# Patient Record
Sex: MALE | Race: BLACK OR AFRICAN AMERICAN | NOT HISPANIC OR LATINO | Employment: STUDENT | URBAN - METROPOLITAN AREA
[De-identification: names, ages, dates, MRNs, and addresses within clinical notes are randomized per-mention and may not be internally consistent; named-entity substitution may affect disease eponyms.]

---

## 2022-05-13 ENCOUNTER — OFFICE VISIT (OUTPATIENT)
Dept: ORTHOPEDICS | Facility: CLINIC | Age: 22
End: 2022-05-13
Payer: COMMERCIAL

## 2022-05-13 ENCOUNTER — HOSPITAL ENCOUNTER (OUTPATIENT)
Dept: RADIOLOGY | Facility: HOSPITAL | Age: 22
Discharge: HOME | End: 2022-05-13
Attending: PHYSICIAN ASSISTANT
Payer: COMMERCIAL

## 2022-05-13 VITALS
DIASTOLIC BLOOD PRESSURE: 69 MMHG | HEART RATE: 72 BPM | BODY MASS INDEX: 22.02 KG/M2 | WEIGHT: 177.13 LBS | SYSTOLIC BLOOD PRESSURE: 123 MMHG | HEIGHT: 75 IN

## 2022-05-13 DIAGNOSIS — M25.512 CHRONIC LEFT SHOULDER PAIN: ICD-10-CM

## 2022-05-13 DIAGNOSIS — G89.29 CHRONIC LEFT SHOULDER PAIN: ICD-10-CM

## 2022-05-13 DIAGNOSIS — S43.432A SUPERIOR GLENOID LABRUM LESION OF LEFT SHOULDER, INITIAL ENCOUNTER: Primary | ICD-10-CM

## 2022-05-13 PROCEDURE — 3074F PR MOST RECENT SYSTOLIC BLOOD PRESSURE < 130 MM HG: ICD-10-PCS | Mod: CPTII,S$GLB,, | Performed by: PHYSICIAN ASSISTANT

## 2022-05-13 PROCEDURE — 73030 X-RAY EXAM OF SHOULDER: CPT | Mod: TC,LT

## 2022-05-13 PROCEDURE — 3008F PR BODY MASS INDEX (BMI) DOCUMENTED: ICD-10-PCS | Mod: CPTII,S$GLB,, | Performed by: PHYSICIAN ASSISTANT

## 2022-05-13 PROCEDURE — 3078F DIAST BP <80 MM HG: CPT | Mod: CPTII,S$GLB,, | Performed by: PHYSICIAN ASSISTANT

## 2022-05-13 PROCEDURE — 3074F SYST BP LT 130 MM HG: CPT | Mod: CPTII,S$GLB,, | Performed by: PHYSICIAN ASSISTANT

## 2022-05-13 PROCEDURE — 73030 X-RAY EXAM OF SHOULDER: CPT | Mod: 26,LT,, | Performed by: RADIOLOGY

## 2022-05-13 PROCEDURE — 99499 NO LOS: ICD-10-PCS | Mod: S$GLB,,, | Performed by: PHYSICIAN ASSISTANT

## 2022-05-13 PROCEDURE — 3008F BODY MASS INDEX DOCD: CPT | Mod: CPTII,S$GLB,, | Performed by: PHYSICIAN ASSISTANT

## 2022-05-13 PROCEDURE — 99999 PR PBB SHADOW E&M-NEW PATIENT-LVL III: ICD-10-PCS | Mod: PBBFAC,,, | Performed by: PHYSICIAN ASSISTANT

## 2022-05-13 PROCEDURE — 99999 PR PBB SHADOW E&M-NEW PATIENT-LVL III: CPT | Mod: PBBFAC,,, | Performed by: PHYSICIAN ASSISTANT

## 2022-05-13 PROCEDURE — 1159F MED LIST DOCD IN RCRD: CPT | Mod: CPTII,S$GLB,, | Performed by: PHYSICIAN ASSISTANT

## 2022-05-13 PROCEDURE — 1159F PR MEDICATION LIST DOCUMENTED IN MEDICAL RECORD: ICD-10-PCS | Mod: CPTII,S$GLB,, | Performed by: PHYSICIAN ASSISTANT

## 2022-05-13 PROCEDURE — 3078F PR MOST RECENT DIASTOLIC BLOOD PRESSURE < 80 MM HG: ICD-10-PCS | Mod: CPTII,S$GLB,, | Performed by: PHYSICIAN ASSISTANT

## 2022-05-13 PROCEDURE — 73030 XR SHOULDER COMPLETE 2 OR MORE VIEWS LEFT: ICD-10-PCS | Mod: 26,LT,, | Performed by: RADIOLOGY

## 2022-05-13 PROCEDURE — 1160F RVW MEDS BY RX/DR IN RCRD: CPT | Mod: CPTII,S$GLB,, | Performed by: PHYSICIAN ASSISTANT

## 2022-05-13 PROCEDURE — 99499 UNLISTED E&M SERVICE: CPT | Mod: S$GLB,,, | Performed by: PHYSICIAN ASSISTANT

## 2022-05-13 PROCEDURE — 1160F PR REVIEW ALL MEDS BY PRESCRIBER/CLIN PHARMACIST DOCUMENTED: ICD-10-PCS | Mod: CPTII,S$GLB,, | Performed by: PHYSICIAN ASSISTANT

## 2022-05-13 RX ORDER — TRAMADOL HYDROCHLORIDE 50 MG/1
50 TABLET ORAL EVERY 6 HOURS PRN
Qty: 12 TABLET | Refills: 0 | Status: SHIPPED | OUTPATIENT
Start: 2022-05-13 | End: 2022-11-18

## 2022-05-13 NOTE — PROGRESS NOTES
"  SUBJECTIVE:     Chief Complaint & History of Present Illness:  Marco Mcmullen is a  New  patient 21 y.o. male who is seen here today with a complaint of  left shoulder pain .  Patient is here today for care treatment of sudden onset pain soreness in his left shoulder following a fall while playing basketball approximately 3 days ago.  Was seen at urgent care and sent for x-rays and MRI.  He has marked decrease in range of motion and significant pain with any activities greater than 20-30 degrees of abduction  On a scale of 1-10, with 10 being worst pain imaginable, he rates this pain as 6 on good days and 9 on bad days.  he describes the pain as tender and sore.    Review of patient's allergies indicates:  No Known Allergies      No current outpatient medications on file.     No current facility-administered medications for this visit.       No past medical history on file.    No past surgical history on file.    Vital Signs (Most Recent)  Vitals:    05/13/22 1457   BP: 123/69   Pulse: 72       Review of Systems:  ROS:  Constitutional: no fever or chills  Eyes: no visual changes  ENT: no nasal congestion or sore throat  Respiratory: no cough or shortness of breath  Cardiovascular: no chest pain or palpitations  Gastrointestinal: no nausea or vomiting, tolerating diet  Genitourinary: no hematuria or dysuria  Integument/Breast: no rash or pruritis  Hematologic/Lymphatic: no easy bruising or lymphadenopathy  Musculoskeletal: no arthralgias or myalgias  Neurological: no seizures or tremors  Behavioral/Psych: no auditory or visual hallucinations  Endocrine: no heat or cold intolerance      OBJECTIVE:     PHYSICAL EXAM:  Height: 6' 3" (190.5 cm) Weight: 80.3 kg (177 lb 2.2 oz), General Appearance: Well nourished, well developed, in no acute distress.  Neurological: Mood & affect are normal.  Shoulder exam: left  Tenderness: globally  ROM: not tested  Shoulder Strength: not tested  }     "                 RADIOGRAPHS:  X-rays of the shoulder taken today films reviewed by me demonstrate no evidence of fracture or dislocation joint spaces well maintained no advanced degenerative joint disease.    Patient brings MRI report from outside facility demonstrates moderate to severe slap tear of the left shoulder    ASSESSMENT/PLAN:       ICD-10-CM ICD-9-CM   1. Chronic left shoulder pain  M25.512 719.41    G89.29 338.29       Plan:  Patient will keep consult to Sports Medicine for Monday morning with MRI  Prescription for tramadol 50 mg Q 6 hours for pain control continue NSAIDs ice and rest immobilization  This will be a no-charge visit

## 2022-05-16 ENCOUNTER — OFFICE VISIT (OUTPATIENT)
Dept: SPORTS MEDICINE | Facility: CLINIC | Age: 22
End: 2022-05-16
Payer: COMMERCIAL

## 2022-05-16 DIAGNOSIS — S43.432A GLENOID LABRUM TEAR, LEFT, INITIAL ENCOUNTER: Primary | ICD-10-CM

## 2022-05-16 PROCEDURE — 1159F MED LIST DOCD IN RCRD: CPT | Mod: CPTII,S$GLB,, | Performed by: ORTHOPAEDIC SURGERY

## 2022-05-16 PROCEDURE — 99999 PR PBB SHADOW E&M-EST. PATIENT-LVL II: CPT | Mod: PBBFAC,,, | Performed by: ORTHOPAEDIC SURGERY

## 2022-05-16 PROCEDURE — 99213 PR OFFICE/OUTPT VISIT, EST, LEVL III, 20-29 MIN: ICD-10-PCS | Mod: S$GLB,,, | Performed by: ORTHOPAEDIC SURGERY

## 2022-05-16 PROCEDURE — 1159F PR MEDICATION LIST DOCUMENTED IN MEDICAL RECORD: ICD-10-PCS | Mod: CPTII,S$GLB,, | Performed by: ORTHOPAEDIC SURGERY

## 2022-05-16 PROCEDURE — 99213 OFFICE O/P EST LOW 20 MIN: CPT | Mod: S$GLB,,, | Performed by: ORTHOPAEDIC SURGERY

## 2022-05-16 PROCEDURE — 99999 PR PBB SHADOW E&M-EST. PATIENT-LVL II: ICD-10-PCS | Mod: PBBFAC,,, | Performed by: ORTHOPAEDIC SURGERY

## 2022-05-16 NOTE — PROGRESS NOTES
NEW PATIENT    HISTORY OF PRESENT ILLNESS   21 y.o. Male with a history of left shulder pain who is a student at [employer]. He states he fell while playing basketball. This was about a week ago. He states he is feeling popping in the shoulder while he is pulling and while putting his shirt on. He has pain while lifting overhead.  He does not recall how he fell. He is referred today by Fercho Abreu PA-C.  He was sent for an outside MRI which he brings with him today.  He states he still in pain and has been wearing a sling.    Is affecting ADLs.  Pain is /10 at it's worst.        PAST MEDICAL HISTORY    No past medical history on file.    PAST SURGICAL HISTORY     No past surgical history on file.    FAMILY HISTORY    No family history on file.    SOCIAL HISTORY    Social History     Socioeconomic History    Marital status: Single       MEDICATIONS      Current Outpatient Medications:     traMADoL (ULTRAM) 50 mg tablet, Take 1 tablet (50 mg total) by mouth every 6 (six) hours as needed for Pain., Disp: 12 tablet, Rfl: 0    ALLERGIES     Review of patient's allergies indicates:  No Known Allergies      REVIEW OF SYSTEMS   Constitution: Negative. Negative for chills, fever and night sweats.   HENT: Negative for congestion and headaches.    Eyes: Negative for blurred vision, left vision loss and right vision loss.   Cardiovascular: Negative for chest pain and syncope.   Respiratory: Negative for cough and shortness of breath.    Endocrine: Negative for polydipsia, polyphagia and polyuria.   Hematologic/Lymphatic: Negative for bleeding problem. Does not bruise/bleed easily.   Skin: Negative for dry skin, itching and rash.   Musculoskeletal: Negative for falls. Positive for left shoulder pain  Gastrointestinal: Negative for abdominal pain and bowel incontinence.   Genitourinary: Negative for bladder incontinence and nocturia.   Neurological: Negative for disturbances in coordination, loss of balance and seizures.    Psychiatric/Behavioral: Negative for depression. The patient does not have insomnia.    Allergic/Immunologic: Negative for hives and persistent infections.     PHYSICAL EXAMINATION    Vitals: There were no vitals taken for this visit.    General: The patient appears active and healthy with no apparent physical problems.  No disturbance of mood or affect is demonstrated. Alert and Oriented.    HEENT: Eyes normal, pupils equally round, nose normal.    Resp: Equal and symmetrical chest rises. No wheezing    CV: Regular rate    Neck: Supple; nonpainful range of motion.    Extremities: no cyanosis, clubbing, edema, or diffuse swelling.  Palpable pulses, good capillary refill of the digits.  No coolness, discoloration, edema or obvious varicosities.    Skin: no lesions noted.    Lymphatic: no detected adenopathy in the upper or lower extremities.    Neurologic: normal mental status, normal reflexes, normal gait and balance.  Patient is alert and oriented to person, place and time.  No flaccidity or spasticity is noted.  No motor or sensory deficits are noted.  Light touch is intact    Orthopaedic:     SHOULDER EXAM - LEFT    Inspection:   Normal skin color and appearance with no scars.  No muscle atrophy noted.  No scapular winging.      Palpation: No tenderness of the acromioclavicular joint, lateral edge of the acromion, biceps tendon, trapezius muscle or scapulothoracic bursa.      ROM:      PROM:     FE - 180°    Abd/ER -  90°  Abd/IR -  45°   Add/ER -  60°     AROM:    FE - 180°    Abd/ER -  90°  Abd/IR -  45°   Add/ER -  60°         Tests:     - Ferguson, - Neer's, - Cross Arm Adduction, + Sherman, - Yerguson, - Speed. - Belly Press,  - Jobes, - Lift Off    Stability: - sulcus, - apprehension, - relocation, - load and shift, + DLS      Motor:  Rotator cuff strength is 5/5 supraspinatus, 5/5 infraspinatus, 5/5 subscapularis. Biceps, triceps and deltoid strength is 5/5.      Neuro     Distally there are no  paresthesias, and sensation is intact to light touch in the median, ulnar, and radial distributions.  Reflexes are 2/2 biceps, triceps and brachioradialis.        IMAGING    X-Ray Shoulder 2 or More Views Left  Narrative: EXAMINATION:  XR SHOULDER COMPLETE 2 OR MORE VIEWS LEFT    CLINICAL HISTORY:  lt. shoulder pain;Pain in left shoulder    TECHNIQUE:  Internal rotation, 2 scapula Y-views, and axillary view acquired.    COMPARISON:  None    FINDINGS:  No fracture.  No malalignment.  Preserved glenohumeral and acromioclavicular articulations.  No findings calcific tendonitis.  Impression: No acute or significant bony findings.    Electronically signed by: Bishop Rangel  Date:    05/13/2022  Time:    15:54    I reviewed an outside MRI of his left shoulder which demonstrates a posterior superior labral tear.  No other significant findings.    IMPRESSION       ICD-10-CM ICD-9-CM   1. Glenoid labrum tear, left, initial encounter  S43.432A 840.8       MEDICATIONS PRESCRIBED      1. None    RECOMMENDATIONS     1. Referral to physical therapy placed today  2. RTC in 1 month  3. I advised him that we will try and treat this nonsurgical at this point.  We will re-evaluate in 1 month.  If his symptoms do persist, we did discuss a left shoulder arthroscopic SLAP repair as well as posterior labral repair      All questions were answered, pt will contact us for questions or concerns in the interim.

## 2022-05-18 ENCOUNTER — CLINICAL SUPPORT (OUTPATIENT)
Dept: REHABILITATION | Facility: HOSPITAL | Age: 22
End: 2022-05-18
Attending: ORTHOPAEDIC SURGERY
Payer: COMMERCIAL

## 2022-05-18 DIAGNOSIS — M25.512 ACUTE PAIN OF LEFT SHOULDER DUE TO TRAUMA: ICD-10-CM

## 2022-05-18 DIAGNOSIS — G25.89 SCAPULAR DYSKINESIS: ICD-10-CM

## 2022-05-18 DIAGNOSIS — S43.432A GLENOID LABRUM TEAR, LEFT, INITIAL ENCOUNTER: ICD-10-CM

## 2022-05-18 DIAGNOSIS — G89.11 ACUTE PAIN OF LEFT SHOULDER DUE TO TRAUMA: ICD-10-CM

## 2022-05-18 DIAGNOSIS — M25.612 DECREASED RANGE OF MOTION OF LEFT SHOULDER: ICD-10-CM

## 2022-05-18 PROCEDURE — 97161 PT EVAL LOW COMPLEX 20 MIN: CPT

## 2022-05-18 PROCEDURE — 97110 THERAPEUTIC EXERCISES: CPT

## 2022-05-19 PROBLEM — G89.11 ACUTE PAIN OF LEFT SHOULDER DUE TO TRAUMA: Status: ACTIVE | Noted: 2022-05-19

## 2022-05-19 PROBLEM — G25.89 SCAPULAR DYSKINESIS: Status: ACTIVE | Noted: 2022-05-19

## 2022-05-19 PROBLEM — M25.612 DECREASED RANGE OF MOTION OF LEFT SHOULDER: Status: ACTIVE | Noted: 2022-05-19

## 2022-05-19 PROBLEM — M25.512 ACUTE PAIN OF LEFT SHOULDER DUE TO TRAUMA: Status: ACTIVE | Noted: 2022-05-19

## 2022-05-19 PROBLEM — S43.432A GLENOID LABRUM TEAR, LEFT, INITIAL ENCOUNTER: Status: ACTIVE | Noted: 2022-05-19

## 2022-05-19 NOTE — PLAN OF CARE
[facility]  Physical Therapy Initial Evaluation    [id number]    Therapy Diagnosis:   Encounter Diagnoses   Name Primary?    Glenoid labrum tear, left, initial encounter     Decreased range of motion of left shoulder     Scapular dyskinesis     Acute pain of left shoulder due to trauma      Physician: Jaycee Mcguire MD    Physician Orders: PT Eval and Treat  Medical Diagnosis from Referral: S43.432A (ICD-10-CM) - Glenoid labrum tear, left, initial encounter  Evaluation Date: 5/18/2022  Authorization Period Expiration: 5/16/2023  Plan of Care Expiration: 12/31/2022  Visit # / Visits authorized: 1/ 1    Time In: 1515  Time Out: 1610  Total Billable Time: 50 minutes    Precautions: Standard    Subjective     Date of onset: 5/4/2022  History of current condition - Marco reports: He was playing basketball when he fell onto his L shoulder. He does not recall exactly how he fell or if he FOOSH or not. He had immediate pain which increased lateral that evening. He started noting particular difficulty with putting his shirt on and performing overhead tasks. He reports occasional popping/clicking which is sometimes painful. He denies and N&T. He was seen by an orthopedic physician and then referred to sports med before being referred here. Radiographs were unremarkable. MRI brought from another facility shows a low grade SLAP tear from 12 to 6. Pt feels like he is slowly improving and is mostly trying to keep his arm below shoulder height and closer to his body.      Imaging   XR SHOULDER COMPLETE 2 OR MORE VIEWS LEFT     CLINICAL HISTORY:  lt. shoulder pain;Pain in left shoulder     TECHNIQUE:  Internal rotation, 2 scapula Y-views, and axillary view acquired.     COMPARISON:  None     FINDINGS:  No fracture.  No malalignment.  Preserved glenohumeral and acromioclavicular articulations.  No findings calcific tendonitis.     Impression:     No acute or  significant bony findings.    Prior Therapy: None  Exercise Routine/Sport Participation: Plays pickup basketball recreationally.   Social History: Lives at home  Occupation: [employer] student  Prior Level of Function: Unrestricted  Current Level of Function: Pain and difficulty with most functional tasks involving UE.     Pain:  Current 1/10, worst 8/10, best 0/10   Location: left shoulder  Description: Aching, Dull, Grabbing, Tight and Sharp  Aggravating Factors: Flexing and taking his shirt on/off  Easing Factors: Avoiding painful movements    Pts goals: Alleviate pain and restore full function of his shoulder.       Medical History:   No past medical history on file.    Surgical History:   Marco Mcmullen  has no past surgical history on file.    Medications:   Marco has a current medication list which includes the following prescription(s): tramadol.    Allergies:   Review of patient's allergies indicates:  No Known Allergies     Objective     Posture: L Shoulder elevated, L scapula elevated and anteriorly tilted, bilateral inferior angle prominence, increased anterior translation and prominence at C7. Flattened thoracic kyphosis      Shoulder Elevation: Decreased L upward scapular rotation with aberrant setting phase. Decreased L compared to R.       Shoulder Active Range of Motion:   Shoulder Right Left   Flexion   180 90 P!   ER at 0   90 90   Behind the back Reach   T10 T12 P!   Scapula Upward Rotation 60 45      Shoulder Passive Range of Motion:   Shoulder Right Left   Flexion   180 180   ER at 0   90 90   ER at 90   110 95 P!   IR   75 75     Cervical Range of Motion: WNL. Increased Motion at C5-C6 and upper cervical spine    Seated Thoracolumbar Range of Motion:    % Observation Pain   Flexion 100 Unremarkable N   Extension 70 Noted restriction T6-T2 N   Right Rotation 100 WNL N   Left Rotation 80 Restricted N       Strength:   Right Left   Scaption 4+/5 4/5   Shoulder ER at side 5/5 5/5   Shoulder IR at  side  5/5 5/5   Deltoid 5/5 5/5   Scapula Upward rotation force couple- UT/LT/SA (determined due to inability to reach full ROM) 4+/5 3+/5         Special Tests:   Right Left   Ferguson- Jose - -   Neer's - -   Full Can - -   Posterior/Internal Impingement Sign - -   Supine Impingement  - -      Right Left   Load & Shift - +2   Sulcus Sign - +   Apprehension Test - +   Relocation Test  - +   Clunk Test - +      Right Left   Drop Arm Test - -   ER Lag Sign - -   Bear Hug - -   Belly Press  - -      Right Left   Active Compression - +   Cross Body Adduction - +   Scapular Assist - +       Joint Mobility: Increased joint mobility bilaterally at GH joint with increased L anterior humeral translation at 45 and 90 degrees abduction on L. P! With posterior glide on L when assessing GH ligs.     Palpation: Mild TTP about the posterior glenoid    Flexibility:   Post Cuff: R + ; L +   Pec Minor: R + ; L +    Beighton Index: 5/9      CMS Impairment/Limitation/Restriction for FOTO Shoulder Survey    Therapist reviewed FOTO scores for Marco GouldSavageAvel on 5/18/2022.   FOTO documents entered into [employer] - see Media section.    Limitation Score: See media section  Category: Other       Treatment     Treatment Time In: 1545  Treatment Time Out: 1610  Total Treatment time separate from Evaluation: 25 minutes    Marco received therapeutic exercises to develop strength, endurance, ROM, flexibility and posture for 20 minutes including:  S/L Thoracic Rotation x10 ea.   Quadruped SA Rock Back x10  IR/ER TB Step outs 3x8 ea.     Marco received the following manual therapy techniques:  were applied for 5 minutes, including:  Supine thoracic HVLA      Home Exercises and Patient Education Provided     Education provided:   - Principles of proximal stability for distal control.   - Prognosis, activity modification, goals for therapy, role of therapy for care, exercises/HEP    Written Home Exercises Provided: See patient  "instructions.  Exercises were reviewed and Mraco was able to demonstrate them prior to the end of the session.   Pt received a written copy of exercises to perform at home. Marco demonstrated good  understanding of the education provided.     See EMR under patient instructions for exercises given.     Assessment     Marco is a 21 y.o. male referred to outpatient Physical Therapy with a medical diagnosis of "glenoid labrum tear". He presents with L shoulder pain, decreased ROM, scapular dyskinesis, and decreased motor control with signs and symptoms today consistent with labral pathology.       Pt will benefit from skilled outpatient Physical Therapy to address the deficits stated above and in the chart below, provide pt/family education, and to maximize pt's level of independence. Pt prognosis is Excellent.     Plan of care discussed with patient: Yes  Pt's spiritual, cultural and educational needs considered and patient is agreeable to the plan of care and goals as stated below:       Anticipated Barriers for therapy: COVID19      Medical Necessity is demonstrated by the following  History  Co-morbidities and personal factors that may impact the plan of care Co-morbidities:   None    Personal Factors:   no deficits     low   Examination  Body Structures and Functions, activity limitations and participation restrictions that may impact the plan of care Body Regions:   neck  back  upper extremities  trunk    Body Systems:    gross symmetry  ROM  strength  gross coordinated movement  motor control  motor learning    Participation Restrictions:   None    Activity limitations:   Learning and applying knowledge  No deficit    General Tasks and Commands  No deficit    Communication  No deficit    Mobility  lifting and carrying objects  moving around using equipment (WC)    Self care  No deficit    Domestic Life  No deficit    Interactions/Relationships  No deficit    Life Areas  Recreational health/wellness related " acitivites.     Community and Social Life  No deficit          moderate   Clinical Presentation stable and uncomplicated low   Decision Making/ Complexity Score: low     Goals:  Short Term Goals: 4 weeks  1. Pt will be compliant with HEP 50% of prescribed amount.   2. The pt to demo improvement in FOTO score to meet or exceed MCID.   3.  Pt will report NPRS <2/10 with dressing.   4. Pt will demo full AROM compared to contralateral side    Long Term Goals: 24 weeks   1. Pt will be compliant with % of prescribed amount.   2. Pt FOTO score to meet or exceed predicted outcome.   3. Pt will demo full upward scapular rotation with AROM UE elevation   4. The pt will report full participation in ADLs and IADLs without restrictions related to L [place].     Plan   Plan of care Certification: 5/18/2022 to 12/31/2022.    Outpatient Physical Therapy 2 times weekly for 24 weeks to include the following interventions: Manual Therapy, Moist Heat/ Ice, Neuromuscular Re-ed, Patient Education, Self Care, Therapeutic Activities and Therapeutic Exercise.     Primitivo Greco, PT , DPT

## 2022-05-25 ENCOUNTER — CLINICAL SUPPORT (OUTPATIENT)
Dept: REHABILITATION | Facility: HOSPITAL | Age: 22
End: 2022-05-25
Payer: COMMERCIAL

## 2022-05-25 DIAGNOSIS — G25.89 SCAPULAR DYSKINESIS: ICD-10-CM

## 2022-05-25 DIAGNOSIS — M25.612 DECREASED RANGE OF MOTION OF LEFT SHOULDER: Primary | ICD-10-CM

## 2022-05-25 DIAGNOSIS — G89.11 ACUTE PAIN OF LEFT SHOULDER DUE TO TRAUMA: ICD-10-CM

## 2022-05-25 DIAGNOSIS — M25.512 ACUTE PAIN OF LEFT SHOULDER DUE TO TRAUMA: ICD-10-CM

## 2022-05-25 PROCEDURE — 97140 MANUAL THERAPY 1/> REGIONS: CPT

## 2022-05-25 PROCEDURE — 97110 THERAPEUTIC EXERCISES: CPT

## 2022-05-25 PROCEDURE — 97112 NEUROMUSCULAR REEDUCATION: CPT

## 2022-05-25 NOTE — PROGRESS NOTES
Physical Therapy Daily Treatment Note     [id number]    Therapy Diagnosis:   Encounter Diagnoses   Name Primary?    Decreased range of motion of left shoulder Yes    Scapular dyskinesis     Acute pain of left shoulder due to trauma      Physician: Jaycee Mcguire MD    Visit Date: 5/25/2022  Physician Orders: PT Eval and Treat  Medical Diagnosis from Referral: S43.432A (ICD-10-CM) - Glenoid labrum tear, left, initial encounter  Evaluation Date: 5/18/2022  Authorization Period Expiration: 5/16/2023  Plan of Care Expiration: 12/31/2022  Visit # / Visits authorized: 1/ 20 + eval    Time In: 1703  Time Out: 1805  Total Billable Time: 55 minutes    Precautions: Standard    Subjective     Pt reports: His shoulder is feeling much better. He notes he is now able to change shirts with decreased pain and difficulty.    He was compliant with home exercise program.      Pain: Not verbalized/10  Location: L shoulder     Objective     Daily Measurements:     Shoulder Active Range of Motion:   Shoulder Right Left   Flexion    180 140           Daily Treatment       Marco received therapeutic exercises to develop strength, endurance, ROM, flexibility and posture for 29 minutes including:  Rower Lvl 5 x500m  S/L Thoracic Rotation x10 ea.   Quadruped SA Rock Back 2x10  IR/ER TB Step outs 3x8 ea.     Marco received the following manual therapy techniques: were applied for 10 minutes, including:  Re-assessment  S/L Scapular PNF w/PT  Supine thoracic HVLA       Marco participated in neuromuscular re-education activities to improve: Coordination, Kinesthetic, Sense and Motor Control for 16 minutes. The following activities were included:  90/90 IR/ER Step out in sagittal plane PTB 3x8 ea.   SA Wall slides 2x10     Next visit:  SA Wall Ball ABC's at Flx, Scap, and Abd 3x ea to light burn        Home Exercises and Patient Education Provided     Education provided:   - Reviewed/updated  HEP    Written Home Exercises Provided: yes.  Exercises were reviewed and Marco was able to demonstrate them prior to the end of the session.  Marco demonstrated good  understanding of the education provided.     See EMR under patient instructions for exercises given.     Assessment     Marco presents today with improved AROM and decreased pain indicating positive response to stabilization interventions and periscapular re-education. AROM flexion further improved within session to 160 degrees. He needed occasional cues to avoid UT/elvator dominant activation with step outs.     Marco Is progressing well towards his goals.     Pt will continue to benefit from skilled outpatient physical therapy to address the deficits listed in the problem list box on initial evaluation, provide pt/family education and to maximize pt's level of independence in the home and community environment. Pt prognosis is Good.     Pt's spiritual, cultural and educational needs considered and pt agreeable to plan of care and goals.    Anticipated barriers to physical therapy: None    Goals:  Short Term Goals: 4 weeks  1. Pt will be compliant with HEP 50% of prescribed amount.   2. The pt to demo improvement in FOTO score to meet or exceed MCID.   3.  Pt will report NPRS <2/10 with dressing.   4. Pt will demo full AROM compared to contralateral side     Long Term Goals: 24 weeks   1. Pt will be compliant with % of prescribed amount.   2. Pt FOTO score to meet or exceed predicted outcome.   3. Pt will demo full upward scapular rotation with AROM UE elevation   4. The pt will report full participation in ADLs and IADLs without restrictions related to L [place].       Plan     Plan of care Certification: 5/18/2022 to 12/31/2022.     Outpatient Physical Therapy 2 times weekly for 24 weeks to include the following interventions: Manual Therapy, Moist Heat/ Ice, Neuromuscular Re-ed, Patient Education, Self Care, Therapeutic Activities and  Therapeutic Exercise.        Primitivo Greco, PT , DPT

## 2022-06-08 ENCOUNTER — CLINICAL SUPPORT (OUTPATIENT)
Dept: REHABILITATION | Facility: HOSPITAL | Age: 22
End: 2022-06-08
Payer: COMMERCIAL

## 2022-06-08 DIAGNOSIS — M25.512 ACUTE PAIN OF LEFT SHOULDER DUE TO TRAUMA: ICD-10-CM

## 2022-06-08 DIAGNOSIS — G25.89 SCAPULAR DYSKINESIS: ICD-10-CM

## 2022-06-08 DIAGNOSIS — M25.612 DECREASED RANGE OF MOTION OF LEFT SHOULDER: Primary | ICD-10-CM

## 2022-06-08 DIAGNOSIS — G89.11 ACUTE PAIN OF LEFT SHOULDER DUE TO TRAUMA: ICD-10-CM

## 2022-06-08 PROCEDURE — 97112 NEUROMUSCULAR REEDUCATION: CPT

## 2022-06-08 PROCEDURE — 97110 THERAPEUTIC EXERCISES: CPT

## 2022-06-08 PROCEDURE — 97140 MANUAL THERAPY 1/> REGIONS: CPT

## 2022-06-08 NOTE — PROGRESS NOTES
Physical Therapy Daily Treatment Note     [id number]    Therapy Diagnosis:   Encounter Diagnoses   Name Primary?    Decreased range of motion of left shoulder Yes    Scapular dyskinesis     Acute pain of left shoulder due to trauma      Physician: Jaycee Mcguire MD    Visit Date: 6/8/2022  Physician Orders: PT Eval and Treat  Medical Diagnosis from Referral: S43.432A (ICD-10-CM) - Glenoid labrum tear, left, initial encounter  Evaluation Date: 5/18/2022  Authorization Period Expiration: 5/16/2023  Plan of Care Expiration: 12/31/2022  Visit # / Visits authorized: 2 / 20 + eval  1st FOTO F/U: 65%    Time In: 1635 (Pt arrived late)  Time Out: 1729  Total Billable Time: 54 minutes    Precautions: Standard    Subjective     Pt reports: His shoulder is feeling great. He was able to play and dunk a basketball pain free. He did try some swimming and didn't have pain but felt like his shoulder was a little tight.     He was compliant with home exercise program.      Pain: Not verbalized/10  Location: L shoulder     Objective     Daily Measurements:     Shoulder Active Range of Motion:   Shoulder Right Left   Flexion    180 180 Mild P! At EROM           Daily Treatment       Marco received therapeutic exercises to develop strength, endurance, ROM, flexibility and posture for 15 minutes including:  Rower Lvl 10 x500m  Cable Row Eccentric periscapular control 13# 3x10       Marco received the following manual therapy techniques: were applied for 10 minutes, including:  Re-assessment  Supine thoracic HVLA       Marco participated in neuromuscular re-education activities to improve: Coordination, Kinesthetic, Sense and Motor Control for 29 minutes. The following activities were included:  SA Wall Ball Abc's in sagittal, scapular, and frontal plane 2x ea. to light burn  90/90 IR/ER Step out in frontal plane PTB 3x8 ea.   SA Wall slides w/LT lift 3x10       Upcoming visits.   Reverse  Bear Crawls    Home Exercises and Patient Education Provided     Education provided:   - Reviewed/updated HEP    Written Home Exercises Provided: yes.  Exercises were reviewed and Marco was able to demonstrate them prior to the end of the session.  Marco demonstrated good  understanding of the education provided.     See EMR under patient instructions for exercises given.     Assessment     Pt continues to present at onset of treatment with significantly improved AROM in all planes with decreased pain. Improved SA strength and motor control with assessment today. Pt demo's weakness and decreased motor control with full AROM noted following activation/re-education interventions. He continues to respond well to CKC loading to improve joint centraction and proprioceptive input. Pt FOTO score exceeds MCID indicating a clinically meaningful response.     Marco Is progressing well towards his goals.     Pt will continue to benefit from skilled outpatient physical therapy to address the deficits listed in the problem list box on initial evaluation, provide pt/family education and to maximize pt's level of independence in the home and community environment. Pt prognosis is Good.     Pt's spiritual, cultural and educational needs considered and pt agreeable to plan of care and goals.    Anticipated barriers to physical therapy: None    Goals:  Short Term Goals: 4 weeks  1. Pt will be compliant with HEP 50% of prescribed amount.   2. The pt to demo improvement in FOTO score to meet or exceed MCID.   3.  Pt will report NPRS <2/10 with dressing.   4. Pt will demo full AROM compared to contralateral side     Long Term Goals: 24 weeks   1. Pt will be compliant with % of prescribed amount.   2. Pt FOTO score to meet or exceed predicted outcome.   3. Pt will demo full upward scapular rotation with AROM UE elevation   4. The pt will report full participation in ADLs and IADLs without restrictions related to [place].        Plan     Plan of care Certification: 5/18/2022 to 12/31/2022.     Outpatient Physical Therapy 2 times weekly for 24 weeks to include the following interventions: Manual Therapy, Moist Heat/ Ice, Neuromuscular Re-ed, Patient Education, Self Care, Therapeutic Activities and Therapeutic Exercise.        Primitivo Greco, PT , DPT

## 2022-06-15 ENCOUNTER — CLINICAL SUPPORT (OUTPATIENT)
Dept: REHABILITATION | Facility: HOSPITAL | Age: 22
End: 2022-06-15
Payer: COMMERCIAL

## 2022-06-15 DIAGNOSIS — G25.89 SCAPULAR DYSKINESIS: ICD-10-CM

## 2022-06-15 DIAGNOSIS — M25.512 ACUTE PAIN OF LEFT SHOULDER DUE TO TRAUMA: ICD-10-CM

## 2022-06-15 DIAGNOSIS — G89.11 ACUTE PAIN OF LEFT SHOULDER DUE TO TRAUMA: ICD-10-CM

## 2022-06-15 DIAGNOSIS — M25.612 DECREASED RANGE OF MOTION OF LEFT SHOULDER: Primary | ICD-10-CM

## 2022-06-15 PROCEDURE — 97140 MANUAL THERAPY 1/> REGIONS: CPT

## 2022-06-15 PROCEDURE — 97112 NEUROMUSCULAR REEDUCATION: CPT

## 2022-06-15 NOTE — PROGRESS NOTES
Physical Therapy Daily Treatment Note     [facility] [id number]    Therapy Diagnosis:   Encounter Diagnoses   Name Primary?    Decreased range of motion of left shoulder Yes    Scapular dyskinesis     Acute pain of left shoulder due to trauma      Physician: Jaycee Mcguire MD    Visit Date: 6/15/2022  Physician Orders: PT Eval and Treat  Medical Diagnosis from Referral: S43.432A (ICD-10-CM) - Glenoid labrum tear, left, initial encounter  Evaluation Date: 5/18/2022  Authorization Period Expiration: 5/16/2023  Plan of Care Expiration: 12/31/2022  Visit # / Visits authorized: 3 / 20 + eval  1st FOTO F/U: 65%    Time In: 1605  Time Out: 1709  Total Billable Time: 56 minutes    Precautions: Standard    Subjective     Pt reports: His shoulder had been feeling great. He fell off a skateboard the other day onto his right shoulder. He had some mild shoulder pain and non-painful pops but does not recall a subluxation or dislocation.     He was compliant with home exercise program.      Pain: Not verbalized/10  Location: L shoulder     Objective     Daily Measurements:     Shoulder Active Range of Motion:   Shoulder Right Left   Flexion    180 180 Mild P! At EROM           Daily Treatment       Marco received therapeutic exercises to develop strength, endurance, ROM, flexibility and posture for 5 minutes including:  Seated thoracic extension over chair x10      Marco received the following manual therapy techniques: were applied for 10 minutes, including:  Re-assessment  Supine thoracic HVLA       Marco participated in neuromuscular re-education activities to improve: Coordination, Kinesthetic, Sense and Motor Control for 41 minutes. The following activities were included:  Quadruped SA Rock w/LT Lift off 2x10   SA Wall Ball Abc's in sagittal, scapular, and frontal plane above shoulder height 2x ea. to light burn  90/90 IR/ER Step out in frontal plane PTB 3x8 ea.   Body Blade series: 2x to  "light burn  - IR/ER at side  - Fwd/Back at side  - at 90 degrees flexion  - at 90 degrees abduction    Prone Lvl 1 LT Re-Ed 10x10"  Prone Lvl 1 MT Re-Ed 10x10"      Upcoming visits.   Reverse Bear Crawls    Home Exercises and Patient Education Provided     Education provided:   - Reviewed/updated HEP    Written Home Exercises Provided: yes.  Exercises were reviewed and Marco was able to demonstrate them prior to the end of the session.  Marco demonstrated good  understanding of the education provided.     See EMR under patient instructions for exercises given.     Assessment     Pt presents today with reports of slight anterior shoulder tightness with EROM UE elevation and full AROM. This resolved following scapular re-education and GH stabilization interventions. Progressed CKC stabilization and motor control above shoulder height today which was tolerated very well. Pt demo's significant difficulty with isolated LT and MT motor control/activation. Intro'd muscle activation/re-education which was appropriately challenging.     Marco Is progressing well towards his goals.     Pt will continue to benefit from skilled outpatient physical therapy to address the deficits listed in the problem list box on initial evaluation, provide pt/family education and to maximize pt's level of independence in the home and community environment. Pt prognosis is Good.     Pt's spiritual, cultural and educational needs considered and pt agreeable to plan of care and goals.    Anticipated barriers to physical therapy: None    Goals:  Short Term Goals: 4 weeks  1. Pt will be compliant with HEP 50% of prescribed amount.   2. The pt to demo improvement in FOTO score to meet or exceed MCID.   3.  Pt will report NPRS <2/10 with dressing.   4. Pt will demo full AROM compared to contralateral side     Long Term Goals: 24 weeks   1. Pt will be compliant with % of prescribed amount.   2. Pt FOTO score to meet or exceed predicted outcome. "   3. Pt will demo full upward scapular rotation with AROM UE elevation   4. The pt will report full participation in ADLs and IADLs without restrictions related to [place].       Plan     Plan of care Certification: 5/18/2022 to 12/31/2022.     Outpatient Physical Therapy 2 times weekly for 24 weeks to include the following interventions: Manual Therapy, Moist Heat/ Ice, Neuromuscular Re-ed, Patient Education, Self Care, Therapeutic Activities and Therapeutic Exercise.        Primitivo Greco, PT , DPT

## 2022-06-20 ENCOUNTER — OFFICE VISIT (OUTPATIENT)
Dept: SPORTS MEDICINE | Facility: CLINIC | Age: 22
End: 2022-06-20
Payer: COMMERCIAL

## 2022-06-20 VITALS
SYSTOLIC BLOOD PRESSURE: 117 MMHG | DIASTOLIC BLOOD PRESSURE: 64 MMHG | HEIGHT: 75 IN | WEIGHT: 173 LBS | BODY MASS INDEX: 21.51 KG/M2 | HEART RATE: 68 BPM

## 2022-06-20 DIAGNOSIS — S43.432A GLENOID LABRUM TEAR, LEFT, INITIAL ENCOUNTER: Primary | ICD-10-CM

## 2022-06-20 PROCEDURE — 3074F SYST BP LT 130 MM HG: CPT | Mod: CPTII,S$GLB,, | Performed by: ORTHOPAEDIC SURGERY

## 2022-06-20 PROCEDURE — 3008F BODY MASS INDEX DOCD: CPT | Mod: CPTII,S$GLB,, | Performed by: ORTHOPAEDIC SURGERY

## 2022-06-20 PROCEDURE — 99214 PR OFFICE/OUTPT VISIT, EST, LEVL IV, 30-39 MIN: ICD-10-PCS | Mod: S$GLB,,, | Performed by: ORTHOPAEDIC SURGERY

## 2022-06-20 PROCEDURE — 99999 PR PBB SHADOW E&M-EST. PATIENT-LVL III: ICD-10-PCS | Mod: PBBFAC,,, | Performed by: ORTHOPAEDIC SURGERY

## 2022-06-20 PROCEDURE — 99999 PR PBB SHADOW E&M-EST. PATIENT-LVL III: CPT | Mod: PBBFAC,,, | Performed by: ORTHOPAEDIC SURGERY

## 2022-06-20 PROCEDURE — 1159F PR MEDICATION LIST DOCUMENTED IN MEDICAL RECORD: ICD-10-PCS | Mod: CPTII,S$GLB,, | Performed by: ORTHOPAEDIC SURGERY

## 2022-06-20 PROCEDURE — 99214 OFFICE O/P EST MOD 30 MIN: CPT | Mod: S$GLB,,, | Performed by: ORTHOPAEDIC SURGERY

## 2022-06-20 PROCEDURE — 3078F PR MOST RECENT DIASTOLIC BLOOD PRESSURE < 80 MM HG: ICD-10-PCS | Mod: CPTII,S$GLB,, | Performed by: ORTHOPAEDIC SURGERY

## 2022-06-20 PROCEDURE — 1159F MED LIST DOCD IN RCRD: CPT | Mod: CPTII,S$GLB,, | Performed by: ORTHOPAEDIC SURGERY

## 2022-06-20 PROCEDURE — 3078F DIAST BP <80 MM HG: CPT | Mod: CPTII,S$GLB,, | Performed by: ORTHOPAEDIC SURGERY

## 2022-06-20 PROCEDURE — 3008F PR BODY MASS INDEX (BMI) DOCUMENTED: ICD-10-PCS | Mod: CPTII,S$GLB,, | Performed by: ORTHOPAEDIC SURGERY

## 2022-06-20 PROCEDURE — 3074F PR MOST RECENT SYSTOLIC BLOOD PRESSURE < 130 MM HG: ICD-10-PCS | Mod: CPTII,S$GLB,, | Performed by: ORTHOPAEDIC SURGERY

## 2022-06-20 NOTE — PROGRESS NOTES
"Established  PATIENT    History 6/20/222  Pt fell skateboarding with friends since last encounter. Pt feels continous  popping in left shoulder. Pt would like to schedule surgery with Md . pt. Thinks now is  a good time.    HISTORY OF PRESENT ILLNESS   21 y.o. Male with a history of left shulder pain who is a student at [employer]. He states he fell while playing basketball. This was about a week ago. He states he is feeling popping in the shoulder while he is pulling and while putting his shirt on. He has pain while lifting overhead.  He does not recall how he fell. He is referred today by Fercho Abreu PA-C.  He was sent for an outside MRI which he brings with him today.  He states he still in pain and has been wearing a sling.    Is affecting ADLs.  Pain is /10 at it's worst.          REVIEW OF SYSTEMS   Constitution: Negative. Negative for chills, fever and night sweats.   HENT: Negative for congestion and headaches.    Eyes: Negative for blurred vision, left vision loss and right vision loss.   Cardiovascular: Negative for chest pain and syncope.   Respiratory: Negative for cough and shortness of breath.    Endocrine: Negative for polydipsia, polyphagia and polyuria.   Hematologic/Lymphatic: Negative for bleeding problem. Does not bruise/bleed easily.   Skin: Negative for dry skin, itching and rash.   Musculoskeletal: Negative for falls. Positive for left shoulder pain  Gastrointestinal: Negative for abdominal pain and bowel incontinence.   Genitourinary: Negative for bladder incontinence and nocturia.   Neurological: Negative for disturbances in coordination, loss of balance and seizures.   Psychiatric/Behavioral: Negative for depression. The patient does not have insomnia.    Allergic/Immunologic: Negative for hives and persistent infections.     PHYSICAL EXAMINATION    Vitals: /64   Pulse 68   Ht 6' 3" (1.905 m)   Wt 78.5 kg (173 lb)   BMI 21.62 kg/m²     General: The patient appears active and healthy " with no apparent physical problems.  No disturbance of mood or affect is demonstrated. Alert and Oriented.    HEENT: Eyes normal, pupils equally round, nose normal.    Resp: Equal and symmetrical chest rises. No wheezing    CV: Regular rate    Neck: Supple; nonpainful range of motion.    Extremities: no cyanosis, clubbing, edema, or diffuse swelling.  Palpable pulses, good capillary refill of the digits.  No coolness, discoloration, edema or obvious varicosities.    Skin: no lesions noted.    Lymphatic: no detected adenopathy in the upper or lower extremities.    Neurologic: normal mental status, normal reflexes, normal gait and balance.  Patient is alert and oriented to person, place and time.  No flaccidity or spasticity is noted.  No motor or sensory deficits are noted.  Light touch is intact    Orthopaedic:     SHOULDER EXAM - LEFT    Inspection:   Normal skin color and appearance with no scars.  No muscle atrophy noted.  No scapular winging.      Palpation: No tenderness of the acromioclavicular joint, lateral edge of the acromion, biceps tendon, trapezius muscle or scapulothoracic bursa.      ROM:      PROM:     FE - 180°    Abd/ER -  90°  Abd/IR -  45°   Add/ER -  60°     AROM:    FE - 180°    Abd/ER -  90°  Abd/IR -  45°   Add/ER -  60°         Tests:     - Ferguson, - Neer's, - Cross Arm Adduction, + [place], - Yerguson, - Speed. - Belly Press,  - Jobes, - Lift Off    Stability: - sulcus, - apprehension, - relocation, - load and shift, + DLS      Motor:  Rotator cuff strength is 5/5 supraspinatus, 5/5 infraspinatus, 5/5 subscapularis. Biceps, triceps and deltoid strength is 5/5.      Neuro     Distally there are no paresthesias, and sensation is intact to light touch in the median, ulnar, and radial distributions.  Reflexes are 2/2 biceps, triceps and brachioradialis.        IMAGING    X-Ray Shoulder 2 or More Views Left  Narrative: EXAMINATION:  XR SHOULDER COMPLETE 2 OR MORE VIEWS LEFT    CLINICAL  HISTORY:  lt. shoulder pain;Pain in left shoulder    TECHNIQUE:  Internal rotation, 2 scapula Y-views, and axillary view acquired.    COMPARISON:  None    FINDINGS:  No fracture.  No malalignment.  Preserved glenohumeral and acromioclavicular articulations.  No findings calcific tendonitis.  Impression: No acute or significant bony findings.    Electronically signed by: Bishop Rangel  Date:    05/13/2022  Time:    15:54    I reviewed an outside MRI of his left shoulder which demonstrates a posterior superior labral tear.  No other significant findings.    IMPRESSION       ICD-10-CM ICD-9-CM   1. Glenoid labrum tear, left, initial encounter  S43.432A 840.8       MEDICATIONS PRESCRIBED      1. None    RECOMMENDATIONS     1. Continue PT  2.  We did discuss a left shoulder arthroscopic SLAP repair as well as posterior labral repair  3. Schedule surgery for SLAP    All questions were answered, pt will contact us for questions or concerns in the interim.

## 2022-06-21 DIAGNOSIS — S43.432A GLENOID LABRUM TEAR, LEFT, INITIAL ENCOUNTER: ICD-10-CM

## 2022-06-21 DIAGNOSIS — S43.432A TEAR OF LEFT GLENOID LABRUM, INITIAL ENCOUNTER: Primary | ICD-10-CM

## 2022-06-29 ENCOUNTER — TELEPHONE (OUTPATIENT)
Dept: SPORTS MEDICINE | Facility: CLINIC | Age: 22
End: 2022-06-29
Payer: COMMERCIAL

## 2022-06-29 NOTE — TELEPHONE ENCOUNTER
Spoke with patient, rescheduled procedure to 7/26/22.    Dara Lipscomb MS, OTC [facility]    ----- Message from Aby Garduno sent at 6/29/2022 10:42 AM CDT -----  Regarding: Appt access  Pt wanting to R/S procedure  [phone] (home)

## 2022-06-30 ENCOUNTER — PATIENT MESSAGE (OUTPATIENT)
Dept: SPORTS MEDICINE | Facility: CLINIC | Age: 22
End: 2022-06-30
Payer: COMMERCIAL

## 2022-07-11 ENCOUNTER — OFFICE VISIT (OUTPATIENT)
Dept: SPORTS MEDICINE | Facility: CLINIC | Age: 22
End: 2022-07-11
Payer: COMMERCIAL

## 2022-07-11 VITALS
DIASTOLIC BLOOD PRESSURE: 60 MMHG | BODY MASS INDEX: 21.51 KG/M2 | HEIGHT: 75 IN | HEART RATE: 69 BPM | WEIGHT: 173 LBS | SYSTOLIC BLOOD PRESSURE: 115 MMHG

## 2022-07-11 DIAGNOSIS — M25.312 INSTABILITY OF LEFT SHOULDER JOINT: ICD-10-CM

## 2022-07-11 DIAGNOSIS — S43.432A TEAR OF LEFT GLENOID LABRUM, INITIAL ENCOUNTER: Primary | ICD-10-CM

## 2022-07-11 PROCEDURE — 99999 PR PBB SHADOW E&M-EST. PATIENT-LVL III: CPT | Mod: PBBFAC,,, | Performed by: ORTHOPAEDIC SURGERY

## 2022-07-11 PROCEDURE — 99215 PR OFFICE/OUTPT VISIT, EST, LEVL V, 40-54 MIN: ICD-10-PCS | Mod: S$GLB,,, | Performed by: ORTHOPAEDIC SURGERY

## 2022-07-11 PROCEDURE — 99999 PR PBB SHADOW E&M-EST. PATIENT-LVL III: ICD-10-PCS | Mod: PBBFAC,,, | Performed by: ORTHOPAEDIC SURGERY

## 2022-07-11 PROCEDURE — 3074F SYST BP LT 130 MM HG: CPT | Mod: CPTII,S$GLB,, | Performed by: ORTHOPAEDIC SURGERY

## 2022-07-11 PROCEDURE — 3078F DIAST BP <80 MM HG: CPT | Mod: CPTII,S$GLB,, | Performed by: ORTHOPAEDIC SURGERY

## 2022-07-11 PROCEDURE — 99215 OFFICE O/P EST HI 40 MIN: CPT | Mod: S$GLB,,, | Performed by: ORTHOPAEDIC SURGERY

## 2022-07-11 PROCEDURE — 1159F PR MEDICATION LIST DOCUMENTED IN MEDICAL RECORD: ICD-10-PCS | Mod: CPTII,S$GLB,, | Performed by: ORTHOPAEDIC SURGERY

## 2022-07-11 PROCEDURE — 3008F BODY MASS INDEX DOCD: CPT | Mod: CPTII,S$GLB,, | Performed by: ORTHOPAEDIC SURGERY

## 2022-07-11 PROCEDURE — 1159F MED LIST DOCD IN RCRD: CPT | Mod: CPTII,S$GLB,, | Performed by: ORTHOPAEDIC SURGERY

## 2022-07-11 PROCEDURE — 3074F PR MOST RECENT SYSTOLIC BLOOD PRESSURE < 130 MM HG: ICD-10-PCS | Mod: CPTII,S$GLB,, | Performed by: ORTHOPAEDIC SURGERY

## 2022-07-11 PROCEDURE — 3078F PR MOST RECENT DIASTOLIC BLOOD PRESSURE < 80 MM HG: ICD-10-PCS | Mod: CPTII,S$GLB,, | Performed by: ORTHOPAEDIC SURGERY

## 2022-07-11 PROCEDURE — 3008F PR BODY MASS INDEX (BMI) DOCUMENTED: ICD-10-PCS | Mod: CPTII,S$GLB,, | Performed by: ORTHOPAEDIC SURGERY

## 2022-07-11 RX ORDER — HYDROCODONE BITARTRATE AND ACETAMINOPHEN 7.5; 325 MG/1; MG/1
1 TABLET ORAL EVERY 4 HOURS PRN
Qty: 20 TABLET | Refills: 0 | Status: SHIPPED | OUTPATIENT
Start: 2022-07-11 | End: 2022-11-18

## 2022-07-11 RX ORDER — MUPIROCIN 20 MG/G
OINTMENT TOPICAL
Status: CANCELLED | OUTPATIENT
Start: 2022-07-11

## 2022-07-11 NOTE — H&P (VIEW-ONLY)
ESTABLISHED PATIENT    History 7/11/2022:  Marco returns to clinic today to complete perioperative paperwork. He does not report any changes to his condition since last visit.  He wants to proceed with surgery at this time.    History 6/20/222  Pt fell skateboarding with friends since last encounter. Pt feels continous  popping in left shoulder. Pt would like to schedule surgery with Md . pt. Thinks now is  a good time.    History 5/16/2022:  21 y.o. Male with a history of left shulder pain who is a student at [employer]. He states he fell while playing basketball. This was about a week ago. He states he is feeling popping in the shoulder while he is pulling and while putting his shirt on. He has pain while lifting overhead.  He does not recall how he fell. He is referred today by eFrcho Abreu PA-C.  He was sent for an outside MRI which he brings with him today.  He states he still in pain and has been wearing a sling.    Is affecting ADLs.  Pain is /10 at it's worst.        PAST MEDICAL HISTORY    History reviewed. No pertinent past medical history.    PAST SURGICAL HISTORY     History reviewed. No pertinent surgical history.    FAMILY HISTORY    History reviewed. No pertinent family history.    SOCIAL HISTORY    Social History     Socioeconomic History    Marital status: Single       MEDICATIONS      Current Outpatient Medications:     traMADoL (ULTRAM) 50 mg tablet, Take 1 tablet (50 mg total) by mouth every 6 (six) hours as needed for Pain. (Patient not taking: No sig reported), Disp: 12 tablet, Rfl: 0    ALLERGIES     Review of patient's allergies indicates:  No Known Allergies      REVIEW OF SYSTEMS   Constitution: Negative. Negative for chills, fever and night sweats.   HENT: Negative for congestion and headaches.    Eyes: Negative for blurred vision, left vision loss and right vision loss.   Cardiovascular: Negative for chest pain and syncope.   Respiratory: Negative for cough and shortness of breath.   "  Endocrine: Negative for polydipsia, polyphagia and polyuria.   Hematologic/Lymphatic: Negative for bleeding problem. Does not bruise/bleed easily.   Skin: Negative for dry skin, itching and rash.   Musculoskeletal: Negative for falls. Positive for left shoulder pain  Gastrointestinal: Negative for abdominal pain and bowel incontinence.   Genitourinary: Negative for bladder incontinence and nocturia.   Neurological: Negative for disturbances in coordination, loss of balance and seizures.   Psychiatric/Behavioral: Negative for depression. The patient does not have insomnia.    Allergic/Immunologic: Negative for hives and persistent infections.     PHYSICAL EXAMINATION    Vitals: /60   Pulse 69   Ht 6' 3" (1.905 m)   Wt 78.5 kg (173 lb)   BMI 21.62 kg/m²     General: The patient appears active and healthy with no apparent physical problems.  No disturbance of mood or affect is demonstrated. Alert and Oriented.    HEENT: Eyes normal, pupils equally round, nose normal.    Resp: Equal and symmetrical chest rises. No wheezing    CV: Regular rate    Neck: Supple; nonpainful range of motion.    Extremities: no cyanosis, clubbing, edema, or diffuse swelling.  Palpable pulses, good capillary refill of the digits.  No coolness, discoloration, edema or obvious varicosities.    Skin: no lesions noted.    Lymphatic: no detected adenopathy in the upper or lower extremities.    Neurologic: normal mental status, normal reflexes, normal gait and balance.  Patient is alert and oriented to person, place and time.  No flaccidity or spasticity is noted.  No motor or sensory deficits are noted.  Light touch is intact    Orthopaedic:     SHOULDER EXAM - LEFT    Inspection:   Normal skin color and appearance with no scars.  No muscle atrophy noted.  No scapular winging.      Palpation: No tenderness of the acromioclavicular joint, lateral edge of the acromion, biceps tendon, trapezius muscle or scapulothoracic bursa.      ROM:    "   PROM:     FE - 180°    Abd/ER -  90°  Abd/IR -  45°   Add/ER -  60°     AROM:    FE - 180°    Abd/ER -  90°  Abd/IR -  45°   Add/ER -  60°         Tests:     - Ferguson, - Neer's, - Cross Arm Adduction, + [place], - Yerguson, - Speed. - Belly Press,  - Jobes, - Lift Off    Stability: - sulcus, - apprehension, - relocation, - load and shift, + DLS      Motor:  Rotator cuff strength is 5/5 supraspinatus, 5/5 infraspinatus, 5/5 subscapularis. Biceps, triceps and deltoid strength is 5/5.      Neuro     Distally there are no paresthesias, and sensation is intact to light touch in the median, ulnar, and radial distributions.  Reflexes are 2/2 biceps, triceps and brachioradialis.        IMAGING    X-Ray Shoulder 2 or More Views Left  Narrative: EXAMINATION:  XR SHOULDER COMPLETE 2 OR MORE VIEWS LEFT    CLINICAL HISTORY:  lt. shoulder pain;Pain in left shoulder    TECHNIQUE:  Internal rotation, 2 scapula Y-views, and axillary view acquired.    COMPARISON:  None    FINDINGS:  No fracture.  No malalignment.  Preserved glenohumeral and acromioclavicular articulations.  No findings calcific tendonitis.  Impression: No acute or significant bony findings.    Electronically signed by: Bishop Rangel  Date:    05/13/2022  Time:    15:54    I reviewed an outside MRI of his left shoulder which demonstrates a posterior superior labral tear.  No other significant findings.    IMPRESSION       ICD-10-CM ICD-9-CM   1. Tear of left glenoid labrum, initial encounter  S43.432A 840.8   2. Instability of left shoulder joint  M25.312 718.81       MEDICATIONS PRESCRIBED      1. None    RECOMMENDATIONS     1. The patient elected to proceed with operative intervention after all risks, benefits, and alternatives were discussed with the patient.  The risks of surgery include bleeding, scarring, injuries to nerves, arteries and veins, need for additional surgeries, blood clots, infections, and the risk of anesthesia.  I personally obtained informed  consent from the patient and documented full history and physical, which has been placed on the chart.  2. Left shoulder arthroscopy with capsulorrhaphy and superior labrum anterior to posterior lesion repair  3. Referral to physical therapy placed today      All questions were answered, pt will contact us for questions or concerns in the interim.

## 2022-07-11 NOTE — PROGRESS NOTES
ESTABLISHED PATIENT    History 7/11/2022:  Marco returns to clinic today to complete perioperative paperwork. He does not report any changes to his condition since last visit.  He wants to proceed with surgery at this time.    History 6/20/222  Pt fell skateboarding with friends since last encounter. Pt feels continous  popping in left shoulder. Pt would like to schedule surgery with Md . pt. Thinks now is  a good time.    History 5/16/2022:  21 y.o. Male with a history of left shulder pain who is a student at [employer]. He states he fell while playing basketball. This was about a week ago. He states he is feeling popping in the shoulder while he is pulling and while putting his shirt on. He has pain while lifting overhead.  He does not recall how he fell. He is referred today by Fercho Abreu PA-C.  He was sent for an outside MRI which he brings with him today.  He states he still in pain and has been wearing a sling.    Is affecting ADLs.  Pain is /10 at it's worst.        PAST MEDICAL HISTORY    History reviewed. No pertinent past medical history.    PAST SURGICAL HISTORY     History reviewed. No pertinent surgical history.    FAMILY HISTORY    History reviewed. No pertinent family history.    SOCIAL HISTORY    Social History     Socioeconomic History    Marital status: Single       MEDICATIONS      Current Outpatient Medications:     traMADoL (ULTRAM) 50 mg tablet, Take 1 tablet (50 mg total) by mouth every 6 (six) hours as needed for Pain. (Patient not taking: No sig reported), Disp: 12 tablet, Rfl: 0    ALLERGIES     Review of patient's allergies indicates:  No Known Allergies      REVIEW OF SYSTEMS   Constitution: Negative. Negative for chills, fever and night sweats.   HENT: Negative for congestion and headaches.    Eyes: Negative for blurred vision, left vision loss and right vision loss.   Cardiovascular: Negative for chest pain and syncope.   Respiratory: Negative for cough and shortness of breath.   "  Endocrine: Negative for polydipsia, polyphagia and polyuria.   Hematologic/Lymphatic: Negative for bleeding problem. Does not bruise/bleed easily.   Skin: Negative for dry skin, itching and rash.   Musculoskeletal: Negative for falls. Positive for left shoulder pain  Gastrointestinal: Negative for abdominal pain and bowel incontinence.   Genitourinary: Negative for bladder incontinence and nocturia.   Neurological: Negative for disturbances in coordination, loss of balance and seizures.   Psychiatric/Behavioral: Negative for depression. The patient does not have insomnia.    Allergic/Immunologic: Negative for hives and persistent infections.     PHYSICAL EXAMINATION    Vitals: /60   Pulse 69   Ht 6' 3" (1.905 m)   Wt 78.5 kg (173 lb)   BMI 21.62 kg/m²     General: The patient appears active and healthy with no apparent physical problems.  No disturbance of mood or affect is demonstrated. Alert and Oriented.    HEENT: Eyes normal, pupils equally round, nose normal.    Resp: Equal and symmetrical chest rises. No wheezing    CV: Regular rate    Neck: Supple; nonpainful range of motion.    Extremities: no cyanosis, clubbing, edema, or diffuse swelling.  Palpable pulses, good capillary refill of the digits.  No coolness, discoloration, edema or obvious varicosities.    Skin: no lesions noted.    Lymphatic: no detected adenopathy in the upper or lower extremities.    Neurologic: normal mental status, normal reflexes, normal gait and balance.  Patient is alert and oriented to person, place and time.  No flaccidity or spasticity is noted.  No motor or sensory deficits are noted.  Light touch is intact    Orthopaedic:     SHOULDER EXAM - LEFT    Inspection:   Normal skin color and appearance with no scars.  No muscle atrophy noted.  No scapular winging.      Palpation: No tenderness of the acromioclavicular joint, lateral edge of the acromion, biceps tendon, trapezius muscle or scapulothoracic bursa.      ROM:    "   PROM:     FE - 180°    Abd/ER -  90°  Abd/IR -  45°   Add/ER -  60°     AROM:    FE - 180°    Abd/ER -  90°  Abd/IR -  45°   Add/ER -  60°         Tests:     - Ferguson, - Neer's, - Cross Arm Adduction, + Rugby, - Yerguson, - Speed. - Belly Press,  - Jobes, - Lift Off    Stability: - sulcus, - apprehension, - relocation, - load and shift, + DLS      Motor:  Rotator cuff strength is 5/5 supraspinatus, 5/5 infraspinatus, 5/5 subscapularis. Biceps, triceps and deltoid strength is 5/5.      Neuro     Distally there are no paresthesias, and sensation is intact to light touch in the median, ulnar, and radial distributions.  Reflexes are 2/2 biceps, triceps and brachioradialis.        IMAGING    X-Ray Shoulder 2 or More Views Left  Narrative: EXAMINATION:  XR SHOULDER COMPLETE 2 OR MORE VIEWS LEFT    CLINICAL HISTORY:  lt. shoulder pain;Pain in left shoulder    TECHNIQUE:  Internal rotation, 2 scapula Y-views, and axillary view acquired.    COMPARISON:  None    FINDINGS:  No fracture.  No malalignment.  Preserved glenohumeral and acromioclavicular articulations.  No findings calcific tendonitis.  Impression: No acute or significant bony findings.    Electronically signed by: Bishop Rangel  Date:    05/13/2022  Time:    15:54    I reviewed an outside MRI of his left shoulder which demonstrates a posterior superior labral tear.  No other significant findings.    IMPRESSION       ICD-10-CM ICD-9-CM   1. Tear of left glenoid labrum, initial encounter  S43.432A 840.8   2. Instability of left shoulder joint  M25.312 718.81       MEDICATIONS PRESCRIBED      1. None    RECOMMENDATIONS     1. The patient elected to proceed with operative intervention after all risks, benefits, and alternatives were discussed with the patient.  The risks of surgery include bleeding, scarring, injuries to nerves, arteries and veins, need for additional surgeries, blood clots, infections, and the risk of anesthesia.  I personally obtained informed  consent from the patient and documented full history and physical, which has been placed on the chart.  2. Left shoulder arthroscopy with capsulorrhaphy and superior labrum anterior to posterior lesion repair  3. Referral to physical therapy placed today      All questions were answered, pt will contact us for questions or concerns in the interim.

## 2022-07-11 NOTE — H&P
ESTABLISHED PATIENT    History 7/11/2022:  Marco returns to clinic today to complete perioperative paperwork. He does not report any changes to his condition since last visit.  He wants to proceed with surgery at this time.    History 6/20/222  Pt fell skateboarding with friends since last encounter. Pt feels continous  popping in left shoulder. Pt would like to schedule surgery with Md . pt. Thinks now is  a good time.    History 5/16/2022:  21 y.o. Male with a history of left shulder pain who is a student at [employer]. He states he fell while playing basketball. This was about a week ago. He states he is feeling popping in the shoulder while he is pulling and while putting his shirt on. He has pain while lifting overhead.  He does not recall how he fell. He is referred today by Fercho Abreu PA-C.  He was sent for an outside MRI which he brings with him today.  He states he still in pain and has been wearing a sling.    Is affecting ADLs.  Pain is /10 at it's worst.        PAST MEDICAL HISTORY    History reviewed. No pertinent past medical history.    PAST SURGICAL HISTORY     History reviewed. No pertinent surgical history.    FAMILY HISTORY    History reviewed. No pertinent family history.    SOCIAL HISTORY    Social History     Socioeconomic History    Marital status: Single       MEDICATIONS      Current Outpatient Medications:     traMADoL (ULTRAM) 50 mg tablet, Take 1 tablet (50 mg total) by mouth every 6 (six) hours as needed for Pain. (Patient not taking: No sig reported), Disp: 12 tablet, Rfl: 0    ALLERGIES     Review of patient's allergies indicates:  No Known Allergies      REVIEW OF SYSTEMS   Constitution: Negative. Negative for chills, fever and night sweats.   HENT: Negative for congestion and headaches.    Eyes: Negative for blurred vision, left vision loss and right vision loss.   Cardiovascular: Negative for chest pain and syncope.   Respiratory: Negative for cough and shortness of breath.   "  Endocrine: Negative for polydipsia, polyphagia and polyuria.   Hematologic/Lymphatic: Negative for bleeding problem. Does not bruise/bleed easily.   Skin: Negative for dry skin, itching and rash.   Musculoskeletal: Negative for falls. Positive for left shoulder pain  Gastrointestinal: Negative for abdominal pain and bowel incontinence.   Genitourinary: Negative for bladder incontinence and nocturia.   Neurological: Negative for disturbances in coordination, loss of balance and seizures.   Psychiatric/Behavioral: Negative for depression. The patient does not have insomnia.    Allergic/Immunologic: Negative for hives and persistent infections.     PHYSICAL EXAMINATION    Vitals: /60   Pulse 69   Ht 6' 3" (1.905 m)   Wt 78.5 kg (173 lb)   BMI 21.62 kg/m²     General: The patient appears active and healthy with no apparent physical problems.  No disturbance of mood or affect is demonstrated. Alert and Oriented.    HEENT: Eyes normal, pupils equally round, nose normal.    Resp: Equal and symmetrical chest rises. No wheezing    CV: Regular rate    Neck: Supple; nonpainful range of motion.    Extremities: no cyanosis, clubbing, edema, or diffuse swelling.  Palpable pulses, good capillary refill of the digits.  No coolness, discoloration, edema or obvious varicosities.    Skin: no lesions noted.    Lymphatic: no detected adenopathy in the upper or lower extremities.    Neurologic: normal mental status, normal reflexes, normal gait and balance.  Patient is alert and oriented to person, place and time.  No flaccidity or spasticity is noted.  No motor or sensory deficits are noted.  Light touch is intact    Orthopaedic:     SHOULDER EXAM - LEFT    Inspection:   Normal skin color and appearance with no scars.  No muscle atrophy noted.  No scapular winging.      Palpation: No tenderness of the acromioclavicular joint, lateral edge of the acromion, biceps tendon, trapezius muscle or scapulothoracic bursa.      ROM:    "   PROM:     FE - 180°    Abd/ER -  90°  Abd/IR -  45°   Add/ER -  60°     AROM:    FE - 180°    Abd/ER -  90°  Abd/IR -  45°   Add/ER -  60°         Tests:     - Ferguson, - Neer's, - Cross Arm Adduction, + [place], - Yerguson, - Speed. - Belly Press,  - Jobes, - Lift Off    Stability: - sulcus, - apprehension, - relocation, - load and shift, + DLS      Motor:  Rotator cuff strength is 5/5 supraspinatus, 5/5 infraspinatus, 5/5 subscapularis. Biceps, triceps and deltoid strength is 5/5.      Neuro     Distally there are no paresthesias, and sensation is intact to light touch in the median, ulnar, and radial distributions.  Reflexes are 2/2 biceps, triceps and brachioradialis.        IMAGING    X-Ray Shoulder 2 or More Views Left  Narrative: EXAMINATION:  XR SHOULDER COMPLETE 2 OR MORE VIEWS LEFT    CLINICAL HISTORY:  lt. shoulder pain;Pain in left shoulder    TECHNIQUE:  Internal rotation, 2 scapula Y-views, and axillary view acquired.    COMPARISON:  None    FINDINGS:  No fracture.  No malalignment.  Preserved glenohumeral and acromioclavicular articulations.  No findings calcific tendonitis.  Impression: No acute or significant bony findings.    Electronically signed by: Bishop Rangel  Date:    05/13/2022  Time:    15:54    I reviewed an outside MRI of his left shoulder which demonstrates a posterior superior labral tear.  No other significant findings.    IMPRESSION       ICD-10-CM ICD-9-CM   1. Tear of left glenoid labrum, initial encounter  S43.432A 840.8   2. Instability of left shoulder joint  M25.312 718.81       MEDICATIONS PRESCRIBED      1. None    RECOMMENDATIONS     1. The patient elected to proceed with operative intervention after all risks, benefits, and alternatives were discussed with the patient.  The risks of surgery include bleeding, scarring, injuries to nerves, arteries and veins, need for additional surgeries, blood clots, infections, and the risk of anesthesia.  I personally obtained informed  consent from the patient and documented full history and physical, which has been placed on the chart.  2. Left shoulder arthroscopy with capsulorrhaphy and superior labrum anterior to posterior lesion repair  3. Referral to physical therapy placed today      All questions were answered, pt will contact us for questions or concerns in the interim.

## 2022-07-20 ENCOUNTER — PATIENT MESSAGE (OUTPATIENT)
Dept: PREADMISSION TESTING | Facility: HOSPITAL | Age: 22
End: 2022-07-20
Payer: COMMERCIAL

## 2022-07-20 NOTE — ANESTHESIA PAT ROS NOTE
"                                                                                                             07/20/2022  Marco Mcmullen is a 21 y.o., male.  All information is gathered per Chart review via Epic system only.    Pre-op Assessment          Review of Systems  Social:  Non-Smoker, No Alcohol Use 12/2021 [facility] clinic note:  "Alcohol use: No    Drug use: Yes   Types: LSD, Marijuana   Comment: acid, occassionally "       Pulmonary:   Asthma Exercise induced, no Oral maintaince meds but has albuterol inhaler prn      Planned Procedure: Procedure(s) (LRB):  ARTHROSCOPY,SHOULDER,WITH CAPSULORRHAPHY - POLAR CARE (Left)  REPAIR, SLAP LESION, SHOULDER (Left)  Requested Anesthesia Type:General  Surgeon: Jaycee Mcguire MD  Service: Orthopedics  Known or anticipated Date of Surgery:7/26/2022    Previous anesthesia records:None on file    Last PCP note: 6-12 months ago , outside [facility]  note in Epic    6'3  173#  vaccinated      "

## 2022-07-25 ENCOUNTER — ANESTHESIA EVENT (OUTPATIENT)
Dept: SURGERY | Facility: HOSPITAL | Age: 22
End: 2022-07-25
Payer: COMMERCIAL

## 2022-07-25 ENCOUNTER — TELEPHONE (OUTPATIENT)
Dept: SPORTS MEDICINE | Facility: CLINIC | Age: 22
End: 2022-07-25
Payer: COMMERCIAL

## 2022-07-25 NOTE — TELEPHONE ENCOUNTER
----- Message from Tremontana Chevalier sent at 7/25/2022  1:03 PM CDT -----  Regarding: appt  Contact: pt @ [phone] or .  Pt calling to verify time of arrival for proc/surg tomorrow 07/26 with Dr. Mcguire. Pls call pt @ [phone] or .

## 2022-07-25 NOTE — TELEPHONE ENCOUNTER
Spoke to patients mother, informed her arrival time for Surgery tomorrow is 5am. Patients mother verbalized understanding.

## 2022-07-26 ENCOUNTER — TELEPHONE (OUTPATIENT)
Dept: SPORTS MEDICINE | Facility: CLINIC | Age: 22
End: 2022-07-26
Payer: COMMERCIAL

## 2022-07-26 ENCOUNTER — ANESTHESIA (OUTPATIENT)
Dept: SURGERY | Facility: HOSPITAL | Age: 22
End: 2022-07-26
Payer: COMMERCIAL

## 2022-07-26 ENCOUNTER — HOSPITAL ENCOUNTER (OUTPATIENT)
Facility: HOSPITAL | Age: 22
Discharge: HOME | End: 2022-07-26
Attending: ORTHOPAEDIC SURGERY | Admitting: ORTHOPAEDIC SURGERY
Payer: COMMERCIAL

## 2022-07-26 VITALS
HEART RATE: 57 BPM | HEIGHT: 75 IN | SYSTOLIC BLOOD PRESSURE: 129 MMHG | WEIGHT: 173 LBS | TEMPERATURE: 98 F | DIASTOLIC BLOOD PRESSURE: 77 MMHG | BODY MASS INDEX: 21.51 KG/M2 | RESPIRATION RATE: 17 BRPM | OXYGEN SATURATION: 100 %

## 2022-07-26 DIAGNOSIS — M25.312 INSTABILITY OF LEFT SHOULDER JOINT: ICD-10-CM

## 2022-07-26 DIAGNOSIS — Z98.890 S/P ARTHROSCOPY OF LEFT SHOULDER: Primary | ICD-10-CM

## 2022-07-26 DIAGNOSIS — S43.432A TEAR OF LEFT GLENOID LABRUM, INITIAL ENCOUNTER: ICD-10-CM

## 2022-07-26 PROCEDURE — 25000003 PHARM REV CODE 250: Performed by: ANESTHESIOLOGY

## 2022-07-26 PROCEDURE — 99900035 HC TECH TIME PER 15 MIN (STAT)

## 2022-07-26 PROCEDURE — 37000008 HC ANESTHESIA 1ST 15 MINUTES: Performed by: ORTHOPAEDIC SURGERY

## 2022-07-26 PROCEDURE — 63600175 PHARM REV CODE 636 W HCPCS: Performed by: NURSE ANESTHETIST, CERTIFIED REGISTERED

## 2022-07-26 PROCEDURE — D9220A PRA ANESTHESIA: Mod: ANES,,, | Performed by: ANESTHESIOLOGY

## 2022-07-26 PROCEDURE — 76942 ECHO GUIDE FOR BIOPSY: CPT | Mod: 26,,, | Performed by: ANESTHESIOLOGY

## 2022-07-26 PROCEDURE — 76942 PR U/S GUIDANCE FOR NEEDLE GUIDANCE: ICD-10-PCS | Mod: 26,,, | Performed by: ANESTHESIOLOGY

## 2022-07-26 PROCEDURE — 27201423 OPTIME MED/SURG SUP & DEVICES STERILE SUPPLY: Performed by: ORTHOPAEDIC SURGERY

## 2022-07-26 PROCEDURE — 94761 N-INVAS EAR/PLS OXIMETRY MLT: CPT

## 2022-07-26 PROCEDURE — 63600175 PHARM REV CODE 636 W HCPCS: Performed by: ANESTHESIOLOGY

## 2022-07-26 PROCEDURE — 25000003 PHARM REV CODE 250: Performed by: NURSE ANESTHETIST, CERTIFIED REGISTERED

## 2022-07-26 PROCEDURE — D9220A PRA ANESTHESIA: ICD-10-PCS | Mod: CRNA,,, | Performed by: NURSE ANESTHETIST, CERTIFIED REGISTERED

## 2022-07-26 PROCEDURE — 71000039 HC RECOVERY, EACH ADD'L HOUR: Performed by: ORTHOPAEDIC SURGERY

## 2022-07-26 PROCEDURE — 76942 ECHO GUIDE FOR BIOPSY: CPT | Performed by: ANESTHESIOLOGY

## 2022-07-26 PROCEDURE — 64416 NJX AA&/STRD BRCH PL NFS IMG: CPT | Performed by: ANESTHESIOLOGY

## 2022-07-26 PROCEDURE — D9220A PRA ANESTHESIA: Mod: CRNA,,, | Performed by: NURSE ANESTHETIST, CERTIFIED REGISTERED

## 2022-07-26 PROCEDURE — 36000711: Performed by: ORTHOPAEDIC SURGERY

## 2022-07-26 PROCEDURE — C1713 ANCHOR/SCREW BN/BN,TIS/BN: HCPCS | Performed by: ORTHOPAEDIC SURGERY

## 2022-07-26 PROCEDURE — 37000009 HC ANESTHESIA EA ADD 15 MINS: Performed by: ORTHOPAEDIC SURGERY

## 2022-07-26 PROCEDURE — 64416 PR NERVE BLOCK INJ, ANES/STEROID, BRACHIAL PLEXUS, CONT INFUSION, INCL IMAG GUIDANCE: ICD-10-PCS | Mod: 59,LT,, | Performed by: ANESTHESIOLOGY

## 2022-07-26 PROCEDURE — D9220A PRA ANESTHESIA: ICD-10-PCS | Mod: ANES,,, | Performed by: ANESTHESIOLOGY

## 2022-07-26 PROCEDURE — 36000710: Performed by: ORTHOPAEDIC SURGERY

## 2022-07-26 PROCEDURE — 29806 SHO ARTHRS SRG CAPSULORRAPHY: CPT | Mod: LT,,, | Performed by: ORTHOPAEDIC SURGERY

## 2022-07-26 PROCEDURE — 71000015 HC POSTOP RECOV 1ST HR: Performed by: ORTHOPAEDIC SURGERY

## 2022-07-26 PROCEDURE — 64416 NJX AA&/STRD BRCH PL NFS IMG: CPT | Mod: 59,LT,, | Performed by: ANESTHESIOLOGY

## 2022-07-26 PROCEDURE — 29806 PR SHLDR ARTHROSCOP,SURG,CAPSULORRHAPHY: ICD-10-PCS | Mod: LT,,, | Performed by: ORTHOPAEDIC SURGERY

## 2022-07-26 PROCEDURE — 25000003 PHARM REV CODE 250: Performed by: ORTHOPAEDIC SURGERY

## 2022-07-26 PROCEDURE — 71000033 HC RECOVERY, INTIAL HOUR: Performed by: ORTHOPAEDIC SURGERY

## 2022-07-26 DEVICE — IMPLANTABLE DEVICE: Type: IMPLANTABLE DEVICE | Site: SHOULDER | Status: FUNCTIONAL

## 2022-07-26 RX ORDER — MUPIROCIN 20 MG/G
OINTMENT TOPICAL
Status: DISCONTINUED | OUTPATIENT
Start: 2022-07-26 | End: 2022-07-26 | Stop reason: HOSPADM

## 2022-07-26 RX ORDER — ALBUTEROL SULFATE 90 UG/1
2 AEROSOL, METERED RESPIRATORY (INHALATION) EVERY 6 HOURS PRN
COMMUNITY
Start: 2021-12-22 | End: 2022-11-18

## 2022-07-26 RX ORDER — CEFAZOLIN SODIUM 1 G/3ML
INJECTION, POWDER, FOR SOLUTION INTRAMUSCULAR; INTRAVENOUS
Status: DISCONTINUED | OUTPATIENT
Start: 2022-07-26 | End: 2022-07-26

## 2022-07-26 RX ORDER — CARBOXYMETHYLCELLULOSE SODIUM 5 MG/ML
SOLUTION/ DROPS OPHTHALMIC
Status: DISCONTINUED | OUTPATIENT
Start: 2022-07-26 | End: 2022-07-26

## 2022-07-26 RX ORDER — ONDANSETRON 2 MG/ML
4 INJECTION INTRAMUSCULAR; INTRAVENOUS DAILY PRN
Status: DISCONTINUED | OUTPATIENT
Start: 2022-07-26 | End: 2022-07-26 | Stop reason: HOSPADM

## 2022-07-26 RX ORDER — LIDOCAINE HYDROCHLORIDE 20 MG/ML
INJECTION INTRAVENOUS
Status: DISCONTINUED | OUTPATIENT
Start: 2022-07-26 | End: 2022-07-26

## 2022-07-26 RX ORDER — METHOCARBAMOL 500 MG/1
1000 TABLET, FILM COATED ORAL ONCE
Status: COMPLETED | OUTPATIENT
Start: 2022-07-26 | End: 2022-07-26

## 2022-07-26 RX ORDER — FENTANYL CITRATE 50 UG/ML
INJECTION, SOLUTION INTRAMUSCULAR; INTRAVENOUS
Status: DISCONTINUED | OUTPATIENT
Start: 2022-07-26 | End: 2022-07-26

## 2022-07-26 RX ORDER — NEOSTIGMINE METHYLSULFATE 0.5 MG/ML
INJECTION, SOLUTION INTRAVENOUS
Status: DISCONTINUED | OUTPATIENT
Start: 2022-07-26 | End: 2022-07-26

## 2022-07-26 RX ORDER — ACETAMINOPHEN 500 MG
1000 TABLET ORAL ONCE
Status: COMPLETED | OUTPATIENT
Start: 2022-07-26 | End: 2022-07-26

## 2022-07-26 RX ORDER — ROPIVACAINE HYDROCHLORIDE 5 MG/ML
INJECTION, SOLUTION EPIDURAL; INFILTRATION; PERINEURAL
Status: COMPLETED | OUTPATIENT
Start: 2022-07-26 | End: 2022-07-26

## 2022-07-26 RX ORDER — CEFAZOLIN SODIUM 2 G/50ML
2 SOLUTION INTRAVENOUS
Status: DISCONTINUED | OUTPATIENT
Start: 2022-07-26 | End: 2022-07-26 | Stop reason: HOSPADM

## 2022-07-26 RX ORDER — OXYCODONE HYDROCHLORIDE 5 MG/1
5 TABLET ORAL
Status: DISCONTINUED | OUTPATIENT
Start: 2022-07-26 | End: 2022-07-26 | Stop reason: HOSPADM

## 2022-07-26 RX ORDER — SODIUM CHLORIDE 0.9 % (FLUSH) 0.9 %
10 SYRINGE (ML) INJECTION
Status: DISCONTINUED | OUTPATIENT
Start: 2022-07-26 | End: 2022-07-26 | Stop reason: HOSPADM

## 2022-07-26 RX ORDER — MUPIROCIN 20 MG/G
OINTMENT TOPICAL
COMMUNITY
End: 2022-11-18

## 2022-07-26 RX ORDER — DEXMEDETOMIDINE HYDROCHLORIDE 100 UG/ML
INJECTION, SOLUTION INTRAVENOUS
Status: DISCONTINUED | OUTPATIENT
Start: 2022-07-26 | End: 2022-07-26

## 2022-07-26 RX ORDER — SODIUM CHLORIDE 9 MG/ML
INJECTION, SOLUTION INTRAVENOUS CONTINUOUS
Status: DISCONTINUED | OUTPATIENT
Start: 2022-07-26 | End: 2022-07-26 | Stop reason: HOSPADM

## 2022-07-26 RX ORDER — MIDAZOLAM HYDROCHLORIDE 1 MG/ML
.5-4 INJECTION INTRAMUSCULAR; INTRAVENOUS
Status: DISCONTINUED | OUTPATIENT
Start: 2022-07-26 | End: 2022-07-26 | Stop reason: HOSPADM

## 2022-07-26 RX ORDER — CELECOXIB 200 MG/1
400 CAPSULE ORAL ONCE
Status: COMPLETED | OUTPATIENT
Start: 2022-07-26 | End: 2022-07-26

## 2022-07-26 RX ORDER — KETAMINE HCL IN 0.9 % NACL 50 MG/5 ML
SYRINGE (ML) INTRAVENOUS
Status: DISCONTINUED | OUTPATIENT
Start: 2022-07-26 | End: 2022-07-26

## 2022-07-26 RX ORDER — DEXAMETHASONE SODIUM PHOSPHATE 4 MG/ML
INJECTION, SOLUTION INTRA-ARTICULAR; INTRALESIONAL; INTRAMUSCULAR; INTRAVENOUS; SOFT TISSUE
Status: DISCONTINUED | OUTPATIENT
Start: 2022-07-26 | End: 2022-07-26

## 2022-07-26 RX ORDER — ROPIVACAINE HYDROCHLORIDE 2 MG/ML
INJECTION, SOLUTION EPIDURAL; INFILTRATION; PERINEURAL CONTINUOUS
Status: DISCONTINUED | OUTPATIENT
Start: 2022-07-26 | End: 2022-07-26 | Stop reason: HOSPADM

## 2022-07-26 RX ORDER — PROPOFOL 10 MG/ML
VIAL (ML) INTRAVENOUS
Status: DISCONTINUED | OUTPATIENT
Start: 2022-07-26 | End: 2022-07-26

## 2022-07-26 RX ORDER — FENTANYL CITRATE 50 UG/ML
25-200 INJECTION, SOLUTION INTRAMUSCULAR; INTRAVENOUS
Status: DISCONTINUED | OUTPATIENT
Start: 2022-07-26 | End: 2022-07-26 | Stop reason: HOSPADM

## 2022-07-26 RX ORDER — ROCURONIUM BROMIDE 10 MG/ML
INJECTION, SOLUTION INTRAVENOUS
Status: DISCONTINUED | OUTPATIENT
Start: 2022-07-26 | End: 2022-07-26

## 2022-07-26 RX ADMIN — LIDOCAINE HYDROCHLORIDE 100 MG: 20 INJECTION, SOLUTION INTRAVENOUS at 07:07

## 2022-07-26 RX ADMIN — FENTANYL CITRATE 100 MCG: 50 INJECTION INTRAMUSCULAR; INTRAVENOUS at 06:07

## 2022-07-26 RX ADMIN — CELECOXIB 400 MG: 200 CAPSULE ORAL at 05:07

## 2022-07-26 RX ADMIN — ROCURONIUM BROMIDE 50 MG: 10 INJECTION, SOLUTION INTRAVENOUS at 07:07

## 2022-07-26 RX ADMIN — SODIUM CHLORIDE, SODIUM GLUCONATE, SODIUM ACETATE, POTASSIUM CHLORIDE, MAGNESIUM CHLORIDE, SODIUM PHOSPHATE, DIBASIC, AND POTASSIUM PHOSPHATE: .53; .5; .37; .037; .03; .012; .00082 INJECTION, SOLUTION INTRAVENOUS at 07:07

## 2022-07-26 RX ADMIN — CARBOXYMETHYLCELLULOSE SODIUM 2 DROP: 5 SOLUTION/ DROPS OPHTHALMIC at 07:07

## 2022-07-26 RX ADMIN — ACETAMINOPHEN 1000 MG: 500 TABLET ORAL at 05:07

## 2022-07-26 RX ADMIN — FENTANYL CITRATE 100 MCG: 50 INJECTION INTRAMUSCULAR; INTRAVENOUS at 07:07

## 2022-07-26 RX ADMIN — Medication: at 08:07

## 2022-07-26 RX ADMIN — GLYCOPYRROLATE 0.8 MG: 0.2 INJECTION, SOLUTION INTRAMUSCULAR; INTRAVENOUS at 08:07

## 2022-07-26 RX ADMIN — DEXMEDETOMIDINE HYDROCHLORIDE 8 MCG: 100 INJECTION, SOLUTION INTRAVENOUS at 06:07

## 2022-07-26 RX ADMIN — DEXAMETHASONE SODIUM PHOSPHATE 8 MG: 4 INJECTION INTRA-ARTICULAR; INTRALESIONAL; INTRAMUSCULAR; INTRAVENOUS; SOFT TISSUE at 07:07

## 2022-07-26 RX ADMIN — PROPOFOL 200 MG: 10 INJECTION, EMULSION INTRAVENOUS at 07:07

## 2022-07-26 RX ADMIN — OXYCODONE 5 MG: 5 TABLET ORAL at 10:07

## 2022-07-26 RX ADMIN — MIDAZOLAM 2 MG: 1 INJECTION INTRAMUSCULAR; INTRAVENOUS at 06:07

## 2022-07-26 RX ADMIN — METHOCARBAMOL 1000 MG: 500 TABLET ORAL at 10:07

## 2022-07-26 RX ADMIN — SODIUM CHLORIDE: 0.9 INJECTION, SOLUTION INTRAVENOUS at 05:07

## 2022-07-26 RX ADMIN — ROPIVACAINE HYDROCHLORIDE 10 ML: 5 INJECTION, SOLUTION EPIDURAL; INFILTRATION; PERINEURAL at 06:07

## 2022-07-26 RX ADMIN — CEFAZOLIN 2 G: 330 INJECTION, POWDER, FOR SOLUTION INTRAMUSCULAR; INTRAVENOUS at 07:07

## 2022-07-26 RX ADMIN — Medication 20 MG: at 07:07

## 2022-07-26 RX ADMIN — MUPIROCIN: 20 OINTMENT TOPICAL at 05:07

## 2022-07-26 RX ADMIN — NEOSTIGMINE METHYLSULFATE 4 MG: 0.5 INJECTION INTRAVENOUS at 08:07

## 2022-07-26 RX ADMIN — SODIUM CHLORIDE: 9 INJECTION, SOLUTION INTRAVENOUS at 05:07

## 2022-07-26 NOTE — OP NOTE
DATE OF PROCEDURE: 7/26/2022    ATTENDING SURGEON: Surgeon(s) and Role:     * Jaycee Mcguire MD - Primary    ASSISTANTS:  None     PREOPERATIVE DIAGNOSIS:  Left shoulder Shoulder Instability M25.319     POSTOPERATIVE DIAGNOSIS:   Left shoulder Dislocation, Prosterior S43.006A and Shoulder Instability M25.319     PROCEDURES PERFORMED:  1. Left shoulder Arthroscopic posterior labral repair with capsulorrhaphy CPT - 23676    Surgeon(s) and Role:     * Jaycee Mcguire MD - Primary    Assisting Surgeon: None    Anesthesia: General    Complications: None    Implants:   Implant Name Type Inv. Item Serial No.  Lot No. LRB No. Used Action   1.8 KNOTLESS FIBERTAK ANCHOR    [employer] [id number] Left 2 Implanted       Arthroscopic Findings:   Evaluation of the glenohumeral joint demonstrated no articular lesions to the glenoid or to the humerus.  Evaluation of the labrum demonstrated a posterior labral tear extending from the proximal 2:00 a.m. to the 5 o'clock position.  No other labral pathology was noted.  The articular side of the rotator cuff was intact.  No synovitis was present.    Indication for Procedure:  The patient is a 21-year-old male who injured his right shoulder while playing basketball earlier this year.  He was initially treated with nonsurgical management however continued to have pain and popping sensations in his shoulder.  History, physical and imaging were consistent with the above-stated diagnosis.  Risks, benefits and alternatives were discussed with the patient prior to proceeding with surgery.    Surgery in Detail:  The patient was marked identified in the holding room.  He was brought back to the operating room and placed in the lateral decubitus position.  After general endotracheal anesthesia was administered the left upper extremity was prepped and draped in usual sterile fashion for a shoulder arthroscopy. The body was secured and well padded in a bean bag. Preoperative antibiotics  were given within 1 hour the procedure.  A time-out was taken prior starting.  A posterior portal was created after insufflation of the joint with sterile saline.  A diagnostic arthroscopy was performed with the above-stated findings.    Under direct visualization an anterior superior and anterior inferior portal were created and cannulas were instrumented into the joint.  A cannula was then placed into the posterior portal.  Debridement was done of the posterior labral tear.  We then brought in an elevator to mobilize the tissue.  A small 4.5 mm hooded bur was then brought in to prepare the posterior labral repair site and to debride the area of the posterior glenoid.  Using accessory posterior lateral portal, knotless FiberTak anchors were placed and passed with the assistance of a Spectrum.  The posterior labrum was reduced to the glenoid for an anatomic repair.    The arthroscopes were removed from the joint and sterile dressings were applied. The patient was extubated and taken recovery in satisfactory condition.      Post-Op Plan:  Posterior labral repair protocol    Attestation: I was present and scrubbed for the entire procedure.

## 2022-07-26 NOTE — PATIENT INSTRUCTIONS
POST-OPERATIVE DISCHARGE INSTRUCTIONS    Diet: Ice chips, clear liquids, and then diet as tolerated.  Drink plenty of liquids.  Ice the area at least three times a day (20 minutes per session).    Elevate the extremity above the level of the heart to help reduce swelling.  Pain medication can be taken every four to six hours as needed.  It is helpful to take pain medication prior to physical therapy.  Any activity that requires precise thinking or accuracy should be avoided for a minimum of 72 hours after surgery and while on narcotic pain medication.  This includes operating machinery and/or driving a vehicle.  All sutures/staples will be removed approximately 14 days from the time of surgery. Leave steri-strips (skin tapes) in place until sutures are removed.  If skin glue is used instead of stitches, do not apply any ointments or solutions to the incision.  Keep the incision dry.  The skin glue will peel off in 3-4 weeks.  Dressing change directions, unless otherwise instructed:     ? Shoulder scope:  Remove dressings 48 hours after surgery and start using waterproof Band-Aids over the incision sites.      All casts, splints, braces, slings, crutches, abduction pillows, etc. are to be worn as instructed.    Joe Hose are often used following knee surgery to help with swelling.  They can be removed for 2-3 hours daily as needed.  If the hose become uncomfortable after a few days, switch to an Ace Wrap if desired.  Keep the incision dry for 10-14 days.  A waterproof dressing ([facility] or [facility]) can be used to shower.  No bath, pool, or hot tub until instructed.  You should notify our office if you notice any of the following:  A temperature greater than 101 F  Severe increased pain  Excessive drainage or redness of the incision  Calf swelling and pain  Chest pain  Your post-op follow up appointment will be approximately 14 days from the time of surgery.  If you do not have an appointment scheduled, please call our  office and schedule within 1-2 days.   14.         If you have any problems or questions, please call our office at [phone].

## 2022-07-26 NOTE — PLAN OF CARE
Pt is LOC x4. Discharge & Nimbus instructions reviewed with pt & mother; verbalizes understanding. Dressings dry & intact. Pt denies any nausea @ present. Pt c/o 4/10 pain @ present. Arc sling and polar care  given for home discharge. AVS given & prescriptions given @ bedside  Consents in chart.

## 2022-07-26 NOTE — TRANSFER OF CARE
"Anesthesia Transfer of Care Note    Patient: Marco Mcmullen    Procedure(s) Performed: Procedure(s) (LRB):  ARTHROSCOPY,SHOULDER,WITH CAPSULORRHAPHY - POLAR CARE (Left)  REPAIR, SLAP LESION, SHOULDER (Left)    Patient location: PACU    Anesthesia Type: general    Transport from OR: Transported from OR on 6-10 L/min O2 by face mask with adequate spontaneous ventilation    Post pain: adequate analgesia    Post assessment: no apparent anesthetic complications    Level of consciousness: sedated    Nausea/Vomiting: no nausea/vomiting    Complications: none    Transfer of care protocol was followed      Last vitals:   Visit Vitals  /65 (BP Location: Right arm, Patient Position: Lying)   Pulse 63   Temp 36.7 °C (98 °F) (Oral)   Resp 17   Ht 6' 3" (1.905 m)   Wt 78.5 kg (173 lb)   SpO2 100%   BMI 21.62 kg/m²     "

## 2022-07-26 NOTE — ANESTHESIA PROCEDURE NOTES
Intubation    Date/Time: 7/26/2022 7:09 AM  Performed by: Tom Lieberman CRNA  Authorized by: Katlyn Pedraza MD     Intubation:     Induction:  Intravenous    Intubated:  Postinduction    Mask Ventilation:  Easy mask    Attempts:  1    Attempted By:  CRNA    Method of Intubation:  Direct    Blade:  Kay 2    Laryngeal View Grade: Grade I - full view of cords      Difficult Airway Encountered?: No      Complications:  None    Airway Device:  Oral endotracheal tube    Airway Device Size:  7.0    Style/Cuff Inflation:  Cuffed (inflated to minimal occlusive pressure)    Inflation Amount (mL):  6    Tube secured:  21    Secured at:  The lips    Placement Verified By:  Capnometry    Complicating Factors:  None    Findings Post-Intubation:  BS equal bilateral and atraumatic/condition of teeth unchanged

## 2022-07-26 NOTE — PLAN OF CARE
Need site marked, updated h&p and anesthesia consent.    Patient paid for [facility].

## 2022-07-26 NOTE — ANESTHESIA PROCEDURE NOTES
Left interscalene catheter    Patient location during procedure: pre-op   Block not for primary anesthetic.  Reason for block: at surgeon's request and post-op pain management   Post-op Pain Location: Left shoulder pain   Start time: 7/26/2022 6:36 AM  Timeout: 7/26/2022 6:35 AM   End time: 7/26/2022 6:50 AM    Staffing  Authorizing Provider: Katlyn Pedraza MD  Performing Provider: Katlyn Pedraza MD    Preanesthetic Checklist  Completed: patient identified, IV checked, site marked, risks and benefits discussed, surgical consent, monitors and equipment checked, pre-op evaluation and timeout performed  Peripheral Block  Patient position: sitting  Prep: ChloraPrep and site prepped and draped  Patient monitoring: heart rate, cardiac monitor, continuous pulse ox, continuous capnometry and frequent blood pressure checks  Block type: interscalene  Laterality: left  Injection technique: continuous  Needle  Needle type: Tuohy   Needle gauge: 18 G  Needle length: 2 in  Needle localization: anatomical landmarks and ultrasound guidance  Catheter type: non-stimulating  Catheter size: 20 G  Test dose: lidocaine 1.5% with Epi 1-to-200,000 and negative   -ultrasound image captured on disc.  Assessment  Injection assessment: negative aspiration, negative parasthesia and local visualized surrounding nerve  Paresthesia pain: none  Heart rate change: no  Slow fractionated injection: yes  Pain Tolerance: comfortable throughout block and no complaints  Medications:    Medications: ropivacaine (NAROPIN) injection 0.5% - Perineural   10 mL - 7/26/2022 6:45:00 AM    Additional Notes  VSS.  [facility] RN monitoring vitals throughout procedure.  Patient tolerated procedure well.     With 10mL normal saline, 1:200,000 epi

## 2022-07-26 NOTE — ANESTHESIA PREPROCEDURE EVALUATION
"                                                                                                             07/26/2022  Marco Mcmullen is a 21 y.o., male.    Procedure Summary    Case: [id number] Date/Time: 07/26/22 0700   Procedures:        ARTHROSCOPY,SHOULDER,WITH CAPSULORRHAPHY - POLAR CARE (Left Shoulder) - regional block       REPAIR, SLAP LESION, SHOULDER (Left Shoulder) - regional block   Anesthesia type: General   Diagnosis: Tear of left glenoid labrum, initial encounter [S43.432A]     Patient Active Problem List   Diagnosis    Glenoid labrum tear, left, initial encounter    Decreased range of motion of left shoulder    Scapular dyskinesis    Acute pain of left shoulder due to trauma     Past Surgical History:   Procedure Laterality Date    WISDOM TOOTH EXTRACTION       Current Discharge Medication List      CONTINUE these medications which have NOT CHANGED    Details   albuterol (PROVENTIL/VENTOLIN HFA) 90 mcg/actuation inhaler Inhale 2 puffs into the lungs every 6 (six) hours as needed.      HYDROcodone-acetaminophen (NORCO) 7.5-325 mg per tablet Take 1 tablet by mouth every 4 (four) hours as needed for Pain.  Qty: 20 tablet, Refills: 0    Comments: Quantity prescribed more than 7 day supply? No      mupirocin (BACTROBAN) 2 % ointment 1 application      traMADoL (ULTRAM) 50 mg tablet Take 1 tablet (50 mg total) by mouth every 6 (six) hours as needed for Pain.  Qty: 12 tablet, Refills: 0                 Pre-op Assessment    I have reviewed the Patient Summary Reports.     I have reviewed the Nursing Notes. I have reviewed the NPO Status.   I have reviewed the Medications.     Review of Systems  Anesthesia Hx:  Denies Family Hx of Anesthesia complications.   Denies Personal Hx of Anesthesia complications.   Social:  Non-Smoker, No Alcohol Use 12/2021 [facility] clinic note:  "Alcohol use: No    Drug use: Yes   Types: LSD, Marijuana   Comment: acid, occassionally "       Pulmonary:   Asthma Exercise " induced, no Oral maintaince meds but has albuterol inhaler prn       Physical Exam    Airway:  Mallampati: II   Mouth Opening: Normal  TM Distance: Normal  Tongue: Normal  Neck ROM: Normal ROM    Dental:  Intact        Anesthesia Plan  Type of Anesthesia, risks & benefits discussed:    Anesthesia Type: Gen ETT, Regional  Intra-op Monitoring Plan: Standard ASA Monitors  Post Op Pain Control Plan: multimodal analgesia, peripheral nerve block and IV/PO Opioids PRN  Induction:  IV  Airway Plan: Direct  ASA Score: 2    Ready For Surgery From Anesthesia Perspective.     .

## 2022-07-26 NOTE — BRIEF OP NOTE
[facility]  Brief Operative Note    Surgery Date: 7/26/2022     Surgeon(s) and Role:     * Jaycee Mcguire MD - Primary    Assisting Surgeon: None    Pre-Operative Diagnosis: Tear of left glenoid labrum, initial encounter [S43.432A]    Post-Operative Diagnosis: Post-Op Diagnosis Codes:     * Tear of left glenoid labrum, initial encounter [S43.432A]    Procedure(s) (LRB):  ARTHROSCOPY,SHOULDER,WITH CAPSULORRHAPHY - POLAR CARE (Left)  REPAIR, SLAP LESION, SHOULDER (Left)    Anesthesia: General    Operative Findings: See Op note.    Estimated Blood Loss: * No values recorded between 7/26/2022  7:32 AM and 7/26/2022  8:20 AM *         Specimens:   Specimen (24h ago, onward)            None            Discharge Note    OUTCOME: Patient tolerated treatment/procedure well without complication and is now ready for discharge.    DISPOSITION: Home or Self Care    FINAL DIAGNOSIS:     * Tear of left glenoid labrum, initial encounter [S43.432A]    FOLLOWUP: In clinic    DISCHARGE INSTRUCTIONS: See attached.

## 2022-07-27 NOTE — ANESTHESIA POST-OP PAIN MANAGEMENT
"Acute Pain Service Progress Note      7/27/2022 1439    Patient contacted regarding [facility] infusion follow up. Reports that pain has been well controlled with the infusion pump. Denies signs of local anesthetic toxicity. PNC dressing remains clean, dry, and intact. All questions answered. Reminded patient that the infusion should be discontinued and PNC removed whenever the "infusion complete" alert is seen on the display screen or by POD 5 (7/30/22) at 12pm, whichever comes first. Encouraged patient to call the [facility] 24/7 support line at [phone] or the [facility] line at [phone] for any [facility] related questions/issues. Verbalizes understanding. Will continue to follow up.      "

## 2022-07-28 ENCOUNTER — CLINICAL SUPPORT (OUTPATIENT)
Dept: REHABILITATION | Facility: HOSPITAL | Age: 22
End: 2022-07-28
Payer: COMMERCIAL

## 2022-07-28 DIAGNOSIS — Z98.890 S/P ARTHROSCOPY OF LEFT SHOULDER: ICD-10-CM

## 2022-07-28 DIAGNOSIS — S43.432A TEAR OF LEFT GLENOID LABRUM, INITIAL ENCOUNTER: ICD-10-CM

## 2022-07-28 PROCEDURE — 97162 PT EVAL MOD COMPLEX 30 MIN: CPT | Performed by: PHYSICAL THERAPIST

## 2022-07-28 PROCEDURE — 97140 MANUAL THERAPY 1/> REGIONS: CPT | Performed by: PHYSICAL THERAPIST

## 2022-07-28 PROCEDURE — 97110 THERAPEUTIC EXERCISES: CPT | Performed by: PHYSICAL THERAPIST

## 2022-07-28 NOTE — PLAN OF CARE
[facility]  Physical Therapy Initial Evaluation    Name: Marco Vallecillo  Clinic Number: [id number]    Therapy Diagnosis:   Encounter Diagnoses   Name Primary?    S/P arthroscopy of left shoulder     Tear of left glenoid labrum, initial encounter      Physician: Jaycee Mcguire MD    Physician Orders: PT Eval and Treat   Medical Diagnosis from Referral:   Z98.890 (ICD-10-CM) - S/P arthroscopy of left shoulder   S43.432A (ICD-10-CM) - Tear of left glenoid labrum, initial encounter     Evaluation Date: 7/28/2022  Authorization Period Expiration: 12/31/2022  Plan of Care Expiration: 10/28/2022  Visit # / Visits authorized: 1/ 1  FOTO: 1/3      Time In: 145  Time Out: 245  Total Billable Time: 60 minutes     Precautions: Standard    Subjective   Date of onset: 7/26/2022  History of current condition - Marco reports: S/P Left labral repair on 7/26/2022. Pt reports performing well in rehab but had another incident to where left shoulder started hurting again. Initial incident came when he was playing basketball, did a half turn and was pushed down in which he landed on an outstretched arm. Pt donning brace at this time which limits internal rotation. Pain pump is present and working. No sharp pains at this time. Does note increase in pain at nighttime especially but takes medicine and it subsides.       Medical History:   Past Medical History:   Diagnosis Date    Anxiety disorder, unspecified     Mild intermittent asthma, uncomplicated        Surgical History:   Marco Vallecillo  has a past surgical history that includes [place] tooth extraction; arthroscopy, shoulder, with capsulorrhaphy (Left, 7/26/2022); and Repair of superior labral anterior-to-posterior (SLAP) tear of shoulder (Left, 7/26/2022).    Medications:   Marco has a current medication list which includes the following prescription(s): albuterol, hydrocodone-acetaminophen, mupirocin, and tramadol.    Allergies:   Review of  "patient's allergies indicates:   Allergen Reactions    Peanut      "Throat swells up"      Prior Therapy: Yes  Social History:  Lives with mom right now, attends [employer] which he will begin going to classes soon. Plan to eventually move back to [place] and continue PT at [facility].   Occupation: Student  Prior Level of Function: able to use arm in all planes with no pain  Current Level of Function: unable to perform ADL's such as dressing and eating without assistance.     Pain:   Current 4/10, worst 8/10, best 0/10   Location: left shoulder   Description: Aching, Dull, Throbbing, Grabbing and Tight  Aggravating Factors: Sitting and Laying  Easing Factors: pain medication    Pts goals: get back function of left arm / shoulder to improve quality of life.     Objective     Posture: rounded shoulders bilaterally.   Palpation: tenderness along full left shoulder girdle  Sensation: no deficits noted    Range of Motion/Strength:   CERVICAL AROM Pain/Dysfunction with Movement   Flexion Full    Extension Full    Right rotation Full    Left rotation Full        Shoulder Right Left Pain/Dysfunction with Movement   PROM      flexion Full 60    extension Full Neutral    abduction Full 55    Internal rotation Full Neutral    ER at 90° abd Full NT    ER at 0° abd Full 35      Elbow Right Left Pain/Dysfunction with Movement   AROM/PROM Full Full      U/E MMT Right Left Pain/Dysfunction with Movement   Shoulder Flexion 5/5 NT    Shoulder Extension 5/5 NT    Shoulder Abduction 4+/5 NT    Shoulder IR 5/5 NT    Shoulder ER  @ 0* Abduction 4+/5 NT    Elbow Flexion  5/5 3+/5    Elbow Extension 5/5 3+/5      Special Tests: NT    CMS Impairment/Limitation/Restriction for FOTO Shoulder Survey    Therapist reviewed FOTO scores for Marco Gouldmartha Vallecillo on 7/28/2022.   FOTO documents entered into [employer] - see Media section.    Limitation Score: 96%       TREATMENT   Treatment Time In: 215  Treatment Time Out: 245  Total Treatment time " "separate from Evaluation: 30 minutes    Marco received therapeutic exercises to develop strength, endurance, ROM, flexibility, posture and core stabilization for 20 minutes including:    Supine biceps curls 20x. No weight  Hand / wrist - extension and flexion 20x each  Hand  on brace 5" hold, 5x  Pendulums: all directions 1 minute  Scapular retractions 2x10 seated  Brace doffed and donned    Marco received the following manual therapy techniques: Joint mobilizations, Myofacial release and Soft tissue Mobilization were applied to the: left shoulder for 10 minutes, including:    Passive range of motion in flexion, abduction, external rotation to tolerance       Home Exercises Provided and Patient Education Provided     Education provided:   - Brace education on donning and doffing  - Home modalities as needed  - HEP given and demonstrated  - No internal rotation at this time - reaching toward stomach or behind back    Written Home Exercises Provided: yes.  Exercises were reviewed and Marco was able to demonstrate them prior to the end of the session.  Marco demonstrated good  understanding of the education provided.     See EMR under Patient Instructions for exercises provided 7/28/2022.      Assessment   Marco is a 22 y.o. male referred to outpatient Physical Therapy with a medical diagnosis of S/P L shoulder posterior labrum repair. Pt presents with left shoulder stiffness from post-surgical state. Will benefit from increased flexibility and strength to perform ADL's at home.    Pt prognosis is Excellent.   Pt will benefit from skilled outpatient Physical Therapy to address the deficits stated above and in the chart below, provide pt/family education, and to maximize pt's level of independence.     Plan of care discussed with patient: Yes  Pt's spiritual, cultural and educational needs considered and patient is agreeable to the plan of care and goals as stated below:     Anticipated Barriers for therapy: age, " transportation, schooling.    Medical Necessity is demonstrated by the following  History  Co-morbidities and personal factors that may impact the plan of care Co-morbidities:   young age    Personal Factors:   age     moderate   Examination  Body Structures and Functions, activity limitations and participation restrictions that may impact the plan of care Body Regions:   upper extremities    Body Systems:    gross symmetry  ROM  strength    Participation Restrictions:   lifting    Activity limitations:   Learning and applying knowledge  no deficits    General Tasks and Commands  undertaking a single task    Communication  no deficits    Mobility  lifting and carrying objects    Self care  washing oneself (bathing, drying, washing hands)  caring for body parts (brushing teeth, shaving, grooming)  toileting  dressing  eating    Domestic Life  shopping  cooking  doing house work (cleaning house, washing dishes, laundry)    Interactions/Relationships  no deficits    Life Areas  school education    Community and Social Life  community life  recreation and leisure         high   Clinical Presentation evolving clinical presentation with changing clinical characteristics moderate   Decision Making/ Complexity Score: moderate       Goals:  Short Term Goals (6 Weeks):   1. Patient will be compliant with home exercise program to assist therapy in restoring pain free motion of the shoulder.   2. Patient will improve impaired shoulder manual muscle tests 1/2 grade bilaterally to improve strength for functional tasks.  3. Patient will improve L shoulder flexion to over 130 degrees to improve functional mobility of upper extremities.  4. Patient will improve L shoulder abduction to over 115 degrees to improve functional mobility of upper extremities.      Long Term Goals (12 Weeks):   1. Patient will improve FOTO score to </= 35% to demonstrate improvements in carrying, moving, and handling objects  2. Patient will improve  impaired shoulder manual muscle tests 1 grade bilaterally to improve strength for household duties.  3. Patient will improve L shoulder flexion to >/= 160 degrees to improve functional mobility of upper extremities.   4. Patient will improve L shoulder abduction to >/= 160 degrees to improve functional mobility of upper extremities.  5. Patient will perform full active range of motion of both shoulders with no deficits nor pain to improve functional activity        Plan   Plan of care Certification: 7/28/2022 to 10/28/2022.    Outpatient Physical Therapy 3 times weekly for 12 weeks to include the following interventions: Aquatic Therapy, Electrical Stimulation IFC, Manual Therapy, Moist Heat/ Ice, Neuromuscular Re-ed, Patient Education, Self Care, Therapeutic Activities and Therapeutic Exercise, ASTYM, Kinesiotaping PRN, Functional Dry Needling    Jayson Dowell, PT, DPT

## 2022-07-28 NOTE — Clinical Note
Efren Mcguire, My name is Jayson Dowell and I will be your point person for Marco until he switches back to [place]. Feel free to reach out if you have any questions for me. I will be using a posterior labrum repair protocol for him. He did well today and will see him back early next week. Thank you!

## 2022-07-29 NOTE — ANESTHESIA POST-OP PAIN MANAGEMENT
"Acute Pain Service Progress Note      7/29/2022 14:57    Patient contacted regarding [facility] infusion follow up. Reports that pain has been well controlled with the infusion pump. Denies signs of local anesthetic toxicity. PNC dressing remains clean, dry, and intact. All questions answered. Reminded patient that the infusion should be discontinued and PNC removed whenever the "infusion complete" alert is seen on the display screen or by POD 5 (7/30/22) at 12pm, whichever comes first. Encouraged patient to call the [facility] 24/7 support line at [phone] or the [facility] line at [phone] for any [facility] related questions/issues. Verbalizes understanding. Will continue to follow up.      "

## 2022-08-04 ENCOUNTER — CLINICAL SUPPORT (OUTPATIENT)
Dept: REHABILITATION | Facility: HOSPITAL | Age: 22
End: 2022-08-04
Payer: COMMERCIAL

## 2022-08-04 DIAGNOSIS — G89.11 ACUTE PAIN OF LEFT SHOULDER DUE TO TRAUMA: Primary | ICD-10-CM

## 2022-08-04 DIAGNOSIS — M25.512 ACUTE PAIN OF LEFT SHOULDER DUE TO TRAUMA: Primary | ICD-10-CM

## 2022-08-04 DIAGNOSIS — M25.612 DECREASED RANGE OF MOTION OF LEFT SHOULDER: ICD-10-CM

## 2022-08-04 PROCEDURE — 97110 THERAPEUTIC EXERCISES: CPT | Performed by: PHYSICAL THERAPIST

## 2022-08-04 PROCEDURE — 97140 MANUAL THERAPY 1/> REGIONS: CPT | Performed by: PHYSICAL THERAPIST

## 2022-08-04 NOTE — PROGRESS NOTES
"[facility]   Physical Therapy Treatment Note     Name: Marco Gould [facility] Number: [id number]    Therapy Diagnosis:   Encounter Diagnoses   Name Primary?    Acute pain of left shoulder due to trauma Yes    Decreased range of motion of left shoulder      Physician: Jaycee Mcguire MD    Visit Date: 8/4/2022    Physician Orders: PT Eval and Treat   Medical Diagnosis from Referral:   Z98.890 (ICD-10-CM) - S/P arthroscopy of left shoulder   S43.432A (ICD-10-CM) - Tear of left glenoid labrum, initial encounter      Evaluation Date: 7/28/2022  Authorization Period Expiration: 12/31/2022  Plan of Care Expiration: 10/28/2022  Visit # / Visits authorized: 4/20 (only 2 this episode)  FOTO: 1/3      PTA Visit #: 0/5     Time In: 350  Time Out: 435  Total Billable Time: 45 minutes    Precautions: Standard     Surgery:   Left shoulder posterior labrum repair on 7/26/2022 (1 week, 2 days)    SUBJECTIVE     Pt reports: feels he turned a corner, still a little stiff but not taking as much pain medicine at this time  .  He was compliant with home exercise program.  Response to previous treatment: soreness  Functional change: none yet    Pain: 4/10  Location: left shoulder      OBJECTIVE     Objective Measures updated at progress report unless specified.     TREATMENT     Marco received the treatments listed below:      Marco received therapeutic exercises to develop strength, endurance, ROM, flexibility, posture and core stabilization for 20 minutes including:    Seated and supine biceps curls 20x. No weight  Hand / wrist - extension and flexion 20x each  Hand  on brace - black gripper - 5" hold, 20x  Scapular retractions 3x10 seated  Gentle triceps pushdown on mat - 5" hold, 15x    Marco received the following manual therapy techniques: Joint mobilizations, Myofacial release and Soft tissue Mobilization were applied to the: left shoulder for 25 minutes, including:    Passive Range of motion " - flexion, abduction, external rotation  Long axis distraction of left shoulder   sidelying scapular mobilizations - grade I/II (left arm under 90 of flexion)    Marco participated in neuromuscular re-education activities to improve: Kinesthetic, Sense, Proprioception and Posture for 0 minutes. The following activities were included:      Marco participated in dynamic functional therapeutic activities to improve functional performance for 0  minutes, including:      Patient Education and Home Exercises     Home Exercises Provided and Patient Education Provided      Education provided:   - Brace education on donning and doffing  - Home modalities as needed  - HEP given and demonstrated  - No internal rotation at this time - reaching toward stomach or behind back     Written Home Exercises Provided: yes.  Exercises were reviewed and Marco was able to demonstrate them prior to the end of the session.  Marco demonstrated good  understanding of the education provided.      See EMR under Patient Instructions for exercises provided 7/28/2022.    ASSESSMENT     Marco tolerated treatment well with no increase in sharp pains overall. Pt able to improve left shoulder stiffness flexibility throughout session. Pt will see MD next week and PT following. Will bring surgical pictures next visit as well. Still no internal rotation at this time and educated to keep sling on to let tissues heal.     Marco Is progressing well towards his goals.   Pt prognosis is Excellent.     Pt will continue to benefit from skilled outpatient physical therapy to address the deficits listed in the problem list box on initial evaluation, provide pt/family education and to maximize pt's level of independence in the home and community environment.     Pt's spiritual, cultural and educational needs considered and pt agreeable to plan of care and goals.     Anticipated barriers to physical therapy: age, transportation, going back to school    Goals:  (Progressing)  Short Term Goals (6 Weeks):   1. Patient will be compliant with home exercise program to assist therapy in restoring pain free motion of the shoulder.   2. Patient will improve impaired shoulder manual muscle tests 1/2 grade bilaterally to improve strength for functional tasks.  3. Patient will improve L shoulder flexion to over 130 degrees to improve functional mobility of upper extremities.  4. Patient will improve L shoulder abduction to over 115 degrees to improve functional mobility of upper extremities.      Long Term Goals (12 Weeks):   1. Patient will improve FOTO score to </= 35% to demonstrate improvements in carrying, moving, and handling objects  2. Patient will improve impaired shoulder manual muscle tests 1 grade bilaterally to improve strength for household duties.  3. Patient will improve L shoulder flexion to >/= 160 degrees to improve functional mobility of upper extremities.   4. Patient will improve L shoulder abduction to >/= 160 degrees to improve functional mobility of upper extremities.  5. Patient will perform full active range of motion of both shoulders with no deficits nor pain to improve functional activity    PLAN     Continue with physical therapy as planned.     Plan of care Certification: 7/28/2022 to 10/28/2022.     Outpatient Physical Therapy 3 times weekly for 12 weeks to include the following interventions: Aquatic Therapy, Electrical Stimulation IFC, Manual Therapy, Moist Heat/ Ice, Neuromuscular Re-ed, Patient Education, Self Care, Therapeutic Activities and Therapeutic Exercise, ASTYM, Kinesiotaping PRN, Functional Dry Needling    Jayson Dowell, PT, DPT

## 2022-08-10 ENCOUNTER — OFFICE VISIT (OUTPATIENT)
Dept: SPORTS MEDICINE | Facility: CLINIC | Age: 22
End: 2022-08-10
Payer: COMMERCIAL

## 2022-08-10 VITALS
HEART RATE: 79 BPM | HEIGHT: 75 IN | BODY MASS INDEX: 21.51 KG/M2 | WEIGHT: 173 LBS | DIASTOLIC BLOOD PRESSURE: 66 MMHG | SYSTOLIC BLOOD PRESSURE: 110 MMHG

## 2022-08-10 DIAGNOSIS — Z98.890 S/P ARTHROSCOPY OF LEFT SHOULDER: Primary | ICD-10-CM

## 2022-08-10 DIAGNOSIS — S43.432A TEAR OF LEFT GLENOID LABRUM, INITIAL ENCOUNTER: ICD-10-CM

## 2022-08-10 PROCEDURE — 99024 POSTOP FOLLOW-UP VISIT: CPT | Mod: S$GLB,,, | Performed by: ORTHOPAEDIC SURGERY

## 2022-08-10 PROCEDURE — 3078F DIAST BP <80 MM HG: CPT | Mod: CPTII,S$GLB,, | Performed by: ORTHOPAEDIC SURGERY

## 2022-08-10 PROCEDURE — 3074F SYST BP LT 130 MM HG: CPT | Mod: CPTII,S$GLB,, | Performed by: ORTHOPAEDIC SURGERY

## 2022-08-10 PROCEDURE — 3074F PR MOST RECENT SYSTOLIC BLOOD PRESSURE < 130 MM HG: ICD-10-PCS | Mod: CPTII,S$GLB,, | Performed by: ORTHOPAEDIC SURGERY

## 2022-08-10 PROCEDURE — 3008F PR BODY MASS INDEX (BMI) DOCUMENTED: ICD-10-PCS | Mod: CPTII,S$GLB,, | Performed by: ORTHOPAEDIC SURGERY

## 2022-08-10 PROCEDURE — 3008F BODY MASS INDEX DOCD: CPT | Mod: CPTII,S$GLB,, | Performed by: ORTHOPAEDIC SURGERY

## 2022-08-10 PROCEDURE — 99024 PR POST-OP FOLLOW-UP VISIT: ICD-10-PCS | Mod: S$GLB,,, | Performed by: ORTHOPAEDIC SURGERY

## 2022-08-10 PROCEDURE — 1159F MED LIST DOCD IN RCRD: CPT | Mod: CPTII,S$GLB,, | Performed by: ORTHOPAEDIC SURGERY

## 2022-08-10 PROCEDURE — 3078F PR MOST RECENT DIASTOLIC BLOOD PRESSURE < 80 MM HG: ICD-10-PCS | Mod: CPTII,S$GLB,, | Performed by: ORTHOPAEDIC SURGERY

## 2022-08-10 PROCEDURE — 1159F PR MEDICATION LIST DOCUMENTED IN MEDICAL RECORD: ICD-10-PCS | Mod: CPTII,S$GLB,, | Performed by: ORTHOPAEDIC SURGERY

## 2022-08-10 PROCEDURE — 99999 PR PBB SHADOW E&M-EST. PATIENT-LVL III: ICD-10-PCS | Mod: PBBFAC,,, | Performed by: ORTHOPAEDIC SURGERY

## 2022-08-10 PROCEDURE — 99999 PR PBB SHADOW E&M-EST. PATIENT-LVL III: CPT | Mod: PBBFAC,,, | Performed by: ORTHOPAEDIC SURGERY

## 2022-08-10 NOTE — PROGRESS NOTES
"POST-OPERATIVE EXAMINATION    22 y.o. Male who returns for follow after surgery. He is 2 weeks s/p    PROCEDURES PERFORMED:  1. Left shoulder Arthroscopic posterior labral repair with capsulorrhaphy CPT - 24244     He is doing well without any issues.       PHYSICAL EXAMINATION:  /66   Pulse 79   Ht 6' 3" (1.905 m)   Wt 78.5 kg (173 lb)   BMI 21.62 kg/m²   General: Well-developed well-nourished 22 y.o. malein no acute distress   Cardiovascular: Regular rhythm   Lungs: No labored breathing or wheezing appreciated   Neuro: Alert and oriented ×3   Psychiatric: well oriented to person, place and time, demonstrates normal mood and affect   Skin: No rashes, lesions or ulcers, normal temperature, turgor, and texture on involved extremity    ORTHOPEDIC EXAM:  Normal post-operative swelling  Normal post-operative scarring  Strength: WNL  ROM: WNL  Tests: None    ASSESSMENT:      ICD-10-CM ICD-9-CM   1. S/P arthroscopy of left shoulder  Z98.890 V45.89   2. Tear of left glenoid labrum, initial encounter  S43.432A 840.8         PLAN:       1. Sutures removed today    2.  Continue physical therapy    3. RTC in 4 weeks            "

## 2022-08-12 ENCOUNTER — CLINICAL SUPPORT (OUTPATIENT)
Dept: REHABILITATION | Facility: HOSPITAL | Age: 22
End: 2022-08-12
Payer: COMMERCIAL

## 2022-08-12 DIAGNOSIS — G89.11 ACUTE PAIN OF LEFT SHOULDER DUE TO TRAUMA: ICD-10-CM

## 2022-08-12 DIAGNOSIS — M25.612 DECREASED RANGE OF MOTION OF LEFT SHOULDER: Primary | ICD-10-CM

## 2022-08-12 DIAGNOSIS — M25.512 ACUTE PAIN OF LEFT SHOULDER DUE TO TRAUMA: ICD-10-CM

## 2022-08-12 PROCEDURE — 97140 MANUAL THERAPY 1/> REGIONS: CPT | Performed by: PHYSICAL THERAPIST

## 2022-08-12 PROCEDURE — 97110 THERAPEUTIC EXERCISES: CPT | Performed by: PHYSICAL THERAPIST

## 2022-08-12 NOTE — PROGRESS NOTES
"[facility]   Physical Therapy Treatment Note     Name: Marco [facility] Number: [id number]    Therapy Diagnosis:   Encounter Diagnoses   Name Primary?    Decreased range of motion of left shoulder Yes    Acute pain of left shoulder due to trauma      Physician: Jaycee Mcguire MD    Visit Date: 8/12/2022    Physician Orders: PT Eval and Treat   Medical Diagnosis from Referral:   Z98.890 (ICD-10-CM) - S/P arthroscopy of left shoulder   S43.432A (ICD-10-CM) - Tear of left glenoid labrum, initial encounter      Evaluation Date: 7/28/2022  Authorization Period Expiration: 12/31/2022  Plan of Care Expiration: 10/28/2022  Visit # / Visits authorized: 5/20 (only 2 this episode)  FOTO: 1/3      PTA Visit #: 0/5     Time In: 1230  Time Out: 115  Total Billable Time: 45 minutes    Precautions: Standard     Surgery:   Left shoulder posterior labrum repair on 7/26/2022 (2 week, 3 days)    SUBJECTIVE     Pt reports: some stiffness overall, got stitches out 2 days ago. Continues to wear brace but may get brace off earlier.   .  He was compliant with home exercise program.  Response to previous treatment: soreness  Functional change: none yet    Pain: 4/10  Location: left shoulder      OBJECTIVE     Objective Measures updated at progress report unless specified.     TREATMENT     Marco received the treatments listed below:      Mraco received therapeutic exercises to develop strength, endurance, ROM, flexibility, posture and core stabilization for 20 minutes including:    Standing bicep curls 2# 3x12  Hand / wrist - extension and flexion 20x each  Hand  on brace - black gripper - 5" hold, 20x  Scapular retractions 3x10 seated  Gentle triceps pushdown on pulleys - 2 plates: 25x  Shoulder isometrics: 10" hold, 10x - flexion, IR, ER, ABD (HEP given)      Marco received the following manual therapy techniques: Joint mobilizations, Myofacial release and Soft tissue Mobilization were applied " to the: left shoulder for 25 minutes, including:    Passive Range of motion - flexion, abduction, external rotation  Long axis distraction of left shoulder   sidelying scapular mobilizations - grade I/II (left arm under 90 of flexion)    Marco participated in neuromuscular re-education activities to improve: Kinesthetic, Sense, Proprioception and Posture for 0 minutes. The following activities were included:      Marco participated in dynamic functional therapeutic activities to improve functional performance for 0  minutes, including:      Patient Education and Home Exercises     Home Exercises Provided and Patient Education Provided      Education provided:   - Brace education on donning and doffing  - Home modalities as needed  - HEP given and demonstrated  - No internal rotation at this time - reaching toward stomach or behind back     Written Home Exercises Provided: yes.  Exercises were reviewed and Marco was able to demonstrate them prior to the end of the session.  Marco demonstrated good  understanding of the education provided.      See EMR under Patient Instructions for exercises provided 7/28/2022.    ASSESSMENT     Marco continues to progress through protocol without significant pain. Pt will continues to improve with flexibility in all directions except IR which has not begun passive ROM yet. Pt added isometrics today without pain as well and given HEP to start working on strength into next week.     Marco Is progressing well towards his goals.   Pt prognosis is Excellent.     Pt will continue to benefit from skilled outpatient physical therapy to address the deficits listed in the problem list box on initial evaluation, provide pt/family education and to maximize pt's level of independence in the home and community environment.     Pt's spiritual, cultural and educational needs considered and pt agreeable to plan of care and goals.     Anticipated barriers to physical therapy: age, transportation,  going back to school    Goals: (Progressing)    Short Term Goals (6 Weeks):   1. Patient will be compliant with home exercise program to assist therapy in restoring pain free motion of the shoulder.   2. Patient will improve impaired shoulder manual muscle tests 1/2 grade bilaterally to improve strength for functional tasks.  3. Patient will improve L shoulder flexion to over 130 degrees to improve functional mobility of upper extremities.  4. Patient will improve L shoulder abduction to over 115 degrees to improve functional mobility of upper extremities.      Long Term Goals (12 Weeks):   1. Patient will improve FOTO score to </= 35% to demonstrate improvements in carrying, moving, and handling objects  2. Patient will improve impaired shoulder manual muscle tests 1 grade bilaterally to improve strength for household duties.  3. Patient will improve L shoulder flexion to >/= 160 degrees to improve functional mobility of upper extremities.   4. Patient will improve L shoulder abduction to >/= 160 degrees to improve functional mobility of upper extremities.  5. Patient will perform full active range of motion of both shoulders with no deficits nor pain to improve functional activity    PLAN     Continue with physical therapy as planned.     Plan of care Certification: 7/28/2022 to 10/28/2022.     Outpatient Physical Therapy 3 times weekly for 12 weeks to include the following interventions: Aquatic Therapy, Electrical Stimulation IFC, Manual Therapy, Moist Heat/ Ice, Neuromuscular Re-ed, Patient Education, Self Care, Therapeutic Activities and Therapeutic Exercise, ASTYM, Kinesiotaping PRN, Functional Dry Needling    Jayson Dowell, PT, DPT

## 2022-08-18 ENCOUNTER — DOCUMENTATION ONLY (OUTPATIENT)
Dept: REHABILITATION | Facility: HOSPITAL | Age: 22
End: 2022-08-18
Payer: COMMERCIAL

## 2022-08-18 NOTE — PROGRESS NOTES
Marco did not present for his PT appointment scheduled 8/17/22. I called the number listed on his chart and left a voicemail to contact the clinic by phone or myself by phone or through epic to schedule appointments.     Primitivo Greco, PT, DPT, SCS

## 2022-08-22 PROBLEM — G89.11 ACUTE PAIN OF LEFT SHOULDER DUE TO TRAUMA: Status: RESOLVED | Noted: 2022-05-19 | Resolved: 2022-08-22

## 2022-08-22 PROBLEM — M25.512 ACUTE PAIN OF LEFT SHOULDER DUE TO TRAUMA: Status: RESOLVED | Noted: 2022-05-19 | Resolved: 2022-08-22

## 2022-08-29 ENCOUNTER — DOCUMENTATION ONLY (OUTPATIENT)
Dept: REHABILITATION | Facility: HOSPITAL | Age: 22
End: 2022-08-29
Payer: COMMERCIAL

## 2022-08-29 NOTE — PROGRESS NOTES
I called the patient again today 8/29/22 at 09:30 regarding scheduling of PT and left a voicemail. I provided the clinic phone number as well as reminded him that he could reach out through the patient portal.     Primitivo Greco, PT, DPT, SCS

## 2022-09-09 ENCOUNTER — OFFICE VISIT (OUTPATIENT)
Dept: SPORTS MEDICINE | Facility: CLINIC | Age: 22
End: 2022-09-09
Payer: COMMERCIAL

## 2022-09-09 VITALS
WEIGHT: 173 LBS | SYSTOLIC BLOOD PRESSURE: 117 MMHG | HEIGHT: 75 IN | DIASTOLIC BLOOD PRESSURE: 75 MMHG | BODY MASS INDEX: 21.51 KG/M2

## 2022-09-09 DIAGNOSIS — S43.432A TEAR OF LEFT GLENOID LABRUM, INITIAL ENCOUNTER: ICD-10-CM

## 2022-09-09 DIAGNOSIS — Z98.890 S/P ARTHROSCOPY OF LEFT SHOULDER: Primary | ICD-10-CM

## 2022-09-09 PROCEDURE — 1159F PR MEDICATION LIST DOCUMENTED IN MEDICAL RECORD: ICD-10-PCS | Mod: CPTII,S$GLB,, | Performed by: ORTHOPAEDIC SURGERY

## 2022-09-09 PROCEDURE — 3078F DIAST BP <80 MM HG: CPT | Mod: CPTII,S$GLB,, | Performed by: ORTHOPAEDIC SURGERY

## 2022-09-09 PROCEDURE — 3074F SYST BP LT 130 MM HG: CPT | Mod: CPTII,S$GLB,, | Performed by: ORTHOPAEDIC SURGERY

## 2022-09-09 PROCEDURE — 3078F PR MOST RECENT DIASTOLIC BLOOD PRESSURE < 80 MM HG: ICD-10-PCS | Mod: CPTII,S$GLB,, | Performed by: ORTHOPAEDIC SURGERY

## 2022-09-09 PROCEDURE — 1159F MED LIST DOCD IN RCRD: CPT | Mod: CPTII,S$GLB,, | Performed by: ORTHOPAEDIC SURGERY

## 2022-09-09 PROCEDURE — 99999 PR PBB SHADOW E&M-EST. PATIENT-LVL III: ICD-10-PCS | Mod: PBBFAC,,, | Performed by: ORTHOPAEDIC SURGERY

## 2022-09-09 PROCEDURE — 3008F PR BODY MASS INDEX (BMI) DOCUMENTED: ICD-10-PCS | Mod: CPTII,S$GLB,, | Performed by: ORTHOPAEDIC SURGERY

## 2022-09-09 PROCEDURE — 3074F PR MOST RECENT SYSTOLIC BLOOD PRESSURE < 130 MM HG: ICD-10-PCS | Mod: CPTII,S$GLB,, | Performed by: ORTHOPAEDIC SURGERY

## 2022-09-09 PROCEDURE — 99999 PR PBB SHADOW E&M-EST. PATIENT-LVL III: CPT | Mod: PBBFAC,,, | Performed by: ORTHOPAEDIC SURGERY

## 2022-09-09 PROCEDURE — 99024 POSTOP FOLLOW-UP VISIT: CPT | Mod: S$GLB,,, | Performed by: ORTHOPAEDIC SURGERY

## 2022-09-09 PROCEDURE — 3008F BODY MASS INDEX DOCD: CPT | Mod: CPTII,S$GLB,, | Performed by: ORTHOPAEDIC SURGERY

## 2022-09-09 PROCEDURE — 99024 PR POST-OP FOLLOW-UP VISIT: ICD-10-PCS | Mod: S$GLB,,, | Performed by: ORTHOPAEDIC SURGERY

## 2022-09-09 NOTE — PROGRESS NOTES
"POST-OPERATIVE EXAMINATION    22 y.o. Male who returns for follow after surgery. He is 6 weeks s/p    PROCEDURES PERFORMED:  1. Left shoulder Arthroscopic posterior labral repair with capsulorrhaphy CPT - 56203     He is doing well without any issues.     PHYSICAL EXAMINATION:  /75   Ht 6' 3" (1.905 m)   Wt 78.5 kg (173 lb)   BMI 21.62 kg/m²   General: Well-developed well-nourished 22 y.o. malein no acute distress   Cardiovascular: Regular rhythm   Lungs: No labored breathing or wheezing appreciated   Neuro: Alert and oriented ×3   Psychiatric: well oriented to person, place and time, demonstrates normal mood and affect   Skin: No rashes, lesions or ulcers, normal temperature, turgor, and texture on involved extremity    ORTHOPEDIC EXAM:  Normal post-operative swelling  Normal post-operative scarring  Strength: WNL  ROM: PROM:     FE - 140°    Abd/ER -  30°  Abd/IR -  15°   Add/ER -  45°  Tests: None    ASSESSMENT:      ICD-10-CM ICD-9-CM   1. S/P arthroscopy of left shoulder  Z98.890 V45.89   2. Tear of left glenoid labrum, initial encounter  S43.432A 840.8         PLAN:       Progressing as expected with ROM, pain, and function  Recommend he resume formal PT.  Will refer him to a facility that is closer to [place] in the [address].  Discussed allowing him to return to shooting basketballs and dribbling  RTC in 1 month for repeat evaluation and ROM check              "

## 2022-09-13 ENCOUNTER — OFFICE VISIT (OUTPATIENT)
Dept: PRIMARY CARE CLINIC | Facility: CLINIC | Age: 22
End: 2022-09-13
Payer: COMMERCIAL

## 2022-09-13 VITALS
DIASTOLIC BLOOD PRESSURE: 56 MMHG | RESPIRATION RATE: 18 BRPM | HEIGHT: 75 IN | SYSTOLIC BLOOD PRESSURE: 112 MMHG | HEART RATE: 74 BPM | OXYGEN SATURATION: 97 % | BODY MASS INDEX: 21.92 KG/M2 | WEIGHT: 176.25 LBS

## 2022-09-13 DIAGNOSIS — F32.A ANXIETY AND DEPRESSION: ICD-10-CM

## 2022-09-13 DIAGNOSIS — F41.9 ANXIETY AND DEPRESSION: ICD-10-CM

## 2022-09-13 DIAGNOSIS — S69.91XS THUMB INJURY, RIGHT, SEQUELA: ICD-10-CM

## 2022-09-13 DIAGNOSIS — Z13.220 SCREENING FOR HYPERLIPIDEMIA: ICD-10-CM

## 2022-09-13 DIAGNOSIS — Z11.59 NEED FOR HEPATITIS C SCREENING TEST: ICD-10-CM

## 2022-09-13 DIAGNOSIS — Z11.4 SCREENING FOR HIV (HUMAN IMMUNODEFICIENCY VIRUS): Primary | ICD-10-CM

## 2022-09-13 DIAGNOSIS — Z23 ENCOUNTER FOR VACCINATION: ICD-10-CM

## 2022-09-13 PROCEDURE — 1160F RVW MEDS BY RX/DR IN RCRD: CPT | Mod: CPTII,S$GLB,, | Performed by: INTERNAL MEDICINE

## 2022-09-13 PROCEDURE — 90471 IMMUNIZATION ADMIN: CPT | Mod: S$GLB,,, | Performed by: INTERNAL MEDICINE

## 2022-09-13 PROCEDURE — 90686 IIV4 VACC NO PRSV 0.5 ML IM: CPT | Mod: S$GLB,,, | Performed by: INTERNAL MEDICINE

## 2022-09-13 PROCEDURE — 99213 PR OFFICE/OUTPT VISIT, EST, LEVL III, 20-29 MIN: ICD-10-PCS | Mod: 25,S$GLB,, | Performed by: INTERNAL MEDICINE

## 2022-09-13 PROCEDURE — 1159F PR MEDICATION LIST DOCUMENTED IN MEDICAL RECORD: ICD-10-PCS | Mod: CPTII,S$GLB,, | Performed by: INTERNAL MEDICINE

## 2022-09-13 PROCEDURE — 1160F PR REVIEW ALL MEDS BY PRESCRIBER/CLIN PHARMACIST DOCUMENTED: ICD-10-PCS | Mod: CPTII,S$GLB,, | Performed by: INTERNAL MEDICINE

## 2022-09-13 PROCEDURE — 3008F BODY MASS INDEX DOCD: CPT | Mod: CPTII,S$GLB,, | Performed by: INTERNAL MEDICINE

## 2022-09-13 PROCEDURE — 3078F PR MOST RECENT DIASTOLIC BLOOD PRESSURE < 80 MM HG: ICD-10-PCS | Mod: CPTII,S$GLB,, | Performed by: INTERNAL MEDICINE

## 2022-09-13 PROCEDURE — 3008F PR BODY MASS INDEX (BMI) DOCUMENTED: ICD-10-PCS | Mod: CPTII,S$GLB,, | Performed by: INTERNAL MEDICINE

## 2022-09-13 PROCEDURE — 99213 OFFICE O/P EST LOW 20 MIN: CPT | Mod: 25,S$GLB,, | Performed by: INTERNAL MEDICINE

## 2022-09-13 PROCEDURE — 90471 FLU VACCINE (QUAD) GREATER THAN OR EQUAL TO 3YO PRESERVATIVE FREE IM: ICD-10-PCS | Mod: S$GLB,,, | Performed by: INTERNAL MEDICINE

## 2022-09-13 PROCEDURE — 3074F PR MOST RECENT SYSTOLIC BLOOD PRESSURE < 130 MM HG: ICD-10-PCS | Mod: CPTII,S$GLB,, | Performed by: INTERNAL MEDICINE

## 2022-09-13 PROCEDURE — 99999 PR PBB SHADOW E&M-EST. PATIENT-LVL IV: ICD-10-PCS | Mod: PBBFAC,,, | Performed by: INTERNAL MEDICINE

## 2022-09-13 PROCEDURE — 3074F SYST BP LT 130 MM HG: CPT | Mod: CPTII,S$GLB,, | Performed by: INTERNAL MEDICINE

## 2022-09-13 PROCEDURE — 1159F MED LIST DOCD IN RCRD: CPT | Mod: CPTII,S$GLB,, | Performed by: INTERNAL MEDICINE

## 2022-09-13 PROCEDURE — 90686 FLU VACCINE (QUAD) GREATER THAN OR EQUAL TO 3YO PRESERVATIVE FREE IM: ICD-10-PCS | Mod: S$GLB,,, | Performed by: INTERNAL MEDICINE

## 2022-09-13 PROCEDURE — 99999 PR PBB SHADOW E&M-EST. PATIENT-LVL IV: CPT | Mod: PBBFAC,,, | Performed by: INTERNAL MEDICINE

## 2022-09-13 PROCEDURE — 3078F DIAST BP <80 MM HG: CPT | Mod: CPTII,S$GLB,, | Performed by: INTERNAL MEDICINE

## 2022-09-13 RX ORDER — HYDROXYZINE HYDROCHLORIDE 25 MG/1
25 TABLET, FILM COATED ORAL 2 TIMES DAILY PRN
Qty: 20 TABLET | Refills: 1 | Status: SHIPPED | OUTPATIENT
Start: 2022-09-13 | End: 2022-11-18

## 2022-09-13 NOTE — PROGRESS NOTES
Subjective:       Patient ID: Marco Vallecillo is a 22 y.o. male.    Chief Complaint: Wrist Injury (Right writs injured playing basketball. Was seen in [facility] ER last night. X-Ray of the hand, wrist and arm show no fracture. They let him know to repeat X-Ray in 7 days to double check ) and Anxiety    HPI  Pt visit today for routine f/u he report h/o anxiety for several yrs since 17 yo took SSRI make stomach pain so stop Pt ha sbeen more isolate not making any new friend he used to see psych before but the stop coming . He also c/o felt yesterday while playing basket ball landed on rt thumb now in thumb cast  good cappilary refills no other injury  Review of Systems   Constitutional:  Negative for unexpected weight change.   HENT:  Negative for congestion.    Respiratory:  Negative for shortness of breath.    Cardiovascular:  Negative for chest pain.   Gastrointestinal:  Negative for abdominal pain.   Musculoskeletal:  Positive for arthralgias.   Psychiatric/Behavioral:  Positive for decreased concentration.      Objective:      Physical Exam  Vitals and nursing note reviewed.   Constitutional:       General: He is not in acute distress.     Appearance: He is well-developed.   HENT:      Head: Normocephalic and atraumatic.      Right Ear: External ear normal.      Left Ear: External ear normal.      Nose: Nose normal.      Mouth/Throat:      Pharynx: No oropharyngeal exudate.   Eyes:      General:         Right eye: No discharge.         Left eye: No discharge.      Conjunctiva/sclera: Conjunctivae normal.      Pupils: Pupils are equal, round, and reactive to light.   Neck:      Thyroid: No thyromegaly.   Cardiovascular:      Rate and Rhythm: Normal rate and regular rhythm.      Heart sounds: Normal heart sounds. No murmur heard.    No friction rub. No gallop.   Pulmonary:      Effort: Pulmonary effort is normal. No respiratory distress.      Breath sounds: Normal breath sounds. No wheezing or rales.   Chest:       Chest wall: No tenderness.   Abdominal:      General: Bowel sounds are normal. There is no distension.      Palpations: Abdomen is soft.      Tenderness: There is no abdominal tenderness.   Musculoskeletal:         General: No tenderness or deformity. Normal range of motion.      Cervical back: Normal range of motion and neck supple.   Lymphadenopathy:      Cervical: No cervical adenopathy.   Skin:     General: Skin is warm and dry.      Findings: No erythema or rash.   Neurological:      Mental Status: He is alert and oriented to person, place, and time.   Psychiatric:         Mood and Affect: Mood normal.         Thought Content: Thought content normal.         Judgment: Judgment normal.       Assessment:       1. Screening for HIV (human immunodeficiency virus)    2. Need for hepatitis C screening test    3. Screening for hyperlipidemia    4. Encounter for vaccination    5. Anxiety and depression    6. Thumb injury, right, sequela          Plan:       Screening for HIV (human immunodeficiency virus)  -     HIV 1/2 Ag/Ab (4th Gen); Future; Expected date: 09/13/2022    Need for hepatitis C screening test  -     Hepatitis C Antibody; Future; Expected date: 09/13/2022    Screening for hyperlipidemia  -     Lipid Panel; Future; Expected date: 09/13/2022    Encounter for vaccination  -     Influenza - Quadrivalent *Preferred* (6 months+) (PF)    Anxiety and depression  -     CBC Auto Differential; Future; Expected date: 09/13/2022  -     Comprehensive Metabolic Panel; Future; Expected date: 09/13/2022  -     TSH; Future; Expected date: 09/13/2022  -     T4, Free; Future; Expected date: 09/13/2022  -     hydrOXYzine HCL (ATARAX) 25 MG tablet; Take 1 tablet (25 mg total) by mouth 2 (two) times daily as needed for Anxiety.  Dispense: 20 tablet; Refill: 1  -     Ambulatory referral/consult to Psychiatry; Future; Expected date: 09/20/2022    Thumb injury, right, sequela  -     Ambulatory referral/consult to Orthopedics;  Future; Expected date: 09/20/2022      Medication List with Changes/Refills   New Medications    HYDROXYZINE HCL (ATARAX) 25 MG TABLET    Take 1 tablet (25 mg total) by mouth 2 (two) times daily as needed for Anxiety.   Current Medications    ALBUTEROL (PROVENTIL/VENTOLIN HFA) 90 MCG/ACTUATION INHALER    Inhale 2 puffs into the lungs every 6 (six) hours as needed.    HYDROCODONE-ACETAMINOPHEN (NORCO) 7.5-325 MG PER TABLET    Take 1 tablet by mouth every 4 (four) hours as needed for Pain.    MUPIROCIN (BACTROBAN) 2 % OINTMENT    1 application    TRAMADOL (ULTRAM) 50 MG TABLET    Take 1 tablet (50 mg total) by mouth every 6 (six) hours as needed for Pain.

## 2022-09-20 ENCOUNTER — PATIENT MESSAGE (OUTPATIENT)
Dept: ORTHOPEDICS | Facility: CLINIC | Age: 22
End: 2022-09-20
Payer: COMMERCIAL

## 2022-09-23 ENCOUNTER — OFFICE VISIT (OUTPATIENT)
Dept: ORTHOPEDICS | Facility: CLINIC | Age: 22
End: 2022-09-23
Payer: COMMERCIAL

## 2022-09-23 ENCOUNTER — HOSPITAL ENCOUNTER (OUTPATIENT)
Dept: RADIOLOGY | Facility: HOSPITAL | Age: 22
Discharge: HOME | End: 2022-09-23
Attending: PHYSICIAN ASSISTANT
Payer: COMMERCIAL

## 2022-09-23 ENCOUNTER — TELEPHONE (OUTPATIENT)
Dept: ORTHOPEDICS | Facility: CLINIC | Age: 22
End: 2022-09-23

## 2022-09-23 VITALS
DIASTOLIC BLOOD PRESSURE: 65 MMHG | BODY MASS INDEX: 22.06 KG/M2 | WEIGHT: 177.38 LBS | HEART RATE: 71 BPM | SYSTOLIC BLOOD PRESSURE: 122 MMHG | HEIGHT: 75 IN

## 2022-09-23 DIAGNOSIS — S69.91XS THUMB INJURY, RIGHT, SEQUELA: ICD-10-CM

## 2022-09-23 DIAGNOSIS — M25.562 ACUTE PAIN OF LEFT KNEE: Primary | ICD-10-CM

## 2022-09-23 DIAGNOSIS — M25.562 ACUTE PAIN OF LEFT KNEE: ICD-10-CM

## 2022-09-23 PROCEDURE — 99999 PR PBB SHADOW E&M-EST. PATIENT-LVL V: CPT | Mod: PBBFAC,,, | Performed by: PHYSICIAN ASSISTANT

## 2022-09-23 PROCEDURE — 73560 X-RAY EXAM OF KNEE 1 OR 2: CPT | Mod: 26,RT,, | Performed by: RADIOLOGY

## 2022-09-23 PROCEDURE — 73130 XR HAND COMPLETE 3 VIEW RIGHT: ICD-10-PCS | Mod: 26,RT,, | Performed by: RADIOLOGY

## 2022-09-23 PROCEDURE — 3074F SYST BP LT 130 MM HG: CPT | Mod: CPTII,S$GLB,, | Performed by: PHYSICIAN ASSISTANT

## 2022-09-23 PROCEDURE — 73130 X-RAY EXAM OF HAND: CPT | Mod: 26,RT,, | Performed by: RADIOLOGY

## 2022-09-23 PROCEDURE — 1159F MED LIST DOCD IN RCRD: CPT | Mod: CPTII,S$GLB,, | Performed by: PHYSICIAN ASSISTANT

## 2022-09-23 PROCEDURE — 99213 PR OFFICE/OUTPT VISIT, EST, LEVL III, 20-29 MIN: ICD-10-PCS | Mod: 24,S$GLB,, | Performed by: PHYSICIAN ASSISTANT

## 2022-09-23 PROCEDURE — 3008F PR BODY MASS INDEX (BMI) DOCUMENTED: ICD-10-PCS | Mod: CPTII,S$GLB,, | Performed by: PHYSICIAN ASSISTANT

## 2022-09-23 PROCEDURE — 1159F PR MEDICATION LIST DOCUMENTED IN MEDICAL RECORD: ICD-10-PCS | Mod: CPTII,S$GLB,, | Performed by: PHYSICIAN ASSISTANT

## 2022-09-23 PROCEDURE — 3078F PR MOST RECENT DIASTOLIC BLOOD PRESSURE < 80 MM HG: ICD-10-PCS | Mod: CPTII,S$GLB,, | Performed by: PHYSICIAN ASSISTANT

## 2022-09-23 PROCEDURE — 73560 X-RAY EXAM OF KNEE 1 OR 2: CPT | Mod: TC,59,RT

## 2022-09-23 PROCEDURE — 73562 XR KNEE ORTHO LEFT: ICD-10-PCS | Mod: 26,LT,, | Performed by: RADIOLOGY

## 2022-09-23 PROCEDURE — 73560 XR KNEE ORTHO LEFT: ICD-10-PCS | Mod: 26,RT,, | Performed by: RADIOLOGY

## 2022-09-23 PROCEDURE — 99999 PR PBB SHADOW E&M-EST. PATIENT-LVL V: ICD-10-PCS | Mod: PBBFAC,,, | Performed by: PHYSICIAN ASSISTANT

## 2022-09-23 PROCEDURE — 3074F PR MOST RECENT SYSTOLIC BLOOD PRESSURE < 130 MM HG: ICD-10-PCS | Mod: CPTII,S$GLB,, | Performed by: PHYSICIAN ASSISTANT

## 2022-09-23 PROCEDURE — 1160F RVW MEDS BY RX/DR IN RCRD: CPT | Mod: CPTII,S$GLB,, | Performed by: PHYSICIAN ASSISTANT

## 2022-09-23 PROCEDURE — 1160F PR REVIEW ALL MEDS BY PRESCRIBER/CLIN PHARMACIST DOCUMENTED: ICD-10-PCS | Mod: CPTII,S$GLB,, | Performed by: PHYSICIAN ASSISTANT

## 2022-09-23 PROCEDURE — 73562 X-RAY EXAM OF KNEE 3: CPT | Mod: 26,LT,, | Performed by: RADIOLOGY

## 2022-09-23 PROCEDURE — 99213 OFFICE O/P EST LOW 20 MIN: CPT | Mod: 24,S$GLB,, | Performed by: PHYSICIAN ASSISTANT

## 2022-09-23 PROCEDURE — 3008F BODY MASS INDEX DOCD: CPT | Mod: CPTII,S$GLB,, | Performed by: PHYSICIAN ASSISTANT

## 2022-09-23 PROCEDURE — 3078F DIAST BP <80 MM HG: CPT | Mod: CPTII,S$GLB,, | Performed by: PHYSICIAN ASSISTANT

## 2022-09-23 PROCEDURE — 73130 X-RAY EXAM OF HAND: CPT | Mod: TC,RT

## 2022-09-23 RX ORDER — MELOXICAM 15 MG/1
15 TABLET ORAL DAILY
Qty: 30 TABLET | Refills: 1 | Status: SHIPPED | OUTPATIENT
Start: 2022-09-23 | End: 2022-09-23

## 2022-09-23 RX ORDER — MELOXICAM 15 MG/1
15 TABLET ORAL DAILY
Qty: 30 TABLET | Refills: 1 | Status: SHIPPED | OUTPATIENT
Start: 2022-09-23 | End: 2022-10-23

## 2022-09-23 NOTE — PROGRESS NOTES
"  SUBJECTIVE:     Chief Complaint & History of Present Illness:  Marco Vallecillo is a Established patient 22 y.o. male who is seen here today with a complaint of    Chief Complaint   Patient presents with    Left Knee - Pain, Injury    Right Hand - Pain, Injury    .  He has patient well-known to me was last seen treated the clinic by me 05/13/2022 for his shoulder pain he says since referred to sports and has undergone surgical repair he has done very well.  He had in fact return to playing basketball and suffered a fall last week striking his knee and hand.  States he is soreness and tenderness at the base of the wrist near the his thumb of the right hand and some soreness and suprapatellar lateral region of his left knee he is able to stand ambulate carry out most of his daily activities but is concerned he may have some damage to these areas and states that it does cause him pain when he attempts to play basketball  On a scale of 1-10, with 10 being worst pain imaginable, he rates this pain as 1 on good days and 3 on bad days.  he describes the pain as tender.    Review of patient's allergies indicates:   Allergen Reactions    Peanut      "Throat swells up"         Current Outpatient Medications   Medication Sig Dispense Refill    albuterol (PROVENTIL/VENTOLIN HFA) 90 mcg/actuation inhaler Inhale 2 puffs into the lungs every 6 (six) hours as needed.      HYDROcodone-acetaminophen (NORCO) 7.5-325 mg per tablet Take 1 tablet by mouth every 4 (four) hours as needed for Pain. 20 tablet 0    hydrOXYzine HCL (ATARAX) 25 MG tablet Take 1 tablet (25 mg total) by mouth 2 (two) times daily as needed for Anxiety. 20 tablet 1    mupirocin (BACTROBAN) 2 % ointment 1 application      traMADoL (ULTRAM) 50 mg tablet Take 1 tablet (50 mg total) by mouth every 6 (six) hours as needed for Pain. 12 tablet 0    meloxicam (MOBIC) 15 MG tablet Take 1 tablet (15 mg total) by mouth once daily. 30 tablet 1     No current " "facility-administered medications for this visit.       Past Medical History:   Diagnosis Date    Anxiety disorder, unspecified     Mild intermittent asthma, uncomplicated        Past Surgical History:   Procedure Laterality Date    ARTHROSCOPY, SHOULDER, WITH CAPSULORRHAPHY Left 7/26/2022    Procedure: ARTHROSCOPY,SHOULDER,WITH CAPSULORRHAPHY - [facility];  Surgeon: Jaycee Mcguire MD;  Location: [facility] OR;  Service: Orthopedics;  Laterality: Left;  regional block    REPAIR OF SUPERIOR LABRAL ANTERIOR-TO-POSTERIOR (SLAP) TEAR OF SHOULDER Left 7/26/2022    Procedure: REPAIR, SLAP LESION, SHOULDER;  Surgeon: Jaycee Mcguire MD;  Location: [facility] OR;  Service: Orthopedics;  Laterality: Left;  regional block    WISDOM TOOTH EXTRACTION         Vital Signs (Most Recent)  Vitals:    09/23/22 1045   BP: 122/65   Pulse: 71           Review of Systems:  ROS:  Constitutional: no fever or chills  Eyes: no visual changes  ENT: no nasal congestion or sore throat  Respiratory: no cough or shortness of breath  Cardiovascular: no chest pain or palpitations  Gastrointestinal: no nausea or vomiting, tolerating diet  Genitourinary: no hematuria or dysuria  Integument/Breast: no rash or pruritis  Hematologic/Lymphatic: no easy bruising or lymphadenopathy  Musculoskeletal: no arthralgias or myalgias  Neurological: no seizures or tremors  Behavioral/Psych: no auditory or visual hallucinations  Endocrine: no heat or cold intolerance              OBJECTIVE:     PHYSICAL EXAM:  Height: 6' 3" (190.5 cm) Weight: 80.4 kg (177 lb 5.8 oz), General Appearance: Well nourished, well developed, in no acute distress.  Neurological: Mood & affect are normal.    left  Knee Exam:  Knee Range of Motion:0-135 degrees flexion   Effusion:none  Condition of skin:intact  Location of tenderness:Patellar tendon   Strength:5 of 5 quadriceps strength and 5 of 5 gastroc-soleus strength  Stability:  Lachman: stable, LCL: stable, MCL: stable, PCL: stable, and " posteromedial (dial): stable  Varus /Valgus stress:  normal  Patricia:   negative/negative    right wrist and thumb   History of injury: direct trauma, fall  Pain: Description: mild and intermittent  Night pain: no  Rest pain: no  Quality: sharp  Location: wrist, dorsal, dorsal hand, and thumb  Exacerbating factors: activity, gripping, and lifting  Alleviating factors: ice, OTC NSAIDS, and rest  Neurological complaints: none  Mass/Swelling: none  Condition of skin:intact  Hand Exam: normal exam, no swelling, tenderness, instability; ligaments intact, full ROM both hands, wrists, and finger joints  ROM:fingers flex to palm and thumb flexes to palm    Wrist Exam: palmar flexion 90/90, dorsiflexion 90/90, pronation 90/90, supination 90/90, flexion contracture 0/0, extension contracture 0/0, radial deviation 25/25, ulnar deviation 25/25      RADIOGRAPHS:  X-rays the wrist and hand taken today films reviewed by me demonstrate no evidence of fracture dislocation joint spaces well maintained no advanced degenerative joint disease     X-rays the knees taken today films reviewed by me demonstrate no evidence of fracture dislocation joint spaces well maintained    ASSESSMENT/PLAN:       ICD-10-CM ICD-9-CM   1. Acute pain of left knee  M25.562 719.46   2. Thumb injury, right, sequela  S69.91XS 908.9       Plan:   Meloxicam 15 mg q.d. with food times 10 days followed by p.r.n.  Follow-up in 3 weeks if symptoms not significantly improved sooner symptoms dictate

## 2022-09-27 ENCOUNTER — PATIENT MESSAGE (OUTPATIENT)
Dept: PRIMARY CARE CLINIC | Facility: CLINIC | Age: 22
End: 2022-09-27
Payer: COMMERCIAL

## 2022-11-18 ENCOUNTER — OFFICE VISIT (OUTPATIENT)
Dept: PSYCHIATRY | Facility: CLINIC | Age: 22
End: 2022-11-18
Payer: COMMERCIAL

## 2022-11-18 DIAGNOSIS — F40.10 SOCIAL ANXIETY DISORDER: ICD-10-CM

## 2022-11-18 DIAGNOSIS — F41.0 GENERALIZED ANXIETY DISORDER WITH PANIC ATTACKS: Primary | ICD-10-CM

## 2022-11-18 DIAGNOSIS — F32.A DEPRESSION, UNSPECIFIED DEPRESSION TYPE: ICD-10-CM

## 2022-11-18 DIAGNOSIS — F41.1 GENERALIZED ANXIETY DISORDER WITH PANIC ATTACKS: Primary | ICD-10-CM

## 2022-11-18 DIAGNOSIS — F12.10 MARIJUANA ABUSE: ICD-10-CM

## 2022-11-18 PROCEDURE — 1159F MED LIST DOCD IN RCRD: CPT | Mod: CPTII,95,S$GLB, | Performed by: NURSE PRACTITIONER

## 2022-11-18 PROCEDURE — 99999 PR PBB SHADOW E&M-EST. PATIENT-LVL II: CPT | Mod: PBBFAC,,, | Performed by: NURSE PRACTITIONER

## 2022-11-18 PROCEDURE — 90792 PSYCH DIAG EVAL W/MED SRVCS: CPT | Mod: 95,S$GLB,, | Performed by: NURSE PRACTITIONER

## 2022-11-18 PROCEDURE — 1160F PR REVIEW ALL MEDS BY PRESCRIBER/CLIN PHARMACIST DOCUMENTED: ICD-10-PCS | Mod: CPTII,95,S$GLB, | Performed by: NURSE PRACTITIONER

## 2022-11-18 PROCEDURE — 1160F RVW MEDS BY RX/DR IN RCRD: CPT | Mod: CPTII,95,S$GLB, | Performed by: NURSE PRACTITIONER

## 2022-11-18 PROCEDURE — 99999 PR PBB SHADOW E&M-EST. PATIENT-LVL II: ICD-10-PCS | Mod: PBBFAC,,, | Performed by: NURSE PRACTITIONER

## 2022-11-18 PROCEDURE — 90792 PR PSYCHIATRIC DIAGNOSTIC EVALUATION W/MEDICAL SERVICES: ICD-10-PCS | Mod: 95,S$GLB,, | Performed by: NURSE PRACTITIONER

## 2022-11-18 PROCEDURE — 1159F PR MEDICATION LIST DOCUMENTED IN MEDICAL RECORD: ICD-10-PCS | Mod: CPTII,95,S$GLB, | Performed by: NURSE PRACTITIONER

## 2022-11-18 RX ORDER — BUSPIRONE HYDROCHLORIDE 5 MG/1
TABLET ORAL
Qty: 165 TABLET | Refills: 0 | Status: SHIPPED | OUTPATIENT
Start: 2022-11-18 | End: 2023-06-28 | Stop reason: ALTCHOICE

## 2022-11-18 NOTE — PROGRESS NOTES
"Outpatient Psychiatry Initial Visit (MD/NP) - Telemedicine Visit      11/18/2022   Marco Vallecillo  2000  [id number]    The patient location is: Patient reported that their location at the time of this visit was in the [facility]     Visit type: audiovisual    Each patient to whom he or she provides medical services by telemedicine is:  (1) informed of the relationship between the physician and patient and the respective role of any other health care provider with respect to management of the patient; and (2) notified that he or she may decline to receive medical services by telemedicine and may withdraw from such care at any time.        Marco Vallecillo, a 22 y.o. male, presenting for initial evaluation visit. Met with patient.    Reason for Encounter: self-referral. Patient complains of anxiety.        History of Present Illness:       Pt is here for evaluation of anxiety. He endorses anxiety including nervousness, teeth grinding at night, inability to relax, and restlessness. These sxs often occur in social situations. States that he has a small [place] of "old friends" that he sees about once a month. He feels like he is too anxious to make new friends. He also has panic attacks once every few months. During these periods he experiences heightened anxiety, racing heartbeat, vertigo and feeling like he is unable to breathe. These episodes last for about 5-10 minutes. Onset of symptoms was approximately age 17 when he started high school.     Pt also endorses episodes of depressed mood, anhedonia, decreased energy and motivation, low self-esteem, sleep disturbance, poor concentration, fatigue, and hopelessness. However, he denies current sxs of depression. States his mood is "alright. I'm in an ok mood. The depression is rare".  It occurs about once every several months and lasts for about 2 weeks.     He smokes marijuana every night to help him sleep. Also smokes to help him in social situations. " Has been smoking since age 17.   Denies AVH, delusions, paranoia, or sxs of kalpana or hypomania.    Lives with a roommate. Pt is a  for [employer]. 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    Was hospitalized at [facility] in  when he was 18 yo for suicidal ideation. States he took mushrooms with his friends, and this heightened his depression. States he no longer uses psychedelic drugs. Denies any suicide attempts.    Pt denies recurrent thoughts of death and denies any suicidal or homicidal plans or intentions.      No objective s/sx of psychosis or kalpana.     Discussed risks, benefits and SE profile of medication options. He states he has tried 4 different ADs and they caused sexual dysfunction and nausea. He does not want to try another AD. Discussed a trial of Buspar.    Discussed scheduling an appt for psychotherapy.        Risk Parameters:  Patient reports no suicidal ideation  Patient reports no homicidal ideation  Patient reports no self-injurious behavior  Patient reports no violent behavior      Psychotropic medication review  Previous Trials-  Effexor, some SSRIs & possibly Cymbatla - sexual side effects and nausea    Current meds-  None        Stressors:    Educational concerns and Occupational concerns      History:       Past Medical, Psychiatric, Family and Social History: The patient's past medical, psychiatric, family and social history, allergies, current medications, past surgical history, and problem list have been reviewed and updated as appropriate within the electronic medical record.        Additional historical information includes:   Seizure: denies  Head trauma/TBI: denies  Access to a firearm: yes -  Pt was instructed to keep the firearm in a safe, locked container and to store the bullets separately.    No current outpatient medications on file.     No current facility-administered medications for this visit.           Review Of Systems:       Review of Systems    Constitutional:  Negative for chills, fever and malaise/fatigue.   Respiratory:  Negative for cough and shortness of breath.    Cardiovascular:  Negative for chest pain and palpitations.   Gastrointestinal:  Negative for abdominal pain, diarrhea and vomiting.   Genitourinary:  Negative for dysuria and hematuria.   Musculoskeletal:  Negative for falls and myalgias.   Skin:  Negative for rash.   Neurological:  Negative for tremors, seizures and headaches.   Psychiatric/Behavioral:          See HPI        Current Evaluation:       Constitutional  Vitals:  Most recent vital signs, dated less than 90 days prior to this appointment, were reviewed.    There were no vitals filed for this visit.     General:  unremarkable, age appropriate, normal weight, well nourished, casually dressed, neatly groomed       Musculoskeletal  Muscle Strength/Tone:  not examined - RICO due to virtual visit   Gait & Station:  RICO due to virtual visit      Relevant Elements of Neurological Exam: RICO due to virtual visit      Mental Status Evaluation:  Appearance:  unremarkable, age appropriate, normal weight, well nourished, casually dressed, neatly groomed   Behavior:  normal, friendly and cooperative, eye contact normal   Speech:  no latency; no press   Mood:  euthymic   Affect:  guarded   Thought Process:  normal and logical   Thought Content:  normal, no suicidality, no homicidality, delusions, or paranoia   Sensorium:  grossly intact   Cognition:  grossly intact and fund of knowledge intact and appropriate to age and level of education   Insight:  intact   Judgment:  behavior is adequate to circumstances, age appropriate       Functioning in Relationships:  Spouse/Partner/Family: supportive - has a good relationship with his parents but he doesn't feel overly close to them; has a good relationship with his girlfriend  Peers: see HPI  Employers: stable/is also a college student      Labs: reviewed most recent labs    Laboratory Data  No  visits with results within 1 Month(s) from this visit.   Latest known visit with results is:   No results found for any previous visit.         Medications  Outpatient Encounter Medications as of 11/18/2022   Medication Sig Dispense Refill    [DISCONTINUED] albuterol (PROVENTIL/VENTOLIN HFA) 90 mcg/actuation inhaler Inhale 2 puffs into the lungs every 6 (six) hours as needed.      [DISCONTINUED] HYDROcodone-acetaminophen (NORCO) 7.5-325 mg per tablet Take 1 tablet by mouth every 4 (four) hours as needed for Pain. 20 tablet 0    [DISCONTINUED] hydrOXYzine HCL (ATARAX) 25 MG tablet Take 1 tablet (25 mg total) by mouth 2 (two) times daily as needed for Anxiety. 20 tablet 1    [DISCONTINUED] mupirocin (BACTROBAN) 2 % ointment 1 application      [DISCONTINUED] traMADoL (ULTRAM) 50 mg tablet Take 1 tablet (50 mg total) by mouth every 6 (six) hours as needed for Pain. 12 tablet 0     No facility-administered encounter medications on file as of 11/18/2022.           Assessment - Diagnosis - Goals:     Impression:       ICD-10-CM ICD-9-CM   1. Generalized anxiety disorder with panic attacks  F41.1 300.02    F41.0 300.01   2. Social anxiety disorder  F40.10 300.23   3. Depression, unspecified depression type  F32.A 311   4. Marijuana abuse  F12.10 305.20       Strengths and Liabilities: Strength: Patient accepts guidance/feedback, Strength: Patient is expressive/articulate., Strength: Patient is intelligent., Strength: Patient is motivated for change., Strength: Patient is physically healthy., Strength: Patient has positive support network., Strength: Patient has reasonable judgment., Strength: Patient is stable., Liability: Patient lacks coping skills.      Treatment Goals:    Depression: decreasing worthlessness, increasing energy, increasing interest in usual activities, increasing motivation, increasing self-reward for positive behaviors (one/day), increasing self-reward for positive thoughts (one/day), increasing social  contacts (three/week), reducing excessive guilt, reducing fatigue and reducing negative automatic thoughts    Anxiety: acquiring relapse prevention skills, eliminating assumptions about dangerousness of anxiety, positive value of worry, or other assumptions, eliminating avoidance, modifying schemata of threat/vulnerability/need for control, reducing negative automatic thoughts, reducing physical symptoms of anxiety and reducing time spent worrying (<30 minutes/day)    Panic: acquiring breathing skills, acquiring relapse prevention skills, eliminating all avoidance behavior, eliminating conditioned anxiety response to physical sensations, eliminating safety behaviors, engaging in all previously avoided activities, no panic attacks for 1 month, reducing physical symptoms of anxiety/panic and reporting that fear of future panic attacks has been reduced to less than 1 on a scale of 0-10    Substance use: identify and avoid triggers, learn skills necessary to obtain and sustain sobriety, learn to express negative feelings appropriately, obtain a sponsor and go to at least 3 AA/NA meeting each week.      Treatment Plan/Recommendations:   Medication Management:  Start trial of Buspar 5 mg TID x 5 days then increase to 10 mg TID  Labs: most recent labs reviewed  Marijuana: Pt was encouraged not to use marijuana and was educated on the potential consequences of marijuana use. Pt was educated on the following: the most commonly reported side effects of using marijuana are intense anxiety and panic attacks. The drug can actually increase symptoms of depression or promote the development of this disorder. The drug appears to induce manic episodes and increases rapid cycling between manic and depressive moods. It exacerbates psychotic symptoms and worsens outcomes in patients already diagnosed with schizophrenia or other psychotic disorders. It can cause paranoia, lack of motivation and mood swings. Studies suggest that  long-term use of marijuana may cause lasting impairments in executive function. Marijuana impairs short-term memory, judgement, and motor coordination and causes slowed reaction time. Use of the drug in young adults can cause lasting damage to the brain and decrease intelligence.  The treatment plan and follow up plan were reviewed with the patient.  Discussed with patient informed consent, risks vs. benefits, alternative treatments, side effect profile and the inherent unpredictability of individual responses to treatments and all medications prescribed. The patient expresses understanding of the above and displays the capacity to agree with this current plan and had no other questions.   Encouraged the patient to keep future appointments.   Take medications as prescribed and abstain from substance use.   Pt was told to present to ED or call 911 for SI/HI plan or intent, psychosis, or other psychiatric or medical emergency, and pt agrees to this and verbalized understanding.        Referral to: Outpatient individual therapy.    Return to Clinic: 3 weeks      Face to Face time with patient: 45 minutes  Total time: 70 minutes of total time spent on the encounter, which includes face to face time and non-face to face time preparing to see the patient (eg, review of tests), Obtaining and/or reviewing separately obtained history, Documenting clinical information in the electronic or other health record, Independently interpreting results (not separately reported) and communicating results to the patient/family/caregiver, or Care coordination (not separately reported).         Elvia Watt, MSN, APRN, PMHNP-BC [facility]

## 2023-06-01 ENCOUNTER — PATIENT MESSAGE (OUTPATIENT)
Dept: ORTHOPEDICS | Facility: CLINIC | Age: 23
End: 2023-06-01
Payer: COMMERCIAL

## 2023-06-28 ENCOUNTER — TELEPHONE (OUTPATIENT)
Dept: PRIMARY CARE CLINIC | Facility: CLINIC | Age: 23
End: 2023-06-28

## 2023-06-28 ENCOUNTER — PATIENT MESSAGE (OUTPATIENT)
Dept: PRIMARY CARE CLINIC | Facility: CLINIC | Age: 23
End: 2023-06-28

## 2023-06-28 ENCOUNTER — OFFICE VISIT (OUTPATIENT)
Dept: PRIMARY CARE CLINIC | Facility: CLINIC | Age: 23
End: 2023-06-28
Payer: COMMERCIAL

## 2023-06-28 ENCOUNTER — LAB VISIT (OUTPATIENT)
Dept: LAB | Facility: HOSPITAL | Age: 23
End: 2023-06-28
Attending: STUDENT IN AN ORGANIZED HEALTH CARE EDUCATION/TRAINING PROGRAM
Payer: COMMERCIAL

## 2023-06-28 VITALS
WEIGHT: 182.56 LBS | TEMPERATURE: 99 F | HEART RATE: 70 BPM | HEIGHT: 75 IN | OXYGEN SATURATION: 97 % | DIASTOLIC BLOOD PRESSURE: 70 MMHG | BODY MASS INDEX: 22.7 KG/M2 | SYSTOLIC BLOOD PRESSURE: 116 MMHG

## 2023-06-28 DIAGNOSIS — Z11.3 ROUTINE SCREENING FOR STI (SEXUALLY TRANSMITTED INFECTION): ICD-10-CM

## 2023-06-28 DIAGNOSIS — F41.0 GENERALIZED ANXIETY DISORDER WITH PANIC ATTACKS: ICD-10-CM

## 2023-06-28 DIAGNOSIS — Z11.3 ROUTINE SCREENING FOR STI (SEXUALLY TRANSMITTED INFECTION): Primary | ICD-10-CM

## 2023-06-28 DIAGNOSIS — F41.1 GENERALIZED ANXIETY DISORDER WITH PANIC ATTACKS: ICD-10-CM

## 2023-06-28 LAB
HCV AB SERPL QL IA: NORMAL
HIV 1+2 AB+HIV1 P24 AG SERPL QL IA: NORMAL
TSH SERPL DL<=0.005 MIU/L-ACNC: 0.67 UIU/ML (ref 0.4–4)

## 2023-06-28 PROCEDURE — 3078F PR MOST RECENT DIASTOLIC BLOOD PRESSURE < 80 MM HG: ICD-10-PCS | Mod: CPTII,S$GLB,, | Performed by: STUDENT IN AN ORGANIZED HEALTH CARE EDUCATION/TRAINING PROGRAM

## 2023-06-28 PROCEDURE — 1159F MED LIST DOCD IN RCRD: CPT | Mod: CPTII,S$GLB,, | Performed by: STUDENT IN AN ORGANIZED HEALTH CARE EDUCATION/TRAINING PROGRAM

## 2023-06-28 PROCEDURE — 87591 N.GONORRHOEAE DNA AMP PROB: CPT | Performed by: STUDENT IN AN ORGANIZED HEALTH CARE EDUCATION/TRAINING PROGRAM

## 2023-06-28 PROCEDURE — 86803 HEPATITIS C AB TEST: CPT | Performed by: STUDENT IN AN ORGANIZED HEALTH CARE EDUCATION/TRAINING PROGRAM

## 2023-06-28 PROCEDURE — 86592 SYPHILIS TEST NON-TREP QUAL: CPT | Performed by: STUDENT IN AN ORGANIZED HEALTH CARE EDUCATION/TRAINING PROGRAM

## 2023-06-28 PROCEDURE — 99999 PR PBB SHADOW E&M-EST. PATIENT-LVL III: CPT | Mod: PBBFAC,,, | Performed by: STUDENT IN AN ORGANIZED HEALTH CARE EDUCATION/TRAINING PROGRAM

## 2023-06-28 PROCEDURE — 1159F PR MEDICATION LIST DOCUMENTED IN MEDICAL RECORD: ICD-10-PCS | Mod: CPTII,S$GLB,, | Performed by: STUDENT IN AN ORGANIZED HEALTH CARE EDUCATION/TRAINING PROGRAM

## 2023-06-28 PROCEDURE — 3074F PR MOST RECENT SYSTOLIC BLOOD PRESSURE < 130 MM HG: ICD-10-PCS | Mod: CPTII,S$GLB,, | Performed by: STUDENT IN AN ORGANIZED HEALTH CARE EDUCATION/TRAINING PROGRAM

## 2023-06-28 PROCEDURE — 3008F PR BODY MASS INDEX (BMI) DOCUMENTED: ICD-10-PCS | Mod: CPTII,S$GLB,, | Performed by: STUDENT IN AN ORGANIZED HEALTH CARE EDUCATION/TRAINING PROGRAM

## 2023-06-28 PROCEDURE — 3074F SYST BP LT 130 MM HG: CPT | Mod: CPTII,S$GLB,, | Performed by: STUDENT IN AN ORGANIZED HEALTH CARE EDUCATION/TRAINING PROGRAM

## 2023-06-28 PROCEDURE — 3078F DIAST BP <80 MM HG: CPT | Mod: CPTII,S$GLB,, | Performed by: STUDENT IN AN ORGANIZED HEALTH CARE EDUCATION/TRAINING PROGRAM

## 2023-06-28 PROCEDURE — 87389 HIV-1 AG W/HIV-1&-2 AB AG IA: CPT | Performed by: STUDENT IN AN ORGANIZED HEALTH CARE EDUCATION/TRAINING PROGRAM

## 2023-06-28 PROCEDURE — 99999 PR PBB SHADOW E&M-EST. PATIENT-LVL III: ICD-10-PCS | Mod: PBBFAC,,, | Performed by: STUDENT IN AN ORGANIZED HEALTH CARE EDUCATION/TRAINING PROGRAM

## 2023-06-28 PROCEDURE — 99214 OFFICE O/P EST MOD 30 MIN: CPT | Mod: S$GLB,,, | Performed by: STUDENT IN AN ORGANIZED HEALTH CARE EDUCATION/TRAINING PROGRAM

## 2023-06-28 PROCEDURE — 84443 ASSAY THYROID STIM HORMONE: CPT | Performed by: STUDENT IN AN ORGANIZED HEALTH CARE EDUCATION/TRAINING PROGRAM

## 2023-06-28 PROCEDURE — 36415 COLL VENOUS BLD VENIPUNCTURE: CPT | Mod: PN | Performed by: STUDENT IN AN ORGANIZED HEALTH CARE EDUCATION/TRAINING PROGRAM

## 2023-06-28 PROCEDURE — 99214 PR OFFICE/OUTPT VISIT, EST, LEVL IV, 30-39 MIN: ICD-10-PCS | Mod: S$GLB,,, | Performed by: STUDENT IN AN ORGANIZED HEALTH CARE EDUCATION/TRAINING PROGRAM

## 2023-06-28 PROCEDURE — 3008F BODY MASS INDEX DOCD: CPT | Mod: CPTII,S$GLB,, | Performed by: STUDENT IN AN ORGANIZED HEALTH CARE EDUCATION/TRAINING PROGRAM

## 2023-06-28 RX ORDER — BUSPIRONE HYDROCHLORIDE 10 MG/1
10 TABLET ORAL 2 TIMES DAILY
Qty: 60 TABLET | Refills: 5 | Status: SHIPPED | OUTPATIENT
Start: 2023-06-28 | End: 2023-06-28

## 2023-06-28 RX ORDER — BUSPIRONE HYDROCHLORIDE 10 MG/1
TABLET ORAL
Qty: 63 TABLET | Refills: 0 | Status: SHIPPED | OUTPATIENT
Start: 2023-06-28 | End: 2023-09-16 | Stop reason: SDUPTHER

## 2023-06-28 RX ORDER — BUSPIRONE HYDROCHLORIDE 10 MG/1
10 TABLET ORAL 3 TIMES DAILY
Qty: 90 TABLET | Refills: 5 | Status: SHIPPED | OUTPATIENT
Start: 2023-06-28 | End: 2023-06-28

## 2023-06-28 NOTE — PROGRESS NOTES
"Primary Care  Return/Acute Office Visit - In Person  2023  Mwengwe Ndhlovu      [facility]    Patient is a 22 y.o.   Marco Vallecillo  has a past medical history of Anxiety disorder, unspecified, Depression, History of psychiatric hospitalization, psychiatric care, Mild intermittent asthma, uncomplicated, Psychiatric problem, and Therapy.    Patient presents with   Chief Complaint   Patient presents with    Anxiety     Patient reports that he has been experiencing panic attacks when overwhelmed and when speaking to people that he doesn't know     He reports symptoms of seasonal depression. Denies any symptoms of depression currently     He reports family history of anxiety in mother and father     He denies any suicidality     He was prescribed Buspar by another physician but by the time he decided to start taking it the prescription had        Social History     Social History Narrative    Lives with a roommate.  for [employer]. 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     Marco Vallecillo family history includes Anxiety disorder in his father, maternal grandmother, and mother; Pancreatic cancer in his maternal grandmother; Skin cancer in his maternal grandmother.    Active Medications:  Review of patient's allergies indicates:   Allergen Reactions    Peanut      "Throat swells up"     Current Outpatient Medications   Medication Instructions    busPIRone (BUSPAR) 10 mg, Oral, 2 times daily       Review of Systems   All other systems reviewed and are negative.    Vitals:    23 1442   BP: 116/70   BP Location: Right arm   Pulse: 70   Temp: 98.9 °F (37.2 °C)   SpO2: 97%   Weight: 82.8 kg (182 lb 8.7 oz)   Height: 6' 2.5" (1.892 m)       Physical Exam  Vitals reviewed.   Constitutional:       General: He is not in acute distress.  Cardiovascular:      Rate and Rhythm: Normal rate and regular rhythm.      Pulses: Normal pulses.      Heart sounds: Normal heart sounds.   Pulmonary:      Effort: Pulmonary " effort is normal.      Breath sounds: Normal breath sounds.   Abdominal:      General: Abdomen is flat. Bowel sounds are normal.      Palpations: Abdomen is soft.   Musculoskeletal:      Right lower leg: No edema.      Left lower leg: No edema.   Neurological:      General: No focal deficit present.      Mental Status: He is oriented to person, place, and time.        Assessment and Plan     SALOMÓN   Buspar 10 mg BID  F/u TSH      STI screening   Completed     Orders Placed This Visit  Orders Placed This Encounter   Procedures    C. trachomatis/N. gonorrhoeae by AMP DNA Eladiasshana; Urine     Order Specific Question:   Sources by Resulting Lab:     Answer:   [facility]     Order Specific Question:   Source:     Answer:   Urine     Order Specific Question:   Release to patient     Answer:   Immediate    TSH     Standing Status:   Future     Standing Expiration Date:   8/26/2024    Hepatitis C Antibody     Standing Status:   Future     Standing Expiration Date:   8/26/2024     Order Specific Question:   Release to patient     Answer:   Immediate    HIV 1/2 Ag/Ab (4th Gen)     Standing Status:   Future     Standing Expiration Date:   8/26/2024     Order Specific Question:   Release to patient     Answer:   Immediate    RPR     Standing Status:   Future     Standing Expiration Date:   8/26/2024     Order Specific Question:   Release to patient     Answer:   Immediate             Upcoming Scheduled Appointments and Follow Up:    No future appointments.    Follow Up [facility] (with who? when?): Follow up in about 1 month (around 7/28/2023) for Virtual Visit.      Extended Emergency Contact Information  Primary Emergency Contact: Shara Vallecillo  Mobile Phone: [phone]  Relation: Mother  Preferred language: English   needed? No      Elkin Garza MD   Attending Physician  Primary Care  6/28/2023 - 2:54 PM    I spent a total of 12 minutes on the day of the visit.This includes face to face time and non-face to face  time preparing to see the patient (eg, review of tests), obtaining and/or reviewing separately obtained history, documenting clinical information in the electronic or other health record, independently interpreting results and communicating results to the patient/family/caregiver, or care coordinator.

## 2023-06-28 NOTE — TELEPHONE ENCOUNTER
----- Message from Vinny Nunez sent at 6/28/2023  3:17 PM CDT -----  Contact: [facility] [phone]  Pharmacy is calling to clarify an RX.  RX name:  busPIRone (BUSPAR) 10 MG tablet  What do they need to clarify:  How many day before the incase dose   Comments:   Please call Maddie back

## 2023-06-29 LAB
C TRACH DNA SPEC QL NAA+PROBE: NOT DETECTED
N GONORRHOEA DNA SPEC QL NAA+PROBE: NOT DETECTED
RPR SER QL: NORMAL

## 2023-08-28 ENCOUNTER — PATIENT MESSAGE (OUTPATIENT)
Dept: PRIMARY CARE CLINIC | Facility: CLINIC | Age: 23
End: 2023-08-28

## 2023-08-29 ENCOUNTER — OFFICE VISIT (OUTPATIENT)
Dept: ORTHOPEDICS | Facility: CLINIC | Age: 23
End: 2023-08-29
Payer: COMMERCIAL

## 2023-08-29 VITALS
BODY MASS INDEX: 23.55 KG/M2 | DIASTOLIC BLOOD PRESSURE: 71 MMHG | WEIGHT: 189.38 LBS | HEART RATE: 69 BPM | HEIGHT: 75 IN | SYSTOLIC BLOOD PRESSURE: 122 MMHG

## 2023-08-29 DIAGNOSIS — M25.362 INSTABILITY OF LEFT KNEE JOINT: ICD-10-CM

## 2023-08-29 DIAGNOSIS — M23.92 INTERNAL DERANGEMENT OF LEFT KNEE: Primary | ICD-10-CM

## 2023-08-29 DIAGNOSIS — M25.562 ACUTE PAIN OF LEFT KNEE: ICD-10-CM

## 2023-08-29 PROCEDURE — 99214 OFFICE O/P EST MOD 30 MIN: CPT | Mod: S$GLB,,, | Performed by: PHYSICIAN ASSISTANT

## 2023-08-29 PROCEDURE — 3074F SYST BP LT 130 MM HG: CPT | Mod: CPTII,S$GLB,, | Performed by: PHYSICIAN ASSISTANT

## 2023-08-29 PROCEDURE — 3078F PR MOST RECENT DIASTOLIC BLOOD PRESSURE < 80 MM HG: ICD-10-PCS | Mod: CPTII,S$GLB,, | Performed by: PHYSICIAN ASSISTANT

## 2023-08-29 PROCEDURE — 1159F PR MEDICATION LIST DOCUMENTED IN MEDICAL RECORD: ICD-10-PCS | Mod: CPTII,S$GLB,, | Performed by: PHYSICIAN ASSISTANT

## 2023-08-29 PROCEDURE — 3008F PR BODY MASS INDEX (BMI) DOCUMENTED: ICD-10-PCS | Mod: CPTII,S$GLB,, | Performed by: PHYSICIAN ASSISTANT

## 2023-08-29 PROCEDURE — 99999 PR PBB SHADOW E&M-EST. PATIENT-LVL III: CPT | Mod: PBBFAC,,, | Performed by: PHYSICIAN ASSISTANT

## 2023-08-29 PROCEDURE — 1160F RVW MEDS BY RX/DR IN RCRD: CPT | Mod: CPTII,S$GLB,, | Performed by: PHYSICIAN ASSISTANT

## 2023-08-29 PROCEDURE — 3078F DIAST BP <80 MM HG: CPT | Mod: CPTII,S$GLB,, | Performed by: PHYSICIAN ASSISTANT

## 2023-08-29 PROCEDURE — 99214 PR OFFICE/OUTPT VISIT, EST, LEVL IV, 30-39 MIN: ICD-10-PCS | Mod: S$GLB,,, | Performed by: PHYSICIAN ASSISTANT

## 2023-08-29 PROCEDURE — 3074F PR MOST RECENT SYSTOLIC BLOOD PRESSURE < 130 MM HG: ICD-10-PCS | Mod: CPTII,S$GLB,, | Performed by: PHYSICIAN ASSISTANT

## 2023-08-29 PROCEDURE — 3008F BODY MASS INDEX DOCD: CPT | Mod: CPTII,S$GLB,, | Performed by: PHYSICIAN ASSISTANT

## 2023-08-29 PROCEDURE — 1160F PR REVIEW ALL MEDS BY PRESCRIBER/CLIN PHARMACIST DOCUMENTED: ICD-10-PCS | Mod: CPTII,S$GLB,, | Performed by: PHYSICIAN ASSISTANT

## 2023-08-29 PROCEDURE — 1159F MED LIST DOCD IN RCRD: CPT | Mod: CPTII,S$GLB,, | Performed by: PHYSICIAN ASSISTANT

## 2023-08-29 PROCEDURE — 99999 PR PBB SHADOW E&M-EST. PATIENT-LVL III: ICD-10-PCS | Mod: PBBFAC,,, | Performed by: PHYSICIAN ASSISTANT

## 2023-08-29 NOTE — PROGRESS NOTES
"  SUBJECTIVE:     Chief Complaint & History of Present Illness:  Marco Vallecillo is a Established patient 23 y.o. male who is seen here today with a complaint of    Chief Complaint   Patient presents with    Left Knee - Pain    .  He is patient well-known to me was last seen treated the clinic for this condition 09/23/2022 that time we had opted for conservative care of the knee and he had been responding very well but has had return of pain soreness now having episodes of giving way and feelings of instability particularly with high stress and high impact activities particularly basketball.  He does have minor swelling associated with these episodes of pain and giving way  On a scale of 1-10, with 10 being worst pain imaginable, he rates this pain as 3 on good days and 8 on bad days.  he describes the pain as tender and sore.    Review of patient's allergies indicates:   Allergen Reactions    Peanut      "Throat swells up"         Current Outpatient Medications   Medication Sig Dispense Refill    busPIRone (BUSPAR) 10 MG tablet Take 1 tablet (10 mg total) by mouth every evening for 3 days, THEN 1 tablet (10 mg total) 2 (two) times daily. 63 tablet 0     No current facility-administered medications for this visit.       Past Medical History:   Diagnosis Date    Anxiety disorder, unspecified     Depression     History of psychiatric hospitalization     Hx of psychiatric care     Mild intermittent asthma, uncomplicated     Psychiatric problem     Therapy        Past Surgical History:   Procedure Laterality Date    ARTHROSCOPY, SHOULDER, WITH CAPSULORRHAPHY Left 7/26/2022    Procedure: ARTHROSCOPY,SHOULDER,WITH CAPSULORRHAPHY - [facility];  Surgeon: Jaycee Mcguire MD;  Location: [facility];  Service: Orthopedics;  Laterality: Left;  regional block    REPAIR OF SUPERIOR LABRAL ANTERIOR-TO-POSTERIOR (SLAP) TEAR OF SHOULDER Left 7/26/2022    Procedure: REPAIR, SLAP LESION, SHOULDER;  Surgeon: Jaycee Mcguire MD;  " "Location: [facility] OR;  Service: Orthopedics;  Laterality: Left;  regional block    WISDOM TOOTH EXTRACTION         Vital Signs (Most Recent)  Vitals:    08/29/23 0743   BP: 122/71   Pulse: 69           Review of Systems:  ROS:  Constitutional: no fever or chills  Eyes: no visual changes  ENT: no nasal congestion or sore throat  Respiratory: no cough or shortness of breath  Cardiovascular: no chest pain or palpitations  Gastrointestinal: no nausea or vomiting, tolerating diet  Genitourinary: no hematuria or dysuria  Integument/Breast: no rash or pruritis  Hematologic/Lymphatic: no easy bruising or lymphadenopathy  Musculoskeletal: no arthralgias or myalgias  Neurological: no seizures or tremors  Behavioral/Psych: no auditory or visual hallucinations  Endocrine: no heat or cold intolerance                OBJECTIVE:     PHYSICAL EXAM:  Height: 6' 2.5" (189.2 cm) Weight: 85.9 kg (189 lb 6 oz), General Appearance: Well nourished, well developed, in no acute distress.  Neurological: Mood & affect are normal.    left  Knee Exam:  Knee Range of Motion:0-130 degrees flexion   Effusion:  Slight  Condition of skin:intact  Location of tenderness:Lateral joint line   Strength:limited by pain and 5 of 5  Stability:  Lachman: stable, LCL: stable, MCL: stable, PCL: stable, and posteromedial (dial): stable  Varus /Valgus stress:  normal  Patricia:   Positive Lateral/Positive Lateral    right  Knee Exam:  Knee Range of Motion:0-135 degrees flexion   Effusion:none  Condition of skin:intact  Location of tenderness:None   Strength:5 of 5  Stability:  Lachman: stable, LCL: stable, MCL: stable, PCL: stable, and posteromedial (dial): stable  Varus /Valgus stress:  normal  Patricia:   negative/negative      Hip Examination:  full painless range of motion, without tenderness    RADIOGRAPHS:  X-rays from previous visit reviewed by me today demonstrate no evidence of fracture dislocation joint spaces well maintained no advanced degenerative joint " disease    ASSESSMENT/PLAN:       ICD-10-CM ICD-9-CM   1. Internal derangement of left knee  M23.92 717.9   2. Acute pain of left knee  M25.562 719.46   3. Instability of left knee joint  M25.362 718.86       Plan: We discussed with the patient at length all the different treatment options available for  the knee including anti-inflammatories, acetaminophen, rest, ice, knee strengthening exercise, occasional cortisone injections for temporary relief, Viscosupplimentation injections, arthroscopic debridement osteotomy, and finally knee arthroplasty.   Light of patient's continuing and worsening pain in or new onset instability will move forward with MRI of the left knee.  Place him in a hinged knee brace for support and protection.  I will see him back following the MRI to discuss treatment options

## 2023-09-07 ENCOUNTER — TELEPHONE (OUTPATIENT)
Dept: ORTHOPEDICS | Facility: CLINIC | Age: 23
End: 2023-09-07
Payer: COMMERCIAL

## 2023-09-07 DIAGNOSIS — M25.562 ACUTE PAIN OF LEFT KNEE: ICD-10-CM

## 2023-09-07 DIAGNOSIS — M25.362 INSTABILITY OF LEFT KNEE JOINT: Primary | ICD-10-CM

## 2023-09-07 NOTE — TELEPHONE ENCOUNTER
Spoke with patient regarding faxing MRI orders over to [facility] fax # [phone]

## 2023-09-07 NOTE — TELEPHONE ENCOUNTER
----- Message from Yanira Malik sent at 9/7/2023 12:34 PM CDT -----  Regarding: MRI  Contact: pt  Pt Would like Orders for MRI Fax To [facility], [facility]  Fax Number-  [phone].    Please call to Pt to Advise    Phone [phone]    Thank You       Include Location In Plan?: No Detail Level: Simple Detail Level: Zone

## 2023-09-12 ENCOUNTER — TELEPHONE (OUTPATIENT)
Dept: ORTHOPEDICS | Facility: CLINIC | Age: 23
End: 2023-09-12
Payer: COMMERCIAL

## 2023-09-12 NOTE — TELEPHONE ENCOUNTER
Spoke with patient regarding orders for outside MRI was faxed to [facility] on 9-7-23 , I faxed it again today 9-12-23

## 2023-09-12 NOTE — TELEPHONE ENCOUNTER
----- Message from Za WEBER Route sent at 9/12/2023 11:17 AM CDT -----  Regarding: MRI Orders  Contact: [phone]  Pt is asked that his MRI Orders be Faxed to [facility], [address] [place] [address], [phone]  Pt is asking for someone to let him know when orders have been faxed over.   Patient Requesting Call Back @ [phone]

## 2023-09-16 ENCOUNTER — PATIENT MESSAGE (OUTPATIENT)
Dept: ORTHOPEDICS | Facility: CLINIC | Age: 23
End: 2023-09-16
Payer: COMMERCIAL

## 2023-09-18 ENCOUNTER — PATIENT MESSAGE (OUTPATIENT)
Dept: PRIMARY CARE CLINIC | Facility: CLINIC | Age: 23
End: 2023-09-18
Payer: COMMERCIAL

## 2023-09-18 ENCOUNTER — TELEPHONE (OUTPATIENT)
Dept: PRIMARY CARE CLINIC | Facility: CLINIC | Age: 23
End: 2023-09-18
Payer: COMMERCIAL

## 2023-09-18 RX ORDER — BUSPIRONE HYDROCHLORIDE 10 MG/1
TABLET ORAL
Qty: 63 TABLET | Refills: 0 | Status: SHIPPED | OUTPATIENT
Start: 2023-09-18 | End: 2023-11-29 | Stop reason: SDUPTHER

## 2023-09-18 NOTE — TELEPHONE ENCOUNTER
----- Message from Elkin Garza MD sent at 9/18/2023  9:33 AM CDT -----  No show last appointment   Needs follow up (can be virtual) before next refill

## 2023-09-19 ENCOUNTER — TELEPHONE (OUTPATIENT)
Dept: ORTHOPEDICS | Facility: CLINIC | Age: 23
End: 2023-09-19
Payer: COMMERCIAL

## 2023-09-20 ENCOUNTER — TELEPHONE (OUTPATIENT)
Dept: SPORTS MEDICINE | Facility: CLINIC | Age: 23
End: 2023-09-20
Payer: COMMERCIAL

## 2023-09-20 ENCOUNTER — PATIENT MESSAGE (OUTPATIENT)
Dept: SPORTS MEDICINE | Facility: CLINIC | Age: 23
End: 2023-09-20
Payer: COMMERCIAL

## 2023-09-20 NOTE — TELEPHONE ENCOUNTER
Left VM stating patient needs to bring MRI results on a disc as well as the radiologist findings if he got an MRI outside of [facility]. I will send a portal message as well.     ----- Message from Dusty Adkins PA-C sent at 9/20/2023  7:27 AM CDT -----  Regarding: Bring MRI  If he got an MRI outside of [facility], he needs to bring it with him along with radiologist findings. Needs xrays.    Thanks

## 2023-09-20 NOTE — TELEPHONE ENCOUNTER
Called patient and let him know he does have to go  the disc as well as radiologist findings. Patient agreed and will bring them to his appointment tomorrow.     ----- Message from Rosa M Humphries sent at 9/20/2023  9:20 AM CDT -----  Contact: pt  Pt calling to see if he can use the imaging on his phone , doesn't have actually disc ,  can pick the     disc up but says he really doesn't feel like it , please call     Confirmed patient's contact info below:  Contact Name: Marco Luis Fernando Vallecillo  Phone Number: [phone]

## 2023-09-21 ENCOUNTER — HOSPITAL ENCOUNTER (OUTPATIENT)
Dept: RADIOLOGY | Facility: HOSPITAL | Age: 23
Discharge: HOME | End: 2023-09-21
Attending: ORTHOPAEDIC SURGERY
Payer: COMMERCIAL

## 2023-09-21 ENCOUNTER — OFFICE VISIT (OUTPATIENT)
Dept: SPORTS MEDICINE | Facility: CLINIC | Age: 23
End: 2023-09-21
Payer: COMMERCIAL

## 2023-09-21 VITALS — BODY MASS INDEX: 23.85 KG/M2 | HEIGHT: 75 IN | WEIGHT: 191.81 LBS

## 2023-09-21 DIAGNOSIS — M25.562 ACUTE PAIN OF LEFT KNEE: Primary | ICD-10-CM

## 2023-09-21 DIAGNOSIS — M25.362 KNEE INSTABILITY, LEFT: ICD-10-CM

## 2023-09-21 DIAGNOSIS — M25.562 LEFT KNEE PAIN, UNSPECIFIED CHRONICITY: ICD-10-CM

## 2023-09-21 PROCEDURE — 99999 PR PBB SHADOW E&M-EST. PATIENT-LVL III: ICD-10-PCS | Mod: PBBFAC,,, | Performed by: ORTHOPAEDIC SURGERY

## 2023-09-21 PROCEDURE — 99214 OFFICE O/P EST MOD 30 MIN: CPT | Mod: S$GLB,,, | Performed by: ORTHOPAEDIC SURGERY

## 2023-09-21 PROCEDURE — 73564 X-RAY EXAM KNEE 4 OR MORE: CPT | Mod: TC,50

## 2023-09-21 PROCEDURE — 1159F MED LIST DOCD IN RCRD: CPT | Mod: CPTII,S$GLB,, | Performed by: ORTHOPAEDIC SURGERY

## 2023-09-21 PROCEDURE — 73564 XR KNEE ORTHO BILAT WITH FLEXION: ICD-10-PCS | Mod: 26,,, | Performed by: RADIOLOGY

## 2023-09-21 PROCEDURE — 99999 PR PBB SHADOW E&M-EST. PATIENT-LVL III: CPT | Mod: PBBFAC,,, | Performed by: ORTHOPAEDIC SURGERY

## 2023-09-21 PROCEDURE — 99214 PR OFFICE/OUTPT VISIT, EST, LEVL IV, 30-39 MIN: ICD-10-PCS | Mod: S$GLB,,, | Performed by: ORTHOPAEDIC SURGERY

## 2023-09-21 PROCEDURE — 1160F PR REVIEW ALL MEDS BY PRESCRIBER/CLIN PHARMACIST DOCUMENTED: ICD-10-PCS | Mod: CPTII,S$GLB,, | Performed by: ORTHOPAEDIC SURGERY

## 2023-09-21 PROCEDURE — 1159F PR MEDICATION LIST DOCUMENTED IN MEDICAL RECORD: ICD-10-PCS | Mod: CPTII,S$GLB,, | Performed by: ORTHOPAEDIC SURGERY

## 2023-09-21 PROCEDURE — 1160F RVW MEDS BY RX/DR IN RCRD: CPT | Mod: CPTII,S$GLB,, | Performed by: ORTHOPAEDIC SURGERY

## 2023-09-21 PROCEDURE — 3008F PR BODY MASS INDEX (BMI) DOCUMENTED: ICD-10-PCS | Mod: CPTII,S$GLB,, | Performed by: ORTHOPAEDIC SURGERY

## 2023-09-21 PROCEDURE — 3008F BODY MASS INDEX DOCD: CPT | Mod: CPTII,S$GLB,, | Performed by: ORTHOPAEDIC SURGERY

## 2023-09-21 PROCEDURE — 73564 X-RAY EXAM KNEE 4 OR MORE: CPT | Mod: 26,,, | Performed by: RADIOLOGY

## 2023-09-21 NOTE — PROGRESS NOTES
Subjective:     Chief Complaint: Marco Vallecillo is a 23 y.o. male who had concerns including Pain of the Left Knee.    HPI    Patient who is a student at [employer] presents to clinic with acute left pain x 4 weeks. Patient states the pain began following a basketball game. He denies any NARINDER or traumatic event.  Pain is localized along the lateral aspect of the knee at the joint line is accompanied with subjective instability.  He is made worse with terminal flexion.  He rates the pain as 4/10 at rest and 6/10 with activity.  He recently obtained an MRI of the left knee which she has brought with him today.  He has attempted multiple conservative measures that include activity modification, ice & elevation, and oral medications (Tylenol). He denies any mechanical symptoms to include locking and catching or instability.  Is affecting ADLs and limiting desired level of activity. Denies numbness, tingling, radiation, and inability to bear weight. He is here today to discuss treatment options.    No previous surgeries or trauma on bilateral knee      Review of Systems   Constitutional: Negative.   HENT: Negative.     Eyes: Negative.    Cardiovascular: Negative.    Respiratory: Negative.     Endocrine: Negative.    Hematologic/Lymphatic: Negative.    Skin: Negative.    Musculoskeletal:  Positive for joint pain and stiffness. Negative for falls, joint swelling and muscle weakness.   Neurological: Negative.    Psychiatric/Behavioral: Negative.     Allergic/Immunologic: Negative.        Pain Related Questions  Over the past 3 days, what was your average pain during activity? (I.e. running, jogging, walking, climbing stairs, getting dressed, ect.): 8  Over the past 3 days, what was your highest pain level?: 8  Over the past 3 days, what was your lowest pain level? : 4    Other  Was the patient's HEIGHT measured or patient reported?: Patient Reported  Was the patient's WEIGHT measured or patient reported?: Measured    Objective:      General: Marco is well-developed, well-nourished, appears stated age, in no acute distress, alert and oriented to time, place and person.     General    Nursing note and vitals reviewed.  Constitutional: He is oriented to person, place, and time. He appears well-developed and well-nourished. No distress.   HENT:   Head: Normocephalic and atraumatic.   Nose: Nose normal.   Eyes: EOM are normal.   Cardiovascular:  Intact distal pulses.            Pulmonary/Chest: Effort normal. No respiratory distress.   Neurological: He is alert and oriented to person, place, and time.   Psychiatric: He has a normal mood and affect. His behavior is normal. Judgment and thought content normal.           Right Knee Exam     Inspection   Erythema: absent  Scars: absent  Swelling: absent  Effusion: absent  Deformity: absent  Bruising: absent    Tenderness   The patient is experiencing no tenderness.     Range of Motion   Extension:  -5   Flexion:  140     Tests   Meniscus   Patricia:  Medial - negative Lateral - negative  Ligament Examination   Lachman: normal (-1 to 2mm)   PCL-Posterior Drawer: normal (0 to 2mm)     MCL - Valgus: normal (0 to 2mm)  LCL - Varus: normal  Patella   Patellar apprehension: negative  Passive Patellar Tilt: neutral  Patellar Tracking: normal  Patellar Grind: negative    Other   Sensation: normal    Left Knee Exam     Inspection   Erythema: absent  Scars: absent  Swelling: present  Effusion: absent  Deformity: absent  Bruising: absent    Tenderness   The patient tender to palpation of the medial joint line and lateral joint line.    Range of Motion   Extension:  -5   Flexion:  140     Tests   Meniscus   Patricia:  Medial - negative Lateral - negative  Stability   Lachman: normal (-1 to 2mm)   PCL-Posterior Drawer: normal (0 to 2mm)  MCL - Valgus: normal (0 to 2mm)  LCL - Varus: normal (0 to 2mm)  Patella   Patellar apprehension: negative  Passive Patellar Tilt: neutral  Patellar Tracking: normal  Patellar  Grind: negative    Other   Muscle Tightness: hamstring tightness  Sensation: normal    Muscle Strength   Right Lower Extremity   Quadriceps:  5/5   Hamstrin/5   Left Lower Extremity   Quadriceps:  5/5   Hamstrin/5     Vascular Exam     Right Pulses  Dorsalis Pedis:      2+  Posterior Tibial:      2+        Left Pulses  Dorsalis Pedis:      2+  Posterior Tibial:      2+        Edema  Left Lower Leg: absent    Radiographs bilateral knee     My interpretation:    No signs of fracture, contusion, swelling, DJD, or any other bony abnormalities noted.    MRI left knee     My interpretation:    There is a tear of the posterior horn of the medial meniscus visualized    Assessment:     Encounter Diagnoses   Name Primary?    Acute pain of left knee Yes    Knee instability, left         Plan:     1. I made the decision to order new imaging of the extremity or extremities evaluated. I independently reviewed and interpreted the radiographs and/or MRIs today. These images were shown to the patient where I then discussed my findings in detail.    2. We discussed at length different treatment options including conservative vs surgical management. These include anti-inflammatories, acetaminophen, rest, ice, heat, formal physical therapy including strengthening and stretching exercises, home exercise programs, dry needling, and finally surgical intervention.  After showing the patient's MRI we discussed multiple treatment options moving forward that consistent both nonoperative and operative management.  Nonoperative consist of formal physical therapy followed by re-evaluation.  Operative management would consist of left knee arthroscopy with possible meniscus repair versus partial meniscectomy.  At this time, patient would prefer surgery as he would like to continue playing basketball recreational only.    3. Referral placed to see my supervising physician Yvan Mayorga MD to discuss possible left knee arthroscopy.    4.  RTC to see Abdullahi Adkins PA-C in for weeks for possible preop appointment.      All of the patient's questions were answered. Patient was advised to call the clinic or contact me through the patient portal for any questions or concerns.       Medical Dictation software was used during the dictation of portions or the entirety of this medical record.  Phonetic or grammatic errors may exist due to the use of this software. For clarification, refer to the author of the document.      Patient questionnaires may have been collected.

## 2023-09-26 ENCOUNTER — OFFICE VISIT (OUTPATIENT)
Dept: SPORTS MEDICINE | Facility: CLINIC | Age: 23
End: 2023-09-26
Payer: COMMERCIAL

## 2023-09-26 VITALS
DIASTOLIC BLOOD PRESSURE: 70 MMHG | WEIGHT: 191 LBS | HEIGHT: 74 IN | BODY MASS INDEX: 24.51 KG/M2 | HEART RATE: 66 BPM | SYSTOLIC BLOOD PRESSURE: 120 MMHG

## 2023-09-26 DIAGNOSIS — S83.232A COMPLEX TEAR OF MEDIAL MENISCUS OF LEFT KNEE AS CURRENT INJURY, INITIAL ENCOUNTER: Primary | ICD-10-CM

## 2023-09-26 PROCEDURE — 99215 PR OFFICE/OUTPT VISIT, EST, LEVL V, 40-54 MIN: ICD-10-PCS | Mod: S$GLB,,, | Performed by: STUDENT IN AN ORGANIZED HEALTH CARE EDUCATION/TRAINING PROGRAM

## 2023-09-26 PROCEDURE — 99999 PR PBB SHADOW E&M-EST. PATIENT-LVL IV: ICD-10-PCS | Mod: PBBFAC,,, | Performed by: STUDENT IN AN ORGANIZED HEALTH CARE EDUCATION/TRAINING PROGRAM

## 2023-09-26 PROCEDURE — 1159F MED LIST DOCD IN RCRD: CPT | Mod: CPTII,S$GLB,, | Performed by: STUDENT IN AN ORGANIZED HEALTH CARE EDUCATION/TRAINING PROGRAM

## 2023-09-26 PROCEDURE — 99999 PR PBB SHADOW E&M-EST. PATIENT-LVL IV: CPT | Mod: PBBFAC,,, | Performed by: STUDENT IN AN ORGANIZED HEALTH CARE EDUCATION/TRAINING PROGRAM

## 2023-09-26 PROCEDURE — 1160F RVW MEDS BY RX/DR IN RCRD: CPT | Mod: CPTII,S$GLB,, | Performed by: STUDENT IN AN ORGANIZED HEALTH CARE EDUCATION/TRAINING PROGRAM

## 2023-09-26 PROCEDURE — 3008F PR BODY MASS INDEX (BMI) DOCUMENTED: ICD-10-PCS | Mod: CPTII,S$GLB,, | Performed by: STUDENT IN AN ORGANIZED HEALTH CARE EDUCATION/TRAINING PROGRAM

## 2023-09-26 PROCEDURE — 3008F BODY MASS INDEX DOCD: CPT | Mod: CPTII,S$GLB,, | Performed by: STUDENT IN AN ORGANIZED HEALTH CARE EDUCATION/TRAINING PROGRAM

## 2023-09-26 PROCEDURE — 1159F PR MEDICATION LIST DOCUMENTED IN MEDICAL RECORD: ICD-10-PCS | Mod: CPTII,S$GLB,, | Performed by: STUDENT IN AN ORGANIZED HEALTH CARE EDUCATION/TRAINING PROGRAM

## 2023-09-26 PROCEDURE — 3074F PR MOST RECENT SYSTOLIC BLOOD PRESSURE < 130 MM HG: ICD-10-PCS | Mod: CPTII,S$GLB,, | Performed by: STUDENT IN AN ORGANIZED HEALTH CARE EDUCATION/TRAINING PROGRAM

## 2023-09-26 PROCEDURE — 99215 OFFICE O/P EST HI 40 MIN: CPT | Mod: S$GLB,,, | Performed by: STUDENT IN AN ORGANIZED HEALTH CARE EDUCATION/TRAINING PROGRAM

## 2023-09-26 PROCEDURE — 3078F PR MOST RECENT DIASTOLIC BLOOD PRESSURE < 80 MM HG: ICD-10-PCS | Mod: CPTII,S$GLB,, | Performed by: STUDENT IN AN ORGANIZED HEALTH CARE EDUCATION/TRAINING PROGRAM

## 2023-09-26 PROCEDURE — 3078F DIAST BP <80 MM HG: CPT | Mod: CPTII,S$GLB,, | Performed by: STUDENT IN AN ORGANIZED HEALTH CARE EDUCATION/TRAINING PROGRAM

## 2023-09-26 PROCEDURE — 1160F PR REVIEW ALL MEDS BY PRESCRIBER/CLIN PHARMACIST DOCUMENTED: ICD-10-PCS | Mod: CPTII,S$GLB,, | Performed by: STUDENT IN AN ORGANIZED HEALTH CARE EDUCATION/TRAINING PROGRAM

## 2023-09-26 PROCEDURE — 3074F SYST BP LT 130 MM HG: CPT | Mod: CPTII,S$GLB,, | Performed by: STUDENT IN AN ORGANIZED HEALTH CARE EDUCATION/TRAINING PROGRAM

## 2023-09-26 NOTE — PROGRESS NOTES
Subjective:          Chief Complaint: Marco Vallecillo is a 23 y.o. male who had concerns including Pain of the Left Knee.    Marco Vallecillo is a 23 y.o. male [employer] student and  that presents for evaluation for his left knee pain. He is referred by Dusty Adkins PA-C. He states that his pain started about 1 month ago after suffering a twisting injury while playing basketball. He complains of pain over the medial aspect of the left knee.  It increases with deep squatting and twisting maneuvers.  He denies any true instability, however does endorse some catching in the knee. He has not completed any formal physical theapy or received any corticosteroid injections. He notes having increased pain at night and some difficulty sleeping.     Past Medical History:   Diagnosis Date    Anxiety disorder, unspecified     Depression     History of psychiatric hospitalization     Hx of psychiatric care     Mild intermittent asthma, uncomplicated     Psychiatric problem     Therapy        Current Outpatient Medications on File Prior to Visit   Medication Sig Dispense Refill    busPIRone (BUSPAR) 10 MG tablet Take 1 tablet (10 mg total) by mouth every evening for 3 days, THEN 1 tablet (10 mg total) 2 (two) times daily. 63 tablet 0     No current facility-administered medications on file prior to visit.       Past Surgical History:   Procedure Laterality Date    ARTHROSCOPY, SHOULDER, WITH CAPSULORRHAPHY Left 7/26/2022    Procedure: ARTHROSCOPY,SHOULDER,WITH CAPSULORRHAPHY - [facility];  Surgeon: Jaycee Mcguire MD;  Location: [facility] OR;  Service: Orthopedics;  Laterality: Left;  regional block    REPAIR OF SUPERIOR LABRAL ANTERIOR-TO-POSTERIOR (SLAP) TEAR OF SHOULDER Left 7/26/2022    Procedure: REPAIR, SLAP LESION, SHOULDER;  Surgeon: Jaycee Mcguire MD;  Location: [facility] OR;  Service: Orthopedics;  Laterality: Left;  regional block    WISDOM TOOTH EXTRACTION         Family History   Problem  Relation Age of Onset    Anxiety disorder Mother     Anxiety disorder Father     Pancreatic cancer Maternal Grandmother     Skin cancer Maternal Grandmother     Anxiety disorder Maternal Grandmother        Social History     Socioeconomic History    Marital status: Single    Number of children: 0   Tobacco Use    Smoking status: Never     Passive exposure: Never    Smokeless tobacco: Never   Substance and Sexual Activity    Alcohol use: Yes     Comment: rarely    Drug use: Yes     Types: Marijuana     Comment: smokes nightly    Sexual activity: Yes     Partners: Female   Social History Narrative    Lives with a roommate.  for [employer]. 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       Review of Systems   Constitutional: Negative.   HENT: Negative.     Eyes: Negative.    Cardiovascular: Negative.    Respiratory: Negative.     Endocrine: Negative.    Hematologic/Lymphatic: Negative.    Skin: Negative.    Musculoskeletal:  Positive for joint pain (left knee) and joint swelling. Negative for arthritis, falls, muscle weakness and myalgias.   Neurological: Negative.    Psychiatric/Behavioral: Negative.     Allergic/Immunologic: Negative.        Pain Related Questions  Over the past 3 days, what was your average pain during activity? (I.e. running, jogging, walking, climbing stairs, getting dressed, ect.): 5  Over the past 3 days, what was your highest pain level?: 6  Over the past 3 days, what was your lowest pain level? : 4    Other  How many nights a week are you awakened by your affected body part?: 4  Was the patient's HEIGHT measured or patient reported?: Patient Reported  Was the patient's WEIGHT measured or patient reported?: Measured      Objective:        General: Marco is well-developed, well-nourished, appears stated age, in no acute distress, alert and oriented to time, place and person.     General    Nursing note and vitals reviewed.  Constitutional: He is oriented to person, place, and time.  He appears well-developed and well-nourished. No distress.   HENT:   Head: Normocephalic and atraumatic.   Nose: Nose normal.   Eyes: EOM are normal.   Cardiovascular:  Intact distal pulses.            Pulmonary/Chest: Effort normal. No respiratory distress.   Neurological: He is alert and oriented to person, place, and time.   Psychiatric: He has a normal mood and affect. His behavior is normal. Judgment and thought content normal.           Right Knee Exam     Inspection   Erythema: absent  Scars: absent  Swelling: absent  Effusion: absent  Deformity: absent  Bruising: absent    Tenderness   The patient is experiencing no tenderness.     Range of Motion   Extension:  -5   Flexion:  150     Tests   Meniscus   Patricia:  Medial - negative Lateral - negative  Ligament Examination   Lachman: normal (-1 to 2mm)   PCL-Posterior Drawer: normal (0 to 2mm)     MCL - Valgus: normal (0 to 2mm)  LCL - Varus: normal  Patella   Patellar apprehension: negative  Passive Patellar Tilt: neutral  Patellar Tracking: normal    Other   Sensation: normal    Left Knee Exam     Inspection   Erythema: absent  Scars: absent  Swelling: absent  Effusion: absent  Deformity: absent  Bruising: absent    Tenderness   The patient tender to palpation of the medial joint line.    Range of Motion   Extension:  -5   Flexion:  140     Tests   Meniscus   Patricia:  Medial - positive Lateral - negative  Stability   Lachman: normal (-1 to 2mm)   PCL-Posterior Drawer: normal (0 to 2mm)  MCL - Valgus: normal (0 to 2mm)  LCL - Varus: normal (0 to 2mm)  Patella   Patellar apprehension: negative  Passive Patellar Tilt: neutral  Patellar Tracking: normal    Other   Sensation: normal    Muscle Strength   Right Lower Extremity   Quadriceps:  5/5   Hamstrin/5   Left Lower Extremity   Quadriceps:  5/5   Hamstrin/5     Vascular Exam     Right Pulses  Dorsalis Pedis:      2+  Posterior Tibial:      2+        Left Pulses  Dorsalis Pedis:      2+  Posterior  Tibial:      2+          Imaging:  X-rays of the bilateral knees from 09/21/2023 personally viewed by me on 09/26/2023.  These include weight-bearing AP, PA flexion, lateral, and Merchant views.  Joint spaces are well-maintained.  There is no fracture.  No bony abnormality.      MRI of the left knee from 09/15/2023 from an outside institution was uploaded and personally reviewed by me 09/26/2023.  There was a tear of the posterior horn of the medial meniscus, this is not extend to the root.  There is blunting and fraying of the free edge of the posterior horn and midbody of the lateral meniscus.  The cruciate and collateral ligaments are intact.  Cartilage is preserved in all 3 compartments.      Assessment:     Marco Vallecillo is a 23 y.o. male with left medial meniscal tear  Encounter Diagnosis   Name Primary?    Complex tear of medial meniscus of left knee as current injury, initial encounter Yes          Plan:       The diagnosis and treatment options were discussed at length with the patient and all their questions were answered.  I showed him the x-rays and the MRI and explained the findings to them.      We discussed treatment options for meniscal tears.  Given his age and activity level, as well as for long-term chondral protection, we discussed surgical intervention with arthroscopic medial meniscal repair.  The procedure, as well as the risks, benefits, and rehabilitation protocol were discussed at length and all his questions were answered.    After this discussion, he elected to proceed with surgical intervention.  We will plan this for 11/01/2023.      The risks, benefits and alternatives to surgery were discussed with the patient at great length.  These include, but not limited to, bleeding, infection, vessel/nerve damage, pain, numbness, tingling, complex regional pain syndrome, hardware/surgical failure, need for further surgery, tissue retear, persistent symptoms, progression of arthritis,  failure of repair, DVT, PE, arthritis and death.  Patient states an understanding and wishes to proceed with surgery.   All questions were answered.  No guarantees were implied or stated.  Informed consent was obtained.       All of their questions were answered.  They will call the clinic with any questions or concerns in the interim.    Should the patient's symptoms worsen, persist, or fail to improve they should return for reevaluation and I would be happy to see them back anytime.        Yvan Mayorga M.D.    Please be aware that this note has been generated with the assistance of Andrew voice-to-text.  Please excuse any spelling or grammatical errors.    Thank you for choosing Dr. Yvan Mayorga for your sports medicine care. It is our goal to provide you with exceptional care that will help keep you healthy, active, and get you back in the game.     If you felt that you received exemplary care today, please consider leaving feedback for Dr. Mayorga on [facility] at [email or web address].    Please do not hesitate to reach out to us via email, phone, or MyChart with any questions, concerns, or feedback.

## 2023-10-24 ENCOUNTER — OFFICE VISIT (OUTPATIENT)
Dept: SPORTS MEDICINE | Facility: CLINIC | Age: 23
End: 2023-10-24
Payer: COMMERCIAL

## 2023-10-24 ENCOUNTER — TELEPHONE (OUTPATIENT)
Dept: SPORTS MEDICINE | Facility: CLINIC | Age: 23
End: 2023-10-24
Payer: COMMERCIAL

## 2023-10-24 VITALS — WEIGHT: 190.94 LBS | BODY MASS INDEX: 24.5 KG/M2 | HEIGHT: 74 IN

## 2023-10-24 DIAGNOSIS — S83.232A COMPLEX TEAR OF MEDIAL MENISCUS OF LEFT KNEE AS CURRENT INJURY, INITIAL ENCOUNTER: Primary | ICD-10-CM

## 2023-10-24 PROCEDURE — 99999 PR PBB SHADOW E&M-EST. PATIENT-LVL III: ICD-10-PCS | Mod: PBBFAC,,, | Performed by: ORTHOPAEDIC SURGERY

## 2023-10-24 PROCEDURE — 99499 NO LOS: ICD-10-PCS | Mod: S$GLB,,, | Performed by: ORTHOPAEDIC SURGERY

## 2023-10-24 PROCEDURE — 99999 PR PBB SHADOW E&M-EST. PATIENT-LVL III: CPT | Mod: PBBFAC,,, | Performed by: ORTHOPAEDIC SURGERY

## 2023-10-24 PROCEDURE — 99499 UNLISTED E&M SERVICE: CPT | Mod: S$GLB,,, | Performed by: ORTHOPAEDIC SURGERY

## 2023-10-24 RX ORDER — PROMETHAZINE HYDROCHLORIDE 25 MG/1
25 TABLET ORAL EVERY 6 HOURS PRN
Qty: 30 TABLET | Refills: 0 | Status: SHIPPED | OUTPATIENT
Start: 2023-10-24 | End: 2023-11-29 | Stop reason: ALTCHOICE

## 2023-10-24 RX ORDER — SODIUM CHLORIDE 9 MG/ML
INJECTION, SOLUTION INTRAVENOUS CONTINUOUS
Status: CANCELLED | OUTPATIENT
Start: 2023-10-24

## 2023-10-24 RX ORDER — CELECOXIB 200 MG/1
200 CAPSULE ORAL 2 TIMES DAILY WITH MEALS
Qty: 60 CAPSULE | Refills: 0 | Status: SHIPPED | OUTPATIENT
Start: 2023-10-24

## 2023-10-24 RX ORDER — CEFAZOLIN SODIUM 2 G/50ML
2 SOLUTION INTRAVENOUS
Status: CANCELLED | OUTPATIENT
Start: 2023-10-24

## 2023-10-24 RX ORDER — ASPIRIN 81 MG/1
81 TABLET ORAL DAILY
Qty: 42 TABLET | Refills: 0 | Status: SHIPPED | OUTPATIENT
Start: 2023-10-24 | End: 2023-12-13

## 2023-10-24 RX ORDER — METHOCARBAMOL 500 MG/1
500 TABLET, FILM COATED ORAL 3 TIMES DAILY PRN
Qty: 30 TABLET | Refills: 0 | Status: SHIPPED | OUTPATIENT
Start: 2023-10-24 | End: 2023-11-28 | Stop reason: SDUPTHER

## 2023-10-24 RX ORDER — OXYCODONE HYDROCHLORIDE 5 MG/1
5 TABLET ORAL EVERY 6 HOURS PRN
Qty: 28 TABLET | Refills: 0 | Status: SHIPPED | OUTPATIENT
Start: 2023-10-24 | End: 2023-11-29 | Stop reason: ALTCHOICE

## 2023-10-24 NOTE — H&P
Marco Vallecillo  is here for a completion of his perioperative paperwork. he  Is scheduled to undergo:    Left knee  Arthroscopy  Medial meniscus repair versus partial meniscectomy  Possible microfracture  All other indicated procedures     on 11/01/2023.  He is a healthy individual and does not need clearance for this procedure.     Risks, indications and benefits of the surgical procedure were discussed with the patient. All questions with regard to surgery, rehab, expected return to functional activities, activities of daily living and recreational endeavors were answered to his satisfaction.    Discussed COVID-19 with the patient, they are aware of our current policies and procedures, were given the option of delaying surgery, and they elect to proceed.    Patient was informed and understands the risks of surgery are greater for patients with a current condition or history of heart disease, obesity, clotting disorders, recurrent infections, steroid use, current or past smoking, and factors such as sedentary lifestyle and noncompliance with medications, therapy or follow-up. The degree of the increased risk is hard to estimate with any degree of precision.    Once no other questions were asked, a brief history and physical exam was then performed.    PAST MEDICAL HISTORY:   Past Medical History:   Diagnosis Date    Anxiety disorder, unspecified     Depression     History of psychiatric hospitalization     Hx of psychiatric care     Mild intermittent asthma, uncomplicated     Psychiatric problem     Therapy      PAST SURGICAL HISTORY:   Past Surgical History:   Procedure Laterality Date    ARTHROSCOPY, SHOULDER, WITH CAPSULORRHAPHY Left 7/26/2022    Procedure: ARTHROSCOPY,SHOULDER,WITH CAPSULORRHAPHY - [facility];  Surgeon: Jaycee Mcguire MD;  [facility];  Service: Orthopedics;  Laterality: Left;  regional block    REPAIR OF SUPERIOR LABRAL ANTERIOR-TO-POSTERIOR (SLAP) TEAR OF SHOULDER Left 7/26/2022     Procedure: REPAIR, SLAP LESION, SHOULDER;  Surgeon: Jaycee Mcguire MD;  Location: [facility];  Service: Orthopedics;  Laterality: Left;  regional block    WISDOM TOOTH EXTRACTION       FAMILY HISTORY:   Family History   Problem Relation Age of Onset    Anxiety disorder Mother     Anxiety disorder Father     Pancreatic cancer Maternal Grandmother     Skin cancer Maternal Grandmother     Anxiety disorder Maternal Grandmother      SOCIAL HISTORY:   Social History     Socioeconomic History    Marital status: Single    Number of children: 0   Tobacco Use    Smoking status: Never     Passive exposure: Never    Smokeless tobacco: Never   Substance and Sexual Activity    Alcohol use: Yes     Comment: rarely    Drug use: Yes     Types: Marijuana     Comment: smokes nightly    Sexual activity: Yes     Partners: Female   Social History Narrative    Lives with a roommate.  for [employer]. 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       MEDICATIONS:   Current Outpatient Medications:     aspirin (ECOTRIN) 81 MG EC tablet, Take 1 tablet (81 mg total) by mouth once daily., Disp: 42 tablet, Rfl: 0    busPIRone (BUSPAR) 10 MG tablet, Take 1 tablet (10 mg total) by mouth every evening for 3 days, THEN 1 tablet (10 mg total) 2 (two) times daily., Disp: 63 tablet, Rfl: 0    celecoxib (CELEBREX) 200 MG capsule, Take 1 capsule (200 mg total) by mouth 2 (two) times daily with meals., Disp: 60 capsule, Rfl: 0    methocarbamoL (ROBAXIN) 500 MG Tab, Take 1 tablet (500 mg total) by mouth 3 (three) times daily as needed (muscle spasms)., Disp: 30 tablet, Rfl: 0    oxyCODONE (ROXICODONE) 5 MG immediate release tablet, Take 1 tablet (5 mg total) by mouth every 6 (six) hours as needed for Pain., Disp: 28 tablet, Rfl: 0    promethazine (PHENERGAN) 25 MG tablet, Take 1 tablet (25 mg total) by mouth every 6 (six) hours as needed for Nausea., Disp: 30 tablet, Rfl: 0  ALLERGIES:   Review of patient's allergies indicates:   Allergen Reactions     "Peanut      "Throat swells up"       Review of Systems   Constitution: Negative. Negative for chills, fever and night sweats.   HENT: Negative for congestion and headaches.    Eyes: Negative for blurred vision, left vision loss and right vision loss.   Cardiovascular: Negative for chest pain and syncope.   Respiratory: Negative for cough and shortness of breath.    Endocrine: Negative for polydipsia, polyphagia and polyuria.   Hematologic/Lymphatic: Negative for bleeding problem. Does not bruise/bleed easily.   Skin: Negative for dry skin, itching and rash.   Musculoskeletal: Negative for falls and muscle weakness.   Gastrointestinal: Negative for abdominal pain and bowel incontinence.   Genitourinary: Negative for bladder incontinence and nocturia.   Neurological: Negative for disturbances in coordination, loss of balance and seizures.   Psychiatric/Behavioral: Negative for depression. The patient does not have insomnia.    Allergic/Immunologic: Negative for hives and persistent infections.     PHYSICAL EXAM:  GEN: A&Ox3, WD WN NAD  HEENT: WNL  CHEST: CTAB, no W/R/R  HEART: RRR, no M/R/G   ABD: Soft, NT ND, BS x4 QUADS  MS: Refer to previous note for detailed MS exam  NEURO: CN II-XII intact       The surgical consent was then reviewed with the patient, who agreed with all the contents of the consent form and it was signed. he was instructed to wait for a phone call from the anesthesia department prior to surgery to discuss past medical history, medications, and clearance. Also, informed he may be required to get additional testing per the anesthesia department prior to having surgery.     PHYSICAL THERAPY:  He was also instructed regarding physical therapy and will begin POD # 1-3. He was given a copy of the original prescription to schedule. Another copy of this prescription was also faxed to [facility] in [place], [place].    POST OP CARE:instructions were reviewed including care of the wound and dressing " after surgery and when he can shower.     PAIN MANAGEMENT: Marco Vallecillo was also given a pain management regime, which includes the TENS unit given to him by Meagan Solomon along with the education required for its use. He was also instructed regarding the Polar ice unit that will be in place after surgery and his postoperative pain medications.     PAIN MEDICATION:  Roxicodone (Oxycodone) 5 mg po q 4-6 hours prn pain   Phenergan 25 mg one p.o. q.4-6 hours prn nausea and vomiting.  Celebrex 200 mg BID with meals  Robaxin 500mg q 8 hours prn pain  Aspirin 81mg daily x 6 weeks for DVT prophylaxis starting on the evening after surgery.    Post op meds to be delivered bedside prior to discharge. Deliver to family if patient is in surgery at 5pm.   Patient denies history of seizures.     Patient will also use bilateral TEDs on lower extremities, SCDs during surgery, and early ambulation post-op. If the patient was previously taking 81mg baby aspirin, they were told to not take it will using the above stated aspirin and to restart the 81mg aspirin after completion of the aspirin dose.       Patient was also told to buy over the counter Prilosec medication and take it once daily for GI protection as long as they are taking NSAIDs or Aspirin.    DVT prophylaxis was discussed with the patient today including risk factors for developing DVTs and history of DVTs. The patient was asked if any specific recommendations were given from the doctor/s that did pre-operative surgical clearance.      The patient was told that narcotic pain medications may make them drowsy and instructions were given to not sign legal documents, drive or operate heavy machinery, cars, or equipment while under the influence of narcotic medications.     My supervising physician Yvan Mayorga MD was also present during the appointment.  He discussed with the patient all the potential complications, risks, and benefits of their upcoming surgery.    As  there were no other questions to be asked, he was given my business card along with Yvan Mayorga's, MD business card if he has any questions or concerns prior to surgery or in the postop period.

## 2023-10-24 NOTE — H&P (VIEW-ONLY)
Marco Vallecillo  is here for a completion of his perioperative paperwork. he  Is scheduled to undergo:    Left knee  Arthroscopy  Medial meniscus repair versus partial meniscectomy  Possible microfracture  All other indicated procedures     on 11/01/2023.  He is a healthy individual and does not need clearance for this procedure.     Risks, indications and benefits of the surgical procedure were discussed with the patient. All questions with regard to surgery, rehab, expected return to functional activities, activities of daily living and recreational endeavors were answered to his satisfaction.    Discussed COVID-19 with the patient, they are aware of our current policies and procedures, were given the option of delaying surgery, and they elect to proceed.    Patient was informed and understands the risks of surgery are greater for patients with a current condition or history of heart disease, obesity, clotting disorders, recurrent infections, steroid use, current or past smoking, and factors such as sedentary lifestyle and noncompliance with medications, therapy or follow-up. The degree of the increased risk is hard to estimate with any degree of precision.    Once no other questions were asked, a brief history and physical exam was then performed.    PAST MEDICAL HISTORY:   Past Medical History:   Diagnosis Date    Anxiety disorder, unspecified     Depression     History of psychiatric hospitalization     Hx of psychiatric care     Mild intermittent asthma, uncomplicated     Psychiatric problem     Therapy      PAST SURGICAL HISTORY:   Past Surgical History:   Procedure Laterality Date    ARTHROSCOPY, SHOULDER, WITH CAPSULORRHAPHY Left 7/26/2022    Procedure: ARTHROSCOPY,SHOULDER,WITH CAPSULORRHAPHY - [facility];  Surgeon: Jaycee Mcguire MD;  Location: [facility];  Service: Orthopedics;  Laterality: Left;  regional block    REPAIR OF SUPERIOR LABRAL ANTERIOR-TO-POSTERIOR (SLAP) TEAR OF SHOULDER Left 7/26/2022     Procedure: REPAIR, SLAP LESION, SHOULDER;  Surgeon: Jaycee Mcguire MD;  Location: [facility];  Service: Orthopedics;  Laterality: Left;  regional block    WISDOM TOOTH EXTRACTION       FAMILY HISTORY:   Family History   Problem Relation Age of Onset    Anxiety disorder Mother     Anxiety disorder Father     Pancreatic cancer Maternal Grandmother     Skin cancer Maternal Grandmother     Anxiety disorder Maternal Grandmother      SOCIAL HISTORY:   Social History     Socioeconomic History    Marital status: Single    Number of children: 0   Tobacco Use    Smoking status: Never     Passive exposure: Never    Smokeless tobacco: Never   Substance and Sexual Activity    Alcohol use: Yes     Comment: rarely    Drug use: Yes     Types: Marijuana     Comment: smokes nightly    Sexual activity: Yes     Partners: Female   Social History Narrative    Lives with a roommate.  for [employer]. 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       MEDICATIONS:   Current Outpatient Medications:     aspirin (ECOTRIN) 81 MG EC tablet, Take 1 tablet (81 mg total) by mouth once daily., Disp: 42 tablet, Rfl: 0    busPIRone (BUSPAR) 10 MG tablet, Take 1 tablet (10 mg total) by mouth every evening for 3 days, THEN 1 tablet (10 mg total) 2 (two) times daily., Disp: 63 tablet, Rfl: 0    celecoxib (CELEBREX) 200 MG capsule, Take 1 capsule (200 mg total) by mouth 2 (two) times daily with meals., Disp: 60 capsule, Rfl: 0    methocarbamoL (ROBAXIN) 500 MG Tab, Take 1 tablet (500 mg total) by mouth 3 (three) times daily as needed (muscle spasms)., Disp: 30 tablet, Rfl: 0    oxyCODONE (ROXICODONE) 5 MG immediate release tablet, Take 1 tablet (5 mg total) by mouth every 6 (six) hours as needed for Pain., Disp: 28 tablet, Rfl: 0    promethazine (PHENERGAN) 25 MG tablet, Take 1 tablet (25 mg total) by mouth every 6 (six) hours as needed for Nausea., Disp: 30 tablet, Rfl: 0  ALLERGIES:   Review of patient's allergies indicates:   Allergen Reactions     "Peanut      "Throat swells up"       Review of Systems   Constitution: Negative. Negative for chills, fever and night sweats.   HENT: Negative for congestion and headaches.    Eyes: Negative for blurred vision, left vision loss and right vision loss.   Cardiovascular: Negative for chest pain and syncope.   Respiratory: Negative for cough and shortness of breath.    Endocrine: Negative for polydipsia, polyphagia and polyuria.   Hematologic/Lymphatic: Negative for bleeding problem. Does not bruise/bleed easily.   Skin: Negative for dry skin, itching and rash.   Musculoskeletal: Negative for falls and muscle weakness.   Gastrointestinal: Negative for abdominal pain and bowel incontinence.   Genitourinary: Negative for bladder incontinence and nocturia.   Neurological: Negative for disturbances in coordination, loss of balance and seizures.   Psychiatric/Behavioral: Negative for depression. The patient does not have insomnia.    Allergic/Immunologic: Negative for hives and persistent infections.     PHYSICAL EXAM:  GEN: A&Ox3, WD WN NAD  HEENT: WNL  CHEST: CTAB, no W/R/R  HEART: RRR, no M/R/G   ABD: Soft, NT ND, BS x4 QUADS  MS: Refer to previous note for detailed MS exam  NEURO: CN II-XII intact       The surgical consent was then reviewed with the patient, who agreed with all the contents of the consent form and it was signed. he was instructed to wait for a phone call from the anesthesia department prior to surgery to discuss past medical history, medications, and clearance. Also, informed he may be required to get additional testing per the anesthesia department prior to having surgery.     PHYSICAL THERAPY:  He was also instructed regarding physical therapy and will begin POD # 1-3. He was given a copy of the original prescription to schedule. Another copy of this prescription was also faxed to [facility] in [place], [place].    POST OP CARE:instructions were reviewed including care of the wound and dressing " after surgery and when he can shower.     PAIN MANAGEMENT: Marco Vallecillo was also given a pain management regime, which includes the TENS unit given to him by Meagan Solomon along with the education required for its use. He was also instructed regarding the Polar ice unit that will be in place after surgery and his postoperative pain medications.     PAIN MEDICATION:  Roxicodone (Oxycodone) 5 mg po q 4-6 hours prn pain   Phenergan 25 mg one p.o. q.4-6 hours prn nausea and vomiting.  Celebrex 200 mg BID with meals  Robaxin 500mg q 8 hours prn pain  Aspirin 81mg daily x 6 weeks for DVT prophylaxis starting on the evening after surgery.    Post op meds to be delivered bedside prior to discharge. Deliver to family if patient is in surgery at 5pm.   Patient denies history of seizures.     Patient will also use bilateral TEDs on lower extremities, SCDs during surgery, and early ambulation post-op. If the patient was previously taking 81mg baby aspirin, they were told to not take it will using the above stated aspirin and to restart the 81mg aspirin after completion of the aspirin dose.       Patient was also told to buy over the counter Prilosec medication and take it once daily for GI protection as long as they are taking NSAIDs or Aspirin.    DVT prophylaxis was discussed with the patient today including risk factors for developing DVTs and history of DVTs. The patient was asked if any specific recommendations were given from the doctor/s that did pre-operative surgical clearance.      The patient was told that narcotic pain medications may make them drowsy and instructions were given to not sign legal documents, drive or operate heavy machinery, cars, or equipment while under the influence of narcotic medications.     My supervising physician Yvan Mayorga MD was also present during the appointment.  He discussed with the patient all the potential complications, risks, and benefits of their upcoming surgery.    As  there were no other questions to be asked, he was given my business card along with Yvan Mayorga's, MD business card if he has any questions or concerns prior to surgery or in the postop period.

## 2023-10-24 NOTE — TELEPHONE ENCOUNTER
Left VM for patient to go ahead and come to his appointment and check in when he gets here.     ----- Message from Rosa M Humphries sent at 10/24/2023  9:42 AM CDT -----  [facility] in [facility] know he is on the way to his pre opt appt     Confirmed patient's contact info below:  Contact Name: Marco Gould Avel  Phone Number: [phone]

## 2023-10-27 ENCOUNTER — PATIENT MESSAGE (OUTPATIENT)
Dept: PREADMISSION TESTING | Facility: HOSPITAL | Age: 23
End: 2023-10-27
Payer: COMMERCIAL

## 2023-10-27 NOTE — ANESTHESIA PAT ROS NOTE
10/27/2023  Marco Vallecillo is a 23 y.o., male.      Pre-op Assessment    I have reviewed the Patient Summary Reports.       I have reviewed the Medications.     Review of Systems  Anesthesia Hx:  No problems with previous Anesthesia   History of prior surgery of interest to airway management or planning:  Previous anesthesia: General, Nerve Block 7/26/2022  Left Shoulder Arthroscopy, Repair of SLAP Tear with general anesthesia.  Procedure performed at an [facility].     for 7/26/2022 Left Shoulder Arthroscopy, Repair of SLAP Tear.  Procedure performed at an [facility].  Airway issues documented on chart review include mask, easy, GETA, easy direct laryngoscopy , view on direct laryngoscopy Grade I      Denies Personal Hx of Anesthesia complications.                    Social:  Non-Smoker, Social Alcohol Use       Hematology/Oncology:  Hematology Normal   Oncology Normal                                   EENT/Dental:   H/O [place] tooth Extraction          Cardiovascular:  Cardiovascular Normal Exercise tolerance: good        Denies Dysrhythmias.         Denies HALL.                            Pulmonary:    Asthma mild  Denies Shortness of breath.   Mild intermittent asthma, no meds or inhalers               Renal/:  Renal/ Normal  Denies Chronic Renal Disease.                Hepatic/GI:  Hepatic/GI Normal     Denies GERD. Denies Liver Disease.            Musculoskeletal:     Complex tear of medial meniscus of left knee,  H/O Left Shoulder Arthroscopy, Capsulorrhaphy, Repair of SLAP Tear            Neurological:  Neurology Normal      Denies Headaches. Denies Seizures.                                Endocrine:  Endocrine Normal Denies Diabetes. Denies Hypothyroidism.          Psych:  Psychiatric History anxiety depression H/O Psychiatric hospitalization              Past Medical History:    Diagnosis Date    Anxiety disorder, unspecified     Depression     History of psychiatric hospitalization     Hx of psychiatric care     Mild intermittent asthma, uncomplicated     Psychiatric problem     Therapy      Past Surgical History:   Procedure Laterality Date    ARTHROSCOPY, SHOULDER, WITH CAPSULORRHAPHY Left 7/26/2022    Procedure: ARTHROSCOPY,SHOULDER,WITH CAPSULORRHAPHY - [facility];  Surgeon: Jaycee Mcguire MD;  Location: [facility];  Service: Orthopedics;  Laterality: Left;  regional block    REPAIR OF SUPERIOR LABRAL ANTERIOR-TO-POSTERIOR (SLAP) TEAR OF SHOULDER Left 7/26/2022    Procedure: REPAIR, SLAP LESION, SHOULDER;  Surgeon: Jaycee Mcguire MD;  Location: [facility] OR;  Service: Orthopedics;  Laterality: Left;  regional block    WISDOM TOOTH EXTRACTION         Anesthesia Assessment: Preoperative EQUATION    Planned Procedure: Procedure(s) (LRB):  ARTHROSCOPY,KNEE,WITH MENISCUS REPAIR (Left)  Requested Anesthesia Type:Regional  Surgeon: Yvan aMyorga MD  Service: Orthopedics  Known or anticipated Date of Surgery:11/1/2023    Surgeon notes: reviewed    Electronic QUestionnaire Assessment completed via nurse interview with patient.        Triage considerations:     The patient has no apparent active cardiac condition (No unstable coronary Syndrome such as severe unstable angina or recent [<1 month] myocardial infarction, decompensated CHF, severe valvular   disease or significant arrhythmia)    Previous anesthesia records:GETA, Nerve block for post-op pain, Easy airway, Easy intubation, and No problems    Last PCP note: 3-6 months ago , within [facility]       Other important co-morbidities:  No co-morbidities noted                 Instructions given. (See in Nurse's note)      Ht: 6'2  Wt: 190 lb  BMI: 24.51  Vaccinated

## 2023-10-31 ENCOUNTER — ANESTHESIA EVENT (OUTPATIENT)
Dept: SURGERY | Facility: HOSPITAL | Age: 23
End: 2023-10-31
Payer: COMMERCIAL

## 2023-10-31 ENCOUNTER — TELEPHONE (OUTPATIENT)
Dept: SPORTS MEDICINE | Facility: CLINIC | Age: 23
End: 2023-10-31
Payer: COMMERCIAL

## 2023-10-31 ENCOUNTER — PATIENT MESSAGE (OUTPATIENT)
Dept: SPORTS MEDICINE | Facility: CLINIC | Age: 23
End: 2023-10-31
Payer: COMMERCIAL

## 2023-11-01 ENCOUNTER — ANESTHESIA (OUTPATIENT)
Dept: SURGERY | Facility: HOSPITAL | Age: 23
End: 2023-11-01
Payer: COMMERCIAL

## 2023-11-01 ENCOUNTER — TELEPHONE (OUTPATIENT)
Dept: SPORTS MEDICINE | Facility: CLINIC | Age: 23
End: 2023-11-01
Payer: COMMERCIAL

## 2023-11-01 ENCOUNTER — HOSPITAL ENCOUNTER (OUTPATIENT)
Facility: HOSPITAL | Age: 23
Discharge: HOME | End: 2023-11-01
Attending: STUDENT IN AN ORGANIZED HEALTH CARE EDUCATION/TRAINING PROGRAM | Admitting: STUDENT IN AN ORGANIZED HEALTH CARE EDUCATION/TRAINING PROGRAM
Payer: COMMERCIAL

## 2023-11-01 VITALS
TEMPERATURE: 98 F | HEIGHT: 74 IN | SYSTOLIC BLOOD PRESSURE: 138 MMHG | DIASTOLIC BLOOD PRESSURE: 78 MMHG | BODY MASS INDEX: 24.38 KG/M2 | WEIGHT: 190 LBS | RESPIRATION RATE: 16 BRPM | HEART RATE: 72 BPM | OXYGEN SATURATION: 98 %

## 2023-11-01 DIAGNOSIS — S83.232A COMPLEX TEAR OF MEDIAL MENISCUS OF LEFT KNEE AS CURRENT INJURY, INITIAL ENCOUNTER: ICD-10-CM

## 2023-11-01 PROCEDURE — 64448 ADDUCTOR CANAL CATHETER: ICD-10-PCS | Mod: 59,LT,, | Performed by: ANESTHESIOLOGY

## 2023-11-01 PROCEDURE — 25000003 PHARM REV CODE 250: Performed by: ORTHOPAEDIC SURGERY

## 2023-11-01 PROCEDURE — 25000003 PHARM REV CODE 250: Performed by: NURSE ANESTHETIST, CERTIFIED REGISTERED

## 2023-11-01 PROCEDURE — 37000008 HC ANESTHESIA 1ST 15 MINUTES: Performed by: STUDENT IN AN ORGANIZED HEALTH CARE EDUCATION/TRAINING PROGRAM

## 2023-11-01 PROCEDURE — 63600175 PHARM REV CODE 636 W HCPCS: Performed by: ANESTHESIOLOGY

## 2023-11-01 PROCEDURE — 71000033 HC RECOVERY, INTIAL HOUR: Performed by: STUDENT IN AN ORGANIZED HEALTH CARE EDUCATION/TRAINING PROGRAM

## 2023-11-01 PROCEDURE — 36000711: Performed by: STUDENT IN AN ORGANIZED HEALTH CARE EDUCATION/TRAINING PROGRAM

## 2023-11-01 PROCEDURE — C1713 ANCHOR/SCREW BN/BN,TIS/BN: HCPCS | Performed by: STUDENT IN AN ORGANIZED HEALTH CARE EDUCATION/TRAINING PROGRAM

## 2023-11-01 PROCEDURE — 25000003 PHARM REV CODE 250: Performed by: ANESTHESIOLOGY

## 2023-11-01 PROCEDURE — D9220A PRA ANESTHESIA: ICD-10-PCS | Mod: ANES,,, | Performed by: ANESTHESIOLOGY

## 2023-11-01 PROCEDURE — 27201423 OPTIME MED/SURG SUP & DEVICES STERILE SUPPLY: Performed by: STUDENT IN AN ORGANIZED HEALTH CARE EDUCATION/TRAINING PROGRAM

## 2023-11-01 PROCEDURE — 25000003 PHARM REV CODE 250: Performed by: NURSE PRACTITIONER

## 2023-11-01 PROCEDURE — 71000039 HC RECOVERY, EACH ADD'L HOUR: Performed by: STUDENT IN AN ORGANIZED HEALTH CARE EDUCATION/TRAINING PROGRAM

## 2023-11-01 PROCEDURE — 63600175 PHARM REV CODE 636 W HCPCS: Performed by: NURSE ANESTHETIST, CERTIFIED REGISTERED

## 2023-11-01 PROCEDURE — D9220A PRA ANESTHESIA: ICD-10-PCS | Mod: CRNA,,, | Performed by: NURSE ANESTHETIST, CERTIFIED REGISTERED

## 2023-11-01 PROCEDURE — 63600175 PHARM REV CODE 636 W HCPCS: Performed by: STUDENT IN AN ORGANIZED HEALTH CARE EDUCATION/TRAINING PROGRAM

## 2023-11-01 PROCEDURE — 29882 PR KNEE SCOPE,MED OR LAT MENIS REPAIR: ICD-10-PCS | Mod: LT,,, | Performed by: STUDENT IN AN ORGANIZED HEALTH CARE EDUCATION/TRAINING PROGRAM

## 2023-11-01 PROCEDURE — 64448 NJX AA&/STRD FEM NRV NFS IMG: CPT | Performed by: ANESTHESIOLOGY

## 2023-11-01 PROCEDURE — D9220A PRA ANESTHESIA: Mod: CRNA,,, | Performed by: NURSE ANESTHETIST, CERTIFIED REGISTERED

## 2023-11-01 PROCEDURE — 63600175 PHARM REV CODE 636 W HCPCS: Performed by: NURSE PRACTITIONER

## 2023-11-01 PROCEDURE — 37000009 HC ANESTHESIA EA ADD 15 MINS: Performed by: STUDENT IN AN ORGANIZED HEALTH CARE EDUCATION/TRAINING PROGRAM

## 2023-11-01 PROCEDURE — 29882 ARTHRS KNE SRG MNISC RPR M/L: CPT | Mod: LT,,, | Performed by: STUDENT IN AN ORGANIZED HEALTH CARE EDUCATION/TRAINING PROGRAM

## 2023-11-01 PROCEDURE — 94761 N-INVAS EAR/PLS OXIMETRY MLT: CPT

## 2023-11-01 PROCEDURE — 99900035 HC TECH TIME PER 15 MIN (STAT)

## 2023-11-01 PROCEDURE — 71000015 HC POSTOP RECOV 1ST HR: Performed by: STUDENT IN AN ORGANIZED HEALTH CARE EDUCATION/TRAINING PROGRAM

## 2023-11-01 PROCEDURE — 63600175 PHARM REV CODE 636 W HCPCS: Performed by: ORTHOPAEDIC SURGERY

## 2023-11-01 PROCEDURE — 36000710: Performed by: STUDENT IN AN ORGANIZED HEALTH CARE EDUCATION/TRAINING PROGRAM

## 2023-11-01 PROCEDURE — D9220A PRA ANESTHESIA: Mod: ANES,,, | Performed by: ANESTHESIOLOGY

## 2023-11-01 DEVICE — ANCHOR SUT FIBERSTITCH 2-0 CRV: Type: IMPLANTABLE DEVICE | Site: KNEE | Status: FUNCTIONAL

## 2023-11-01 RX ORDER — SODIUM CHLORIDE 9 MG/ML
INJECTION, SOLUTION INTRAVENOUS CONTINUOUS
Status: DISCONTINUED | OUTPATIENT
Start: 2023-11-01 | End: 2023-11-01 | Stop reason: HOSPADM

## 2023-11-01 RX ORDER — HYDROCODONE BITARTRATE AND ACETAMINOPHEN 5; 325 MG/1; MG/1
1 TABLET ORAL EVERY 4 HOURS PRN
Status: DISCONTINUED | OUTPATIENT
Start: 2023-11-01 | End: 2023-11-01 | Stop reason: HOSPADM

## 2023-11-01 RX ORDER — METOCLOPRAMIDE HYDROCHLORIDE 5 MG/ML
5 INJECTION INTRAMUSCULAR; INTRAVENOUS EVERY 6 HOURS PRN
Status: DISCONTINUED | OUTPATIENT
Start: 2023-11-01 | End: 2023-11-01 | Stop reason: HOSPADM

## 2023-11-01 RX ORDER — ROPIVACAINE HYDROCHLORIDE 5 MG/ML
INJECTION, SOLUTION EPIDURAL; INFILTRATION; PERINEURAL
Status: DISCONTINUED | OUTPATIENT
Start: 2023-11-01 | End: 2023-11-01

## 2023-11-01 RX ORDER — MIDAZOLAM HYDROCHLORIDE 1 MG/ML
0.5 INJECTION INTRAMUSCULAR; INTRAVENOUS
Status: DISCONTINUED | OUTPATIENT
Start: 2023-11-01 | End: 2023-11-01 | Stop reason: HOSPADM

## 2023-11-01 RX ORDER — KETAMINE HCL IN 0.9 % NACL 50 MG/5 ML
SYRINGE (ML) INTRAVENOUS
Status: DISCONTINUED | OUTPATIENT
Start: 2023-11-01 | End: 2023-11-01

## 2023-11-01 RX ORDER — ACETAMINOPHEN 500 MG
1000 TABLET ORAL ONCE
Status: COMPLETED | OUTPATIENT
Start: 2023-11-01 | End: 2023-11-01

## 2023-11-01 RX ORDER — CELECOXIB 200 MG/1
400 CAPSULE ORAL ONCE
Status: COMPLETED | OUTPATIENT
Start: 2023-11-01 | End: 2023-11-01

## 2023-11-01 RX ORDER — ONDANSETRON 2 MG/ML
4 INJECTION INTRAMUSCULAR; INTRAVENOUS ONCE AS NEEDED
Status: DISCONTINUED | OUTPATIENT
Start: 2023-11-01 | End: 2023-11-01 | Stop reason: HOSPADM

## 2023-11-01 RX ORDER — ACETAMINOPHEN 325 MG/1
650 TABLET ORAL EVERY 4 HOURS PRN
Status: DISCONTINUED | OUTPATIENT
Start: 2023-11-01 | End: 2023-11-01 | Stop reason: HOSPADM

## 2023-11-01 RX ORDER — LIDOCAINE HYDROCHLORIDE 20 MG/ML
INJECTION INTRAVENOUS
Status: DISCONTINUED | OUTPATIENT
Start: 2023-11-01 | End: 2023-11-01

## 2023-11-01 RX ORDER — ONDANSETRON 2 MG/ML
INJECTION INTRAMUSCULAR; INTRAVENOUS
Status: DISCONTINUED | OUTPATIENT
Start: 2023-11-01 | End: 2023-11-01

## 2023-11-01 RX ORDER — SODIUM CHLORIDE 0.9 % (FLUSH) 0.9 %
3 SYRINGE (ML) INJECTION
Status: DISCONTINUED | OUTPATIENT
Start: 2023-11-01 | End: 2023-11-01 | Stop reason: HOSPADM

## 2023-11-01 RX ORDER — EPINEPHRINE CONVENIENCE KIT 1 MG/ML(1)
KIT INTRAMUSCULAR; SUBCUTANEOUS
Status: DISCONTINUED | OUTPATIENT
Start: 2023-11-01 | End: 2023-11-01 | Stop reason: HOSPADM

## 2023-11-01 RX ORDER — OXYCODONE HYDROCHLORIDE 5 MG/1
5 TABLET ORAL EVERY 4 HOURS PRN
Status: DISCONTINUED | OUTPATIENT
Start: 2023-11-01 | End: 2023-11-01 | Stop reason: HOSPADM

## 2023-11-01 RX ORDER — HYDROMORPHONE HYDROCHLORIDE 1 MG/ML
0.2 INJECTION, SOLUTION INTRAMUSCULAR; INTRAVENOUS; SUBCUTANEOUS EVERY 5 MIN PRN
Status: DISCONTINUED | OUTPATIENT
Start: 2023-11-01 | End: 2023-11-01 | Stop reason: HOSPADM

## 2023-11-01 RX ORDER — ACETAMINOPHEN 500 MG
500 TABLET ORAL
COMMUNITY

## 2023-11-01 RX ORDER — MIDAZOLAM HYDROCHLORIDE 1 MG/ML
INJECTION, SOLUTION INTRAMUSCULAR; INTRAVENOUS
Status: DISCONTINUED | OUTPATIENT
Start: 2023-11-01 | End: 2023-11-01

## 2023-11-01 RX ORDER — METHOCARBAMOL 500 MG/1
1000 TABLET, FILM COATED ORAL ONCE AS NEEDED
Status: COMPLETED | OUTPATIENT
Start: 2023-11-01 | End: 2023-11-01

## 2023-11-01 RX ORDER — DEXAMETHASONE SODIUM PHOSPHATE 4 MG/ML
INJECTION, SOLUTION INTRA-ARTICULAR; INTRALESIONAL; INTRAMUSCULAR; INTRAVENOUS; SOFT TISSUE
Status: DISCONTINUED | OUTPATIENT
Start: 2023-11-01 | End: 2023-11-01

## 2023-11-01 RX ORDER — ONDANSETRON 2 MG/ML
4 INJECTION INTRAMUSCULAR; INTRAVENOUS EVERY 12 HOURS PRN
Status: DISCONTINUED | OUTPATIENT
Start: 2023-11-01 | End: 2023-11-01 | Stop reason: HOSPADM

## 2023-11-01 RX ORDER — PROPOFOL 10 MG/ML
VIAL (ML) INTRAVENOUS
Status: DISCONTINUED | OUTPATIENT
Start: 2023-11-01 | End: 2023-11-01

## 2023-11-01 RX ORDER — OXYCODONE HYDROCHLORIDE 5 MG/1
10 TABLET ORAL EVERY 4 HOURS PRN
Status: DISCONTINUED | OUTPATIENT
Start: 2023-11-01 | End: 2023-11-01 | Stop reason: HOSPADM

## 2023-11-01 RX ORDER — FENTANYL CITRATE 50 UG/ML
INJECTION, SOLUTION INTRAMUSCULAR; INTRAVENOUS
Status: DISCONTINUED | OUTPATIENT
Start: 2023-11-01 | End: 2023-11-01

## 2023-11-01 RX ORDER — ROPIVACAINE HYDROCHLORIDE 2 MG/ML
INJECTION, SOLUTION EPIDURAL; INFILTRATION; PERINEURAL CONTINUOUS
Status: DISCONTINUED | OUTPATIENT
Start: 2023-11-01 | End: 2023-11-01 | Stop reason: HOSPADM

## 2023-11-01 RX ORDER — FENTANYL CITRATE 50 UG/ML
25 INJECTION, SOLUTION INTRAMUSCULAR; INTRAVENOUS EVERY 5 MIN PRN
Status: DISCONTINUED | OUTPATIENT
Start: 2023-11-01 | End: 2023-11-01 | Stop reason: HOSPADM

## 2023-11-01 RX ORDER — FAMOTIDINE 10 MG/ML
INJECTION INTRAVENOUS
Status: DISCONTINUED | OUTPATIENT
Start: 2023-11-01 | End: 2023-11-01

## 2023-11-01 RX ADMIN — Medication 30 MG: at 09:11

## 2023-11-01 RX ADMIN — HYDROMORPHONE HYDROCHLORIDE 0.2 MG: 1 INJECTION, SOLUTION INTRAMUSCULAR; INTRAVENOUS; SUBCUTANEOUS at 11:11

## 2023-11-01 RX ADMIN — OXYCODONE HYDROCHLORIDE 5 MG: 5 TABLET ORAL at 11:11

## 2023-11-01 RX ADMIN — ROPIVACAINE HYDROCHLORIDE 7.5 ML: 5 INJECTION EPIDURAL; INFILTRATION; PERINEURAL at 08:11

## 2023-11-01 RX ADMIN — HYDROMORPHONE HYDROCHLORIDE 0.2 MG: 1 INJECTION, SOLUTION INTRAMUSCULAR; INTRAVENOUS; SUBCUTANEOUS at 12:11

## 2023-11-01 RX ADMIN — MIDAZOLAM HYDROCHLORIDE 2 MG: 1 INJECTION, SOLUTION INTRAMUSCULAR; INTRAVENOUS at 09:11

## 2023-11-01 RX ADMIN — FENTANYL CITRATE 100 MCG: 50 INJECTION, SOLUTION INTRAMUSCULAR; INTRAVENOUS at 09:11

## 2023-11-01 RX ADMIN — DEXAMETHASONE SODIUM PHOSPHATE 8 MG: 4 INJECTION, SOLUTION INTRAMUSCULAR; INTRAVENOUS at 10:11

## 2023-11-01 RX ADMIN — METHOCARBAMOL 1000 MG: 500 TABLET ORAL at 11:11

## 2023-11-01 RX ADMIN — SODIUM CHLORIDE: 0.9 INJECTION, SOLUTION INTRAVENOUS at 08:11

## 2023-11-01 RX ADMIN — SODIUM CHLORIDE, SODIUM GLUCONATE, SODIUM ACETATE, POTASSIUM CHLORIDE, MAGNESIUM CHLORIDE, SODIUM PHOSPHATE, DIBASIC, AND POTASSIUM PHOSPHATE: .53; .5; .37; .037; .03; .012; .00082 INJECTION, SOLUTION INTRAVENOUS at 10:11

## 2023-11-01 RX ADMIN — Medication: at 11:11

## 2023-11-01 RX ADMIN — ONDANSETRON 4 MG: 2 INJECTION INTRAMUSCULAR; INTRAVENOUS at 10:11

## 2023-11-01 RX ADMIN — PROPOFOL 200 MG: 10 INJECTION, EMULSION INTRAVENOUS at 09:11

## 2023-11-01 RX ADMIN — MIDAZOLAM HYDROCHLORIDE 1 MG: 1 INJECTION, SOLUTION INTRAMUSCULAR; INTRAVENOUS at 09:11

## 2023-11-01 RX ADMIN — FAMOTIDINE 20 MG: 10 INJECTION, SOLUTION INTRAVENOUS at 10:11

## 2023-11-01 RX ADMIN — LIDOCAINE HYDROCHLORIDE 75 MG: 20 INJECTION INTRAVENOUS at 09:11

## 2023-11-01 RX ADMIN — ACETAMINOPHEN 1000 MG: 500 TABLET ORAL at 08:11

## 2023-11-01 RX ADMIN — FENTANYL CITRATE 100 MCG: 50 INJECTION INTRAMUSCULAR; INTRAVENOUS at 09:11

## 2023-11-01 RX ADMIN — CELECOXIB 400 MG: 200 CAPSULE ORAL at 08:11

## 2023-11-01 RX ADMIN — CEFAZOLIN 2 G: 2 INJECTION, POWDER, FOR SOLUTION INTRAMUSCULAR; INTRAVENOUS at 10:11

## 2023-11-01 NOTE — ANESTHESIA POSTPROCEDURE EVALUATION
Anesthesia Post Evaluation    Patient: Marco Vallecillo    Procedure(s) Performed: Procedure(s) (LRB):  ARTHROSCOPY,KNEE,WITH MENISCUS REPAIR (Left)    Final Anesthesia Type: general      Patient location during evaluation: PACU  Patient participation: Yes- Able to Participate  Level of consciousness: awake and alert and oriented  Post-procedure vital signs: reviewed and stable  Pain management: adequate  Airway patency: patent    PONV status at discharge: No PONV  Anesthetic complications: no      Cardiovascular status: blood pressure returned to baseline and hemodynamically stable  Respiratory status: unassisted, room air and spontaneous ventilation  Hydration status: euvolemic  Follow-up not needed.          Vitals Value Taken Time   /78 11/01/23 1247   Temp 36.4 °C (97.6 °F) 11/01/23 1100   Pulse 77 11/01/23 1250   Resp 35 11/01/23 1250   SpO2 98 % 11/01/23 1250   Vitals shown include unvalidated device data.      Event Time   Out of Recovery 12:30:00         Pain/Zackary Score: Pain Rating Prior to Med Admin: 2 (11/1/2023 12:04 PM)  Pain Rating Post Med Admin: 0 (11/1/2023  9:20 AM)

## 2023-11-01 NOTE — ANESTHESIA PROCEDURE NOTES
Adductor Canal Catheter    Patient location during procedure: pre-op   Block not for primary anesthetic.  Reason for block: at surgeon's request and post-op pain management   Post-op Pain Location: L knee pain   Start time: 11/1/2023 8:15 AM  Timeout: 11/1/2023 8:15 AM   End time: 11/1/2023 8:20 AM    Staffing  Authorizing Provider: Beto Abad MD  Performing Provider: Beto Abad MD    Staffing  Performed by: Beto Abad MD  Authorized by: Beto Abad MD    Preanesthetic Checklist  Completed: patient identified, IV checked, site marked, risks and benefits discussed, surgical consent, monitors and equipment checked, pre-op evaluation and timeout performed  Peripheral Block  Patient position: supine  Prep: ChloraPrep and site prepped and draped  Patient monitoring: heart rate, cardiac monitor, continuous pulse ox, continuous capnometry and frequent blood pressure checks  Block type: adductor canal  Laterality: left  Injection technique: continuous  Needle  Needle type: Tuohy   Needle gauge: 17 G  Needle length: 3.5 in  Needle localization: anatomical landmarks and ultrasound guidance  Catheter type: spring wound  Catheter size: 19 G  Test dose: lidocaine 1.5% with Epi 1-to-200,000 and negative   -ultrasound image captured on disc.  Assessment  Injection assessment: negative aspiration, negative parasthesia and local visualized surrounding nerve  Paresthesia pain: none  Heart rate change: no  Slow fractionated injection: yes  Pain Tolerance: comfortable throughout block and no complaints  Medications:    Medications: ropivacaine (NAROPIN) injection 0.5% - Perineural   7.5 mL - 11/1/2023 8:20:00 AM    Additional Notes  Diluted 7.5 cc of 0.5% Ropivacaine to 15 cc of 0.25% Ropivacaine.  Added 1:300k epi.    VSS.  [facility] RN monitoring vitals throughout procedure.  Patient tolerated procedure well.

## 2023-11-01 NOTE — OP NOTE
DATE OF PROCEDURE: 11/01/2023    SURGEON: Yvan Mayorga MD    ASSISTANT:  Christiano YAO     PREOPERATIVE DIAGNOSIS:    Left medial meniscus tear    POSTOPERATIVE DIAGNOSIS:   Left medial meniscus tear      PROCEDURE PERFORMED:   Left knee arthroscopic medial meniscus repair      ANESTHESIA: General with adductor canal block and catheter    BLOOD LOSS: Minimal.     IMPLANTS:  Implant Name Type Inv. Item Serial No.  Lot No. LRB No. Used Action   ANCHOR SUT FIBERSTITCH 2-0 CRV - [id number]  ANCHOR SUT FIBERSTITCH 2-0 CRV  [id number] Left 1 Implanted   ANCHOR SUT FIBERSTITCH 2-0 CRV - [id number]  ANCHOR SUT FIBERSTITCH 2-0 CRV  [id number] Left 1 Implanted         DRAINS: None.     COMPLICATIONS: None.     INTRAOPERATIVE FINDINGS:  Exam under anesthesia with range of motion 5/0/150, equal to contralateral knee.  Negative Lachman's, negative posterior drawer, stable to varus and valgus stress at 0 and 30°.  Diagnostic arthroscopy with intact cartilage in all 3 compartments.  Medial meniscus had a tear in the posterior horn at the meniscal capsular junction with extrusion in the medial compartment.  The root was intact.  The lateral meniscus, including the root, was intact.  The cruciate ligaments were intact.    BRIEF INDICATION OF MEDICAL NECESSITY:   Marco Vallecillo is a 23 y.o. male is well known to our clinic with a long history of left knee pain.  he had advanced imaging, including X-rays and MRI, which demonstrated medial meniscus tear.  After discussion with the patient was determined the best course of action would be to proceed to the operating room for knee arthroscopy.  Procedures, as well as the risks, benefits, and alternatives, were explained to the patient in great detail all questions were answered.  Informed consent was obtained.      PROCEDURE IN DETAIL:   The patient was identified in the preoperative holding area and the site was marked.   a block was administered by the  anesthesia team and he was taken to the operating room where there placed in the supine position on the operating room table.  Anesthetic agents were then administered.  Exam under anesthesia performed, see the detailed findings above.  A nonsterile tourniquet was then applied to the upper thigh.  The left leg was then prepped and draped in standard sterile fashion.  A time-out was undertaken around the patient, site, surgery, surgeon, and administration preoperative antibiotics.  All was agreed upon we proceeded.    A standard lateral arthroscopic portal was established and the joint was insufflated with saline solution.  The scope was inserted into the notch and in the suprapatellar pouch and a diagnostic arthroscopy was then performed.      The suprapatellar pouch did not have a plica.      There were not noted to be loose bodies.     The camera was then moved into the notch in the ACL was examined probed.  It was noted to be intact.  A small amount of the prepatellar fat pad was removed using a combination of the shaver and electrocautery.    The leg was then placed in a figure 4 position and the lateral compartment was examined.  The lateral meniscus was thoroughly examined and noted to be intact.  The root of the lateral meniscus was probed and noted to be intact and stable.   The lateral tibial plateau and lateral femoral condyle were examined for cartilage deficits.  There were noted to be no chondral damage.    A valgus stress was then applied to the knee and the camera was placed into the medial compartment and this compartment was examined.  The medial meniscus was thoroughly examined and noted to be peripheral tear at the junction of the meniscus and capsule with extrusion into the medial compartment.  The root of the medial meniscus was probed and noted to be no chondral damage.   The tear was determined to be repairable.  The tear was prepared using a ball spike rasp.  Two all-inside fiber stitch  devices were then placed in vertical mattress-type fashion.  This anatomically reduce the tear.  They were sequentially tightened down and the suture limbs were cut.  The tear was probed noted to be stable final images were taken.  The medial tibial plateau and medial femoral condyle were examined for cartilage deficits.  There were noted to be no chondral damage.    The tracking of the patella was examined and noted to track within the trochlear groove.  The undersurface of the patella in the trochlear groove were examined for cartilage defects.  There were noted to be no chondral damage.  A small amount of soft tissue was removed from the anterior aspect of the lateral wall of the notch.  A power pick was then used to create several microfracture holes for marrow stimulation and venting.  The bony debris was removed using an arthroscopic shaver.  The camera was then placed in the medial arthroscopic portal we are able to visualize bleeding from the holes and images were taken.    At this point the procedure was complete and all instruments were removed.  The wounds were closed with Monocryl and Dermabond.  They were covered with sterile dressings.  The patient was awakened from the anesthetic agents.  The procedure was complete without complication and tolerated well by the patient.  Was then taken to the postanesthesia care unit in stable condition    POSTOPERATIVE PLAN:  He will follow the meniscus repair protocol.  Return to clinic in 2 weeks.

## 2023-11-01 NOTE — ANESTHESIA PROCEDURE NOTES
Intubation    Date/Time: 11/1/2023 10:02 AM    Performed by: Brennan Ivan CRNA  Authorized by: Beto Abad MD    Intubation:     Induction:  Intravenous    Intubated:  Postinduction    Mask Ventilation:  Easy mask    Attempts:  1    Attempted By:  CRNA    Difficult Airway Encountered?: No      Complications:  None    Airway Device:  Supraglottic airway/LMA    Airway Device Size:  5.0    Style/Cuff Inflation:  Cuffed    Secured at:  The lips    Placement Verified By:  Capnometry    Complicating Factors:  None    Findings Post-Intubation:  BS equal bilateral and atraumatic/condition of teeth unchanged

## 2023-11-01 NOTE — PLAN OF CARE
VSS.  Patient tolerating oral liquids without difficulty.   No apparent s&s of distress noted at this time, no complaints voiced at this time.   Discharge instructions and nimbus reviewed with patient and mother with good verbal feedback received.   Post op medications delivered at the bedside. Crutches given to pt and questions answered .  Patient ready for discharge.

## 2023-11-01 NOTE — ANESTHESIA PREPROCEDURE EVALUATION
"                                                                                                             2023  Pre-operative evaluation for Procedure(s) (LRB):  ARTHROSCOPY,KNEE,WITH MENISCUS REPAIR (Left)    Marco Vallecillo is a 23 y.o. male     Patient Active Problem List   Diagnosis    Glenoid labrum tear, left, initial encounter    Decreased range of motion of left shoulder    Scapular dyskinesis       Review of patient's allergies indicates:   Allergen Reactions    Peanut      "Throat swells up"       No current facility-administered medications on file prior to encounter.     Current Outpatient Medications on File Prior to Encounter   Medication Sig Dispense Refill    busPIRone (BUSPAR) 10 MG tablet Take 1 tablet (10 mg total) by mouth every evening for 3 days, THEN 1 tablet (10 mg total) 2 (two) times daily. 63 tablet 0       Past Surgical History:   Procedure Laterality Date    ARTHROSCOPY, SHOULDER, WITH CAPSULORRHAPHY Left 2022    Procedure: ARTHROSCOPY,SHOULDER,WITH CAPSULORRHAPHY - [facility];  Surgeon: Jaycee Mcguire MD;  Location: [facility] OR;  Service: Orthopedics;  Laterality: Left;  regional block    REPAIR OF SUPERIOR LABRAL ANTERIOR-TO-POSTERIOR (SLAP) TEAR OF SHOULDER Left 2022    Procedure: REPAIR, SLAP LESION, SHOULDER;  Surgeon: Jaycee Mcguire MD;  Location: [facility] OR;  Service: Orthopedics;  Laterality: Left;  regional block    WISDOM TOOTH EXTRACTION         Social History     Socioeconomic History    Marital status: Single    Number of children: 0   Tobacco Use    Smoking status: Never     Passive exposure: Never    Smokeless tobacco: Never   Substance and Sexual Activity    Alcohol use: Yes     Comment: rarely    Drug use: Yes     Types: Marijuana     Comment: smokes nightly    Sexual activity: Yes     Partners: Female   Social History Narrative    Lives with a roommate.  for [employer]. 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       EKD " Echo:  No results found for this or any previous visit.      Pre-op Assessment    I have reviewed the Patient Summary Reports.     I have reviewed the Nursing Notes. I have reviewed the NPO Status.   I have reviewed the Medications.     Review of Systems  Anesthesia Hx:  Denies Family Hx of Anesthesia complications.   Denies Personal Hx of Anesthesia complications.   Cardiovascular:   Exercise tolerance: good Denies Dysrhythmias.   Denies Angina.  Denies HALL.    Pulmonary:   Asthma asymptomatic    Renal/:   Denies Chronic Renal Disease.     Hepatic/GI:   Denies GERD.    Neurological:   Denies CVA. Denies Seizures.    Endocrine:   Denies Diabetes.    Psych:   anxiety depression          Physical Exam  General: Well nourished, Cooperative, Alert and Oriented    Airway:  Mallampati: II   Mouth Opening: Normal  TM Distance: Normal  Tongue: Normal  Neck ROM: Normal ROM    Dental:  Intact    Chest/Lungs:  Clear to auscultation, Normal Respiratory Rate    Heart:  Rate: Normal  Rhythm: Regular Rhythm        Anesthesia Plan  Type of Anesthesia, risks & benefits discussed:    Anesthesia Type: Gen Supraglottic Airway, Regional  Intra-op Monitoring Plan: Standard ASA Monitors  Post Op Pain Control Plan: multimodal analgesia, peripheral nerve block and IV/PO Opioids PRN  Induction:  IV  Informed Consent: Informed consent signed with the Patient and all parties understand the risks and agree with anesthesia plan.  All questions answered. Patient consented to blood products? Yes  ASA Score: 1  Day of Surgery Review of History & Physical: H&P Update referred to the surgeon/provider.    Ready For Surgery From Anesthesia Perspective.     .

## 2023-11-01 NOTE — PLAN OF CARE
"Nursing pre-op complete. Pt calm, will continue to monitor. Call light within reach. Pt tolerating bedside procedure nerve block well. Visited with mother at bedside prior to procedure. Pt's belongings to be placed in pre-op locker. Mother stated will  post-op medications from pharmacy in building [address]. Pt needs crutches, Ht 6'2".   "

## 2023-11-01 NOTE — BRIEF OP NOTE
[place] - Surgery ([facility])  Brief Operative Note    Surgery Date: 11/1/2023     Surgeon(s) and Role:     * Yvan Mayorga MD - Primary    Assisting Surgeon: None    Pre-op Diagnosis:  Complex tear of medial meniscus of left knee as current injury, initial encounter [S83.232A]    Post-op Diagnosis:  Post-Op Diagnosis Codes:     * Complex tear of medial meniscus of left knee as current injury, initial encounter [S83.232A]    Procedure(s) (LRB):  ARTHROSCOPY,KNEE,WITH MENISCUS REPAIR (Left)    Anesthesia: Regional    Operative Findings: medial meniscus tear    Estimated Blood Loss: * No values recorded between 11/1/2023 10:19 AM and 11/1/2023 10:54 AM *         Specimens:   Specimen (24h ago, onward)      None              Discharge Note    OUTCOME: Patient tolerated treatment/procedure well without complication and is now ready for discharge.    DISPOSITION: Home or Self Care    FINAL DIAGNOSIS:  Complex tear of medial meniscus of left knee as current injury    FOLLOWUP: In clinic    DISCHARGE INSTRUCTIONS:    Discharge Procedure Orders   Diet general     Leave dressing on - Keep it clean, dry, and intact until clinic visit     Call MD for:  temperature >100.4     Call MD for:  persistent nausea and vomiting     Call MD for:  severe uncontrolled pain     Call MD for:  difficulty breathing, headache or visual disturbances     Call MD for:  redness, tenderness, or signs of infection (pain, swelling, redness, odor or green/yellow discharge around incision site)     Call MD for:  hives     Call MD for:  persistent dizziness or light-headedness     Call MD for:  extreme fatigue

## 2023-11-01 NOTE — TRANSFER OF CARE
"Anesthesia Transfer of Care Note    Patient: Marco Vallecillo    Procedure(s) Performed: Procedure(s) (LRB):  ARTHROSCOPY,KNEE,WITH MENISCUS REPAIR (Left)    Patient location: PACU    Anesthesia Type: general    Transport from OR: Transported from OR on 6-10 L/min O2 by face mask with adequate spontaneous ventilation    Post pain: adequate analgesia    Post assessment: no apparent anesthetic complications and tolerated procedure well    Post vital signs: stable    Level of consciousness: awake, alert and oriented    Nausea/Vomiting: no nausea/vomiting    Complications: none    Transfer of care protocol was followed      Last vitals:   Visit Vitals  BP (!) 144/78 (BP Location: Left arm, Patient Position: Lying)   Pulse 66   Temp 36.7 °C (98.1 °F) (Tympanic)   Resp 16   Ht 6' 2" (1.88 m)   Wt 86.2 kg (190 lb)   SpO2 99%   BMI 24.39 kg/m²     "

## 2023-11-02 ENCOUNTER — PATIENT MESSAGE (OUTPATIENT)
Dept: SPORTS MEDICINE | Facility: CLINIC | Age: 23
End: 2023-11-02
Payer: COMMERCIAL

## 2023-11-02 NOTE — ANESTHESIA POST-OP PAIN MANAGEMENT
Acute Pain Service Progress Note    11/2/2023 1254    Unable to reach patient for [facility] follow up. Voicemail left on patient's cell number encouraging to contact anesthesia at [phone] or [facility] at [phone] for any questions or concerns about the nerve block or infusion pump. Will continue to follow up.

## 2023-11-16 ENCOUNTER — OFFICE VISIT (OUTPATIENT)
Dept: SPORTS MEDICINE | Facility: CLINIC | Age: 23
End: 2023-11-16
Payer: COMMERCIAL

## 2023-11-16 VITALS
HEART RATE: 72 BPM | HEIGHT: 74 IN | BODY MASS INDEX: 24.39 KG/M2 | DIASTOLIC BLOOD PRESSURE: 78 MMHG | WEIGHT: 190.06 LBS | SYSTOLIC BLOOD PRESSURE: 138 MMHG

## 2023-11-16 DIAGNOSIS — Z98.890 S/P MEDIAL MENISCUS REPAIR OF LEFT KNEE: Primary | ICD-10-CM

## 2023-11-16 PROCEDURE — 1159F PR MEDICATION LIST DOCUMENTED IN MEDICAL RECORD: ICD-10-PCS | Mod: CPTII,S$GLB,, | Performed by: ORTHOPAEDIC SURGERY

## 2023-11-16 PROCEDURE — 1159F MED LIST DOCD IN RCRD: CPT | Mod: CPTII,S$GLB,, | Performed by: ORTHOPAEDIC SURGERY

## 2023-11-16 PROCEDURE — 3075F SYST BP GE 130 - 139MM HG: CPT | Mod: CPTII,S$GLB,, | Performed by: ORTHOPAEDIC SURGERY

## 2023-11-16 PROCEDURE — 99999 PR PBB SHADOW E&M-EST. PATIENT-LVL III: ICD-10-PCS | Mod: PBBFAC,,, | Performed by: ORTHOPAEDIC SURGERY

## 2023-11-16 PROCEDURE — 1160F PR REVIEW ALL MEDS BY PRESCRIBER/CLIN PHARMACIST DOCUMENTED: ICD-10-PCS | Mod: CPTII,S$GLB,, | Performed by: ORTHOPAEDIC SURGERY

## 2023-11-16 PROCEDURE — 99024 POSTOP FOLLOW-UP VISIT: CPT | Mod: S$GLB,,, | Performed by: ORTHOPAEDIC SURGERY

## 2023-11-16 PROCEDURE — 99999 PR PBB SHADOW E&M-EST. PATIENT-LVL III: CPT | Mod: PBBFAC,,, | Performed by: ORTHOPAEDIC SURGERY

## 2023-11-16 PROCEDURE — 3078F DIAST BP <80 MM HG: CPT | Mod: CPTII,S$GLB,, | Performed by: ORTHOPAEDIC SURGERY

## 2023-11-16 PROCEDURE — 1160F RVW MEDS BY RX/DR IN RCRD: CPT | Mod: CPTII,S$GLB,, | Performed by: ORTHOPAEDIC SURGERY

## 2023-11-16 PROCEDURE — 3075F PR MOST RECENT SYSTOLIC BLOOD PRESS GE 130-139MM HG: ICD-10-PCS | Mod: CPTII,S$GLB,, | Performed by: ORTHOPAEDIC SURGERY

## 2023-11-16 PROCEDURE — 99024 PR POST-OP FOLLOW-UP VISIT: ICD-10-PCS | Mod: S$GLB,,, | Performed by: ORTHOPAEDIC SURGERY

## 2023-11-16 PROCEDURE — 3078F PR MOST RECENT DIASTOLIC BLOOD PRESSURE < 80 MM HG: ICD-10-PCS | Mod: CPTII,S$GLB,, | Performed by: ORTHOPAEDIC SURGERY

## 2023-11-16 NOTE — PROGRESS NOTES
Subjective:     Chief Complaint: Marco Vallecillo is a 23 y.o. male who had concerns including Post-op Evaluation of the Left Knee.    HPI    Patient is here s/p Left meniscus repair for 2 week post-op appointment. He reports 3/10 pain and is not taking anything for pain.  He denies any nausea, vomiting, fever, chills, shortness of breath, or calf pain. He is attending formal physical therapy at [facility] in [place], [place].  T scope knee brace in place.     PROCEDURE PERFORMED by Yvan Mayorga MD on 11/01/23:   Left knee arthroscopic medial meniscus repair      Review of Systems   Constitutional: Negative.   HENT: Negative.     Eyes: Negative.    Cardiovascular: Negative.    Respiratory: Negative.     Endocrine: Negative.    Hematologic/Lymphatic: Negative.    Skin: Negative.    Musculoskeletal:  Positive for joint pain, joint swelling, muscle weakness and stiffness. Negative for falls.   Neurological: Negative.    Psychiatric/Behavioral: Negative.     Allergic/Immunologic: Negative.        Pain Related Questions  Over the past 3 days, what was your average pain during activity? (I.e. running, jogging, walking, climbing stairs, getting dressed, ect.): 4  Over the past 3 days, what was your highest pain level?: 5  Over the past 3 days, what was your lowest pain level? : 3    Other  How many nights a week are you awakened by your affected body part?: 0  Was the patient's HEIGHT measured or patient reported?: Patient Reported  Was the patient's WEIGHT measured or patient reported?: Measured    Objective:     General: Marco is well-developed, well-nourished, appears stated age, in no acute distress, alert and oriented to time, place and person.     General    Constitutional: He is oriented to person, place, and time. He appears well-developed and well-nourished. No distress.   Cardiovascular:  Intact distal pulses.            Neurological: He is alert and oriented to person, place, and time.   Psychiatric: He has a  normal mood and affect. His behavior is normal. Thought content normal.             Left Knee Exam     Inspection   Erythema: absent  Scars: present  Swelling: absent  Effusion: absent  Deformity: absent  Bruising: absent    Range of Motion   Extension:  0   Flexion:  90     Other   Sensation: normal    Comments:  Incision sites healing well, without signs of erythema, infection, or wound dehiscence.    Vascular Exam       Left Pulses  Dorsalis Pedis:      2+  Posterior Tibial:      2+        Edema  Left Lower Leg: absent          Assessment:     Encounter Diagnosis   Name Primary?    S/P medial meniscus repair of left knee Yes        Plan:     1. Patient instructed to continue to utilize hinged T-scope knee brace until we see him again for 6 week post-op appt. Must be locked in extension during ambulation, but can bend the knee from 0-90 degrees at other times. May progress to 25-50% partial weight bearing over the next 2 weeks.     2. Suture tags removed Steri-Strips applied. Patient may now shower without covering incision site. Instructed not to submerge incision site in water for another 2 weeks    3. Continue daily ASA and Celebrex b.i.d.    4. Continue at [facility] PT per our meniscus repair protocol.  A copy of of this protocol was faxed over today.    5. RTC to see Yvan Mayorga MD in 4 weeks for 6 week post-op evaluation      All of the patient's questions were answered. Patient was advised to call the clinic or contact me through the patient portal for any questions or concerns.         Patient questionnaires may have been collected.

## 2023-11-28 DIAGNOSIS — S83.232A COMPLEX TEAR OF MEDIAL MENISCUS OF LEFT KNEE AS CURRENT INJURY, INITIAL ENCOUNTER: ICD-10-CM

## 2023-11-28 RX ORDER — METHOCARBAMOL 500 MG/1
500 TABLET, FILM COATED ORAL 3 TIMES DAILY PRN
Qty: 30 TABLET | Refills: 0 | Status: SHIPPED | OUTPATIENT
Start: 2023-11-28

## 2023-11-29 ENCOUNTER — OFFICE VISIT (OUTPATIENT)
Dept: PRIMARY CARE CLINIC | Facility: CLINIC | Age: 23
End: 2023-11-29
Payer: COMMERCIAL

## 2023-11-29 DIAGNOSIS — F41.1 GENERALIZED ANXIETY DISORDER WITH PANIC ATTACKS: Primary | ICD-10-CM

## 2023-11-29 DIAGNOSIS — F41.0 GENERALIZED ANXIETY DISORDER WITH PANIC ATTACKS: Primary | ICD-10-CM

## 2023-11-29 PROCEDURE — 99214 OFFICE O/P EST MOD 30 MIN: CPT | Mod: 95,,, | Performed by: STUDENT IN AN ORGANIZED HEALTH CARE EDUCATION/TRAINING PROGRAM

## 2023-11-29 PROCEDURE — 99214 PR OFFICE/OUTPT VISIT, EST, LEVL IV, 30-39 MIN: ICD-10-PCS | Mod: 95,,, | Performed by: STUDENT IN AN ORGANIZED HEALTH CARE EDUCATION/TRAINING PROGRAM

## 2023-11-29 RX ORDER — BUSPIRONE HYDROCHLORIDE 10 MG/1
10 TABLET ORAL 2 TIMES DAILY
Qty: 60 TABLET | Refills: 5 | Status: SHIPPED | OUTPATIENT
Start: 2023-11-29 | End: 2024-03-28 | Stop reason: SDUPTHER

## 2023-11-29 NOTE — PROGRESS NOTES
Telehealth Visit    The patient location is: [place]   The chief complaint leading to consultation is: Medication follow up     Visit type: audiovisual    Face to Face time with patient: 5 minutes  6 minutes of total time spent on the encounter, which includes face to face time and non-face to face time preparing to see the patient (eg, review of tests), Obtaining and/or reviewing separately obtained history, Documenting clinical information in the electronic or other health record, Independently interpreting results (not separately reported) and communicating results to the patient/family/caregiver, or Care coordination (not separately reported).       HPI    Patient is a 23 y.o.   Marco Vallecillo  has a past medical history of Anxiety disorder, unspecified, Depression, History of psychiatric hospitalization, psychiatric care, Mild intermittent asthma, uncomplicated, Psychiatric problem, and Therapy.    Patient presenting for medication follow up   Denies any side effects from BuSpar and has noted that it has improved his social anxiety       Active Medications:  Current Outpatient Medications   Medication Instructions    acetaminophen (TYLENOL) 500 mg, Oral, As needed (PRN)    aspirin (ECOTRIN) 81 mg, Oral, Daily    busPIRone (BUSPAR) 10 mg, Oral, 2 times daily    celecoxib (CELEBREX) 200 mg, Oral, 2 times daily with meals    methocarbamoL (ROBAXIN) 500 mg, Oral, 3 times daily PRN       Physical Exam    General: Does not appear to be in acute distress    Assessment and Plan     1. Generalized anxiety disorder with panic attacks  -     busPIRone (BUSPAR) 10 MG tablet; Take 1 tablet (10 mg total) by mouth 2 (two) times daily.  Dispense: 60 tablet; Refill: 5                 Upcoming Scheduled Appointments and Follow Up:    Future Appointments   Date Time Provider Department Center   12/14/2023  2:45 PM Yvan Mayorga MD [facility]       Follow Up [facility] (with who? when?): No follow-ups on  file.        Extended Emergency Contact Information  Primary Emergency Contact: Shara Vallecillo  Mobile Phone: [phone]  Relation: Mother  Preferred language: English   needed? No      Elkin Garza MD   Internal Medicine  11/29/2023 - 4:11 PM        Each patient to whom he or she provides medical services by telemedicine is:  (1) informed of the relationship between the physician and patient and the respective role of any other health care provider with respect to management of the patient; and (2) notified that he or she may decline to receive medical services by telemedicine and may withdraw from such care at any time.    While patients have the right to access their medical record, it is essential to recognize that progress notes primarily serve as a means of communication among healthcare professionals.

## 2023-12-14 ENCOUNTER — OFFICE VISIT (OUTPATIENT)
Dept: SPORTS MEDICINE | Facility: CLINIC | Age: 23
End: 2023-12-14
Payer: COMMERCIAL

## 2023-12-14 VITALS
WEIGHT: 189.63 LBS | DIASTOLIC BLOOD PRESSURE: 61 MMHG | BODY MASS INDEX: 24.34 KG/M2 | HEART RATE: 76 BPM | SYSTOLIC BLOOD PRESSURE: 102 MMHG | HEIGHT: 74 IN

## 2023-12-14 DIAGNOSIS — S83.232D COMPLEX TEAR OF MEDIAL MENISCUS OF LEFT KNEE AS CURRENT INJURY, SUBSEQUENT ENCOUNTER: ICD-10-CM

## 2023-12-14 DIAGNOSIS — Z98.890 S/P MEDIAL MENISCUS REPAIR OF LEFT KNEE: Primary | ICD-10-CM

## 2023-12-14 PROCEDURE — 99999 PR PBB SHADOW E&M-EST. PATIENT-LVL III: ICD-10-PCS | Mod: PBBFAC,,, | Performed by: STUDENT IN AN ORGANIZED HEALTH CARE EDUCATION/TRAINING PROGRAM

## 2023-12-14 PROCEDURE — 1160F PR REVIEW ALL MEDS BY PRESCRIBER/CLIN PHARMACIST DOCUMENTED: ICD-10-PCS | Mod: CPTII,S$GLB,, | Performed by: STUDENT IN AN ORGANIZED HEALTH CARE EDUCATION/TRAINING PROGRAM

## 2023-12-14 PROCEDURE — 99024 POSTOP FOLLOW-UP VISIT: CPT | Mod: S$GLB,,, | Performed by: STUDENT IN AN ORGANIZED HEALTH CARE EDUCATION/TRAINING PROGRAM

## 2023-12-14 PROCEDURE — 3078F DIAST BP <80 MM HG: CPT | Mod: CPTII,S$GLB,, | Performed by: STUDENT IN AN ORGANIZED HEALTH CARE EDUCATION/TRAINING PROGRAM

## 2023-12-14 PROCEDURE — 1159F MED LIST DOCD IN RCRD: CPT | Mod: CPTII,S$GLB,, | Performed by: STUDENT IN AN ORGANIZED HEALTH CARE EDUCATION/TRAINING PROGRAM

## 2023-12-14 PROCEDURE — 99999 PR PBB SHADOW E&M-EST. PATIENT-LVL III: CPT | Mod: PBBFAC,,, | Performed by: STUDENT IN AN ORGANIZED HEALTH CARE EDUCATION/TRAINING PROGRAM

## 2023-12-14 PROCEDURE — 3078F PR MOST RECENT DIASTOLIC BLOOD PRESSURE < 80 MM HG: ICD-10-PCS | Mod: CPTII,S$GLB,, | Performed by: STUDENT IN AN ORGANIZED HEALTH CARE EDUCATION/TRAINING PROGRAM

## 2023-12-14 PROCEDURE — 3074F PR MOST RECENT SYSTOLIC BLOOD PRESSURE < 130 MM HG: ICD-10-PCS | Mod: CPTII,S$GLB,, | Performed by: STUDENT IN AN ORGANIZED HEALTH CARE EDUCATION/TRAINING PROGRAM

## 2023-12-14 PROCEDURE — 1160F RVW MEDS BY RX/DR IN RCRD: CPT | Mod: CPTII,S$GLB,, | Performed by: STUDENT IN AN ORGANIZED HEALTH CARE EDUCATION/TRAINING PROGRAM

## 2023-12-14 PROCEDURE — 3074F SYST BP LT 130 MM HG: CPT | Mod: CPTII,S$GLB,, | Performed by: STUDENT IN AN ORGANIZED HEALTH CARE EDUCATION/TRAINING PROGRAM

## 2023-12-14 PROCEDURE — 99024 PR POST-OP FOLLOW-UP VISIT: ICD-10-PCS | Mod: S$GLB,,, | Performed by: STUDENT IN AN ORGANIZED HEALTH CARE EDUCATION/TRAINING PROGRAM

## 2023-12-14 PROCEDURE — 1159F PR MEDICATION LIST DOCUMENTED IN MEDICAL RECORD: ICD-10-PCS | Mod: CPTII,S$GLB,, | Performed by: STUDENT IN AN ORGANIZED HEALTH CARE EDUCATION/TRAINING PROGRAM

## 2023-12-14 NOTE — PROGRESS NOTES
Subjective:     Chief Complaint: Marco Vallecillo is a 23 y.o. male who had concerns including Pain of the Left Knee.    HPI    Patient is here s/p Left meniscus repair for 6 week post-op appointment. He reports that he is pain free.  He denies any nausea, vomiting, fever, chills, shortness of breath, or calf pain. He is attending formal physical therapy at [facility] in [place], [place].  T scope knee brace in place.     PROCEDURE PERFORMED by Yvan Mayorga MD on 11/01/23:   Left knee arthroscopic medial meniscus repair      Review of Systems   Constitutional: Negative.   HENT: Negative.     Eyes: Negative.    Cardiovascular: Negative.    Respiratory: Negative.     Endocrine: Negative.    Hematologic/Lymphatic: Negative.    Skin: Negative.    Musculoskeletal:  Positive for muscle weakness. Negative for falls, joint pain, joint swelling and stiffness.   Neurological: Negative.    Psychiatric/Behavioral: Negative.     Allergic/Immunologic: Negative.        Pain Related Questions  Over the past 3 days, what was your average pain during activity? (I.e. running, jogging, walking, climbing stairs, getting dressed, ect.): 5  Over the past 3 days, what was your highest pain level?: 4  Over the past 3 days, what was your lowest pain level? : 2    Other  Was the patient's HEIGHT measured or patient reported?: Patient Reported  Was the patient's WEIGHT measured or patient reported?: Measured    Objective:     General: Marco is well-developed, well-nourished, appears stated age, in no acute distress, alert and oriented to time, place and person.     General    Nursing note and vitals reviewed.  Constitutional: He is oriented to person, place, and time. He appears well-developed and well-nourished. No distress.   HENT:   Head: Normocephalic and atraumatic.   Nose: Nose normal.   Eyes: EOM are normal.   Cardiovascular:  Intact distal pulses.            Pulmonary/Chest: Effort normal. No respiratory distress.   Neurological: He  is alert and oriented to person, place, and time.   Psychiatric: He has a normal mood and affect. His behavior is normal. Judgment and thought content normal.             Left Knee Exam     Inspection   Erythema: absent  Scars: present  Swelling: absent  Effusion: absent  Deformity: absent  Bruising: absent    Range of Motion   Extension:  -5   Flexion:  120     Other   Sensation: normal    Comments:  Incision sites healing well, without signs of erythema, infection, or wound dehiscence.    Vascular Exam       Left Pulses  Dorsalis Pedis:      2+  Posterior Tibial:      2+        Edema  Left Lower Leg: absent          Assessment:   Marco Vallecilol is a 23 y.o. male status post above and doing well  Encounter Diagnoses   Name Primary?    S/P medial meniscus repair of left knee Yes    Complex tear of medial meniscus of left knee as current injury, subsequent encounter           Plan:     He is doing well.  This point we will continue physical therapy and advance per protocol as tolerated.  He will be weight-bearing as tolerated with the brace unlocked.  Can transition off of assistive devices.  When he can ambulate with no assistive devices limp free with the brace unlocked, can discontinue use of the brace.  Return to clinic in 6 weeks.      All of the patient's questions were answered. Patient was advised to call the clinic or contact me through the patient portal for any questions or concerns.         Patient questionnaires may have been collected.

## 2024-01-01 NOTE — ANESTHESIA POSTPROCEDURE EVALUATION
Anesthesia Post Evaluation    Patient: Marco Mcmullen    Procedure(s) Performed: Procedure(s) (LRB):  ARTHROSCOPY,SHOULDER,WITH CAPSULORRHAPHY - POLAR CARE (Left)  REPAIR, SLAP LESION, SHOULDER (Left)    Final Anesthesia Type: general      Patient location during evaluation: PACU  Patient participation: Yes- Able to Participate  Level of consciousness: awake and alert and oriented  Post-procedure vital signs: reviewed and stable  Pain management: adequate  Airway patency: patent    PONV status at discharge: No PONV  Anesthetic complications: no      Cardiovascular status: hemodynamically stable  Respiratory status: nasal cannula  Hydration status: euvolemic  Follow-up not needed.          Vitals Value Taken Time   /77 07/26/22 1017   Temp 36.7 °C (98.1 °F) 07/26/22 0835   Pulse 59 07/26/22 1019   Resp 17 07/26/22 1006   SpO2 100 % 07/26/22 1019   Vitals shown include unvalidated device data.      Event Time   Out of Recovery 09:45:00         Pain/Zackary Score: Pain Rating Prior to Med Admin: 7 (7/26/2022 10:06 AM)  Pain Rating Post Med Admin: 4 (7/26/2022 10:45 AM)  Zackary Score: 9 (7/26/2022  9:15 AM)         3 3

## 2024-01-16 DIAGNOSIS — Z98.890 S/P MEDIAL MENISCUS REPAIR OF LEFT KNEE: Primary | ICD-10-CM

## 2024-01-16 DIAGNOSIS — S43.432A TEAR OF LEFT GLENOID LABRUM, INITIAL ENCOUNTER: ICD-10-CM

## 2024-01-16 DIAGNOSIS — S83.232D COMPLEX TEAR OF MEDIAL MENISCUS OF LEFT KNEE AS CURRENT INJURY, SUBSEQUENT ENCOUNTER: ICD-10-CM

## 2024-01-18 ENCOUNTER — OFFICE VISIT (OUTPATIENT)
Dept: SPORTS MEDICINE | Facility: CLINIC | Age: 24
End: 2024-01-18
Payer: COMMERCIAL

## 2024-01-18 VITALS
DIASTOLIC BLOOD PRESSURE: 68 MMHG | BODY MASS INDEX: 25.7 KG/M2 | HEIGHT: 75 IN | HEART RATE: 82 BPM | WEIGHT: 206.69 LBS | SYSTOLIC BLOOD PRESSURE: 122 MMHG

## 2024-01-18 DIAGNOSIS — S83.232D COMPLEX TEAR OF MEDIAL MENISCUS OF LEFT KNEE AS CURRENT INJURY, SUBSEQUENT ENCOUNTER: ICD-10-CM

## 2024-01-18 DIAGNOSIS — Z98.890 S/P MEDIAL MENISCUS REPAIR OF LEFT KNEE: Primary | ICD-10-CM

## 2024-01-18 PROCEDURE — 99024 POSTOP FOLLOW-UP VISIT: CPT | Mod: S$GLB,,, | Performed by: STUDENT IN AN ORGANIZED HEALTH CARE EDUCATION/TRAINING PROGRAM

## 2024-01-18 PROCEDURE — 1159F MED LIST DOCD IN RCRD: CPT | Mod: CPTII,S$GLB,, | Performed by: STUDENT IN AN ORGANIZED HEALTH CARE EDUCATION/TRAINING PROGRAM

## 2024-01-18 PROCEDURE — 1160F RVW MEDS BY RX/DR IN RCRD: CPT | Mod: CPTII,S$GLB,, | Performed by: STUDENT IN AN ORGANIZED HEALTH CARE EDUCATION/TRAINING PROGRAM

## 2024-01-18 PROCEDURE — 3078F DIAST BP <80 MM HG: CPT | Mod: CPTII,S$GLB,, | Performed by: STUDENT IN AN ORGANIZED HEALTH CARE EDUCATION/TRAINING PROGRAM

## 2024-01-18 PROCEDURE — 3074F SYST BP LT 130 MM HG: CPT | Mod: CPTII,S$GLB,, | Performed by: STUDENT IN AN ORGANIZED HEALTH CARE EDUCATION/TRAINING PROGRAM

## 2024-01-18 PROCEDURE — 99999 PR PBB SHADOW E&M-EST. PATIENT-LVL III: CPT | Mod: PBBFAC,,, | Performed by: STUDENT IN AN ORGANIZED HEALTH CARE EDUCATION/TRAINING PROGRAM

## 2024-01-18 NOTE — PROGRESS NOTES
Subjective:     Chief Complaint: Marco Vallecillo is a 23 y.o. male who had concerns including Pain of the Left Knee.    HPI    Patient is here s/p Left meniscus repair for 11 week post-op appointment. He reports that he is pain free.  He denies any nausea, vomiting, fever, chills, shortness of breath, or calf pain. He is attending formal physical therapy at [facility] in [place], [place], he did take a break when he was out of town but is set to resume next week.    PROCEDURE PERFORMED by Yvan Mayorga MD on 11/01/23:   Left knee arthroscopic medial meniscus repair      Review of Systems   Constitutional: Negative.   HENT: Negative.     Eyes: Negative.    Cardiovascular: Negative.    Respiratory: Negative.     Endocrine: Negative.    Hematologic/Lymphatic: Negative.    Skin: Negative.    Musculoskeletal:  Positive for muscle weakness. Negative for falls, joint pain, joint swelling and stiffness.   Neurological: Negative.    Psychiatric/Behavioral: Negative.     Allergic/Immunologic: Negative.        Pain Related Questions  Over the past 3 days, what was your highest pain level?: 2  Over the past 3 days, what was your lowest pain level? : 2    Other  How many nights a week are you awakened by your affected body part?: 2  Was the patient's HEIGHT measured or patient reported?: Patient Reported  Was the patient's WEIGHT measured or patient reported?: Measured    Objective:     General: Marco is well-developed, well-nourished, appears stated age, in no acute distress, alert and oriented to time, place and person.     General    Nursing note and vitals reviewed.  Constitutional: He is oriented to person, place, and time. He appears well-developed and well-nourished. No distress.   HENT:   Head: Normocephalic and atraumatic.   Nose: Nose normal.   Eyes: EOM are normal.   Cardiovascular:  Intact distal pulses.            Pulmonary/Chest: Effort normal. No respiratory distress.   Neurological: He is alert and oriented to  person, place, and time.   Psychiatric: He has a normal mood and affect. His behavior is normal. Judgment and thought content normal.             Left Knee Exam     Inspection   Erythema: absent  Scars: present  Swelling: absent  Effusion: absent  Deformity: absent  Bruising: absent    Range of Motion   Extension:  -5   Flexion:  140     Other   Sensation: normal    Comments:  Incision sites healing well, without signs of erythema, infection, or wound dehiscence.    Muscle Strength   Left Lower Extremity   Quadriceps:  4/5   Hamstrin/5     Vascular Exam       Left Pulses  Dorsalis Pedis:      2+  Posterior Tibial:      2+        Edema  Left Lower Leg: absent          Assessment:   Marco Vallecillo is a 23 y.o. male status post above and doing well  Encounter Diagnoses   Name Primary?    S/P medial meniscus repair of left knee Yes    Complex tear of medial meniscus of left knee as current injury, subsequent encounter             Plan:     He is doing well.  He will continue physical therapy advanced per protocol as tolerated.  Return to clinic in 6 weeks for repeat evaluation.    All of the patient's questions were answered. Patient was advised to call the clinic or contact me through the patient portal for any questions or concerns.         Patient questionnaires may have been collected.

## 2024-02-08 ENCOUNTER — OFFICE VISIT (OUTPATIENT)
Dept: DERMATOLOGY | Facility: CLINIC | Age: 24
End: 2024-02-08
Payer: COMMERCIAL

## 2024-02-08 DIAGNOSIS — L73.1 PSEUDOFOLLICULITIS BARBAE: ICD-10-CM

## 2024-02-08 DIAGNOSIS — L21.9 SEBORRHEIC DERMATITIS: ICD-10-CM

## 2024-02-08 DIAGNOSIS — L73.9 FOLLICULITIS: Primary | ICD-10-CM

## 2024-02-08 PROCEDURE — 99204 OFFICE O/P NEW MOD 45 MIN: CPT | Mod: S$GLB,,, | Performed by: STUDENT IN AN ORGANIZED HEALTH CARE EDUCATION/TRAINING PROGRAM

## 2024-02-08 PROCEDURE — 1159F MED LIST DOCD IN RCRD: CPT | Mod: CPTII,S$GLB,, | Performed by: STUDENT IN AN ORGANIZED HEALTH CARE EDUCATION/TRAINING PROGRAM

## 2024-02-08 PROCEDURE — 1160F RVW MEDS BY RX/DR IN RCRD: CPT | Mod: CPTII,S$GLB,, | Performed by: STUDENT IN AN ORGANIZED HEALTH CARE EDUCATION/TRAINING PROGRAM

## 2024-02-08 PROCEDURE — 99999 PR PBB SHADOW E&M-EST. PATIENT-LVL II: CPT | Mod: PBBFAC,,, | Performed by: STUDENT IN AN ORGANIZED HEALTH CARE EDUCATION/TRAINING PROGRAM

## 2024-02-08 RX ORDER — DOXYCYCLINE HYCLATE 100 MG
100 TABLET ORAL DAILY
Qty: 90 TABLET | Refills: 0 | Status: SHIPPED | OUTPATIENT
Start: 2024-02-08

## 2024-02-08 RX ORDER — CLINDAMYCIN PHOSPHATE 11.9 MG/ML
SOLUTION TOPICAL DAILY
Qty: 60 ML | Refills: 2 | Status: SHIPPED | OUTPATIENT
Start: 2024-02-08 | End: 2024-04-01 | Stop reason: SDUPTHER

## 2024-02-08 RX ORDER — CLOBETASOL PROPIONATE 0.46 MG/ML
SOLUTION TOPICAL DAILY
Qty: 50 ML | Refills: 2 | Status: SHIPPED | OUTPATIENT
Start: 2024-02-08

## 2024-02-08 NOTE — PROGRESS NOTES
Subjective:       Patient ID:  Marco Vallecillo is a 23 y.o. male who presents for   Chief Complaint   Patient presents with    Acne    Hair/Scalp Problem     Itchy scalp     History of Present Illness: The patient presents with chief complaint of a rash/acne breakouts.  Location: neck, the upper chest and back  Duration: ongoing for several months  Signs/Symptoms: reports having recurring itchy red bumps and pus bumps, leading to scarring  Prior treatments: has tried cerave washes with minimal improvement.     Patient with new complaint of lesion(s) - patient reports having dandruff/redness/bumps in the scalp  Location: throughout the scalp  Duration: ongoing for months  Symptoms: reports having persistent bumps and itching throughout the scalp  Relieving factors/Previous treatments: none          Acne    Hair/Scalp Problem        Review of Systems   Constitutional:  Negative for fever and chills.        Objective:    Physical Exam   Constitutional: He appears well-developed and well-nourished. No distress.   Neurological: He is alert and oriented to person, place, and time. He is not disoriented.   Psychiatric: He has a normal mood and affect.   Skin:   Areas Examined (abnormalities noted in diagram):   Scalp / Hair Palpated and Inspected  Head / Face Inspection Performed  Neck Inspection Performed  Chest / Axilla Inspection Performed  Abdomen Inspection Performed  Back Inspection Performed  RUE Inspected  LUE Inspection Performed                   Diagram Legend     Erythematous scaling macule/papule c/w actinic keratosis       Vascular papule c/w angioma      Pigmented verrucoid papule/plaque c/w seborrheic keratosis      Yellow umbilicated papule c/w sebaceous hyperplasia      Irregularly shaped tan macule c/w lentigo     1-2 mm smooth white papules consistent with Milia      Movable subcutaneous cyst with punctum c/w epidermal inclusion cyst      Subcutaneous movable cyst c/w pilar cyst      Firm pink to  brown papule c/w dermatofibroma      Pedunculated fleshy papule(s) c/w skin tag(s)      Evenly pigmented macule c/w junctional nevus     Mildly variegated pigmented, slightly irregular-bordered macule c/w mildly atypical nevus      Flesh colored to evenly pigmented papule c/w intradermal nevus       Pink pearly papule/plaque c/w basal cell carcinoma      Erythematous hyperkeratotic cursted plaque c/w SCC      Surgical scar with no sign of skin cancer recurrence      Open and closed comedones      Inflammatory papules and pustules      Verrucoid papule consistent consistent with wart     Erythematous eczematous patches and plaques     Dystrophic onycholytic nail with subungual debris c/w onychomycosis     Umbilicated papule    Erythematous-base heme-crusted tan verrucoid plaque consistent with inflamed seborrheic keratosis     Erythematous Silvery Scaling Plaque c/w Psoriasis     See annotation      Assessment / Plan:        Folliculitis  Seborrheic dermatitis  Pseudofolliculitis barbae  -     doxycycline (VIBRA-TABS) 100 MG tablet; Take 1 tablet (100 mg total) by mouth once daily.  Dispense: 90 tablet; Refill: 0  -     clobetasoL (TEMOVATE) 0.05 % external solution; Apply topically once daily. Apply to the scalp.  Dispense: 50 mL; Refill: 2  -     clindamycin (CLEOCIN T) 1 % external solution; Apply topically once daily. Apply to the neck and trunk  Dispense: 60 mL; Refill: 2  -      Counseled to shave in the direction of hair grain growth (rather than against).                  No follow-ups on file.

## 2024-02-21 ENCOUNTER — PATIENT MESSAGE (OUTPATIENT)
Dept: SPORTS MEDICINE | Facility: CLINIC | Age: 24
End: 2024-02-21
Payer: COMMERCIAL

## 2024-03-03 ENCOUNTER — PATIENT MESSAGE (OUTPATIENT)
Dept: SPORTS MEDICINE | Facility: CLINIC | Age: 24
End: 2024-03-03
Payer: COMMERCIAL

## 2024-03-05 ENCOUNTER — OFFICE VISIT (OUTPATIENT)
Dept: SPORTS MEDICINE | Facility: CLINIC | Age: 24
End: 2024-03-05
Payer: COMMERCIAL

## 2024-03-05 ENCOUNTER — HOSPITAL ENCOUNTER (OUTPATIENT)
Dept: RADIOLOGY | Facility: HOSPITAL | Age: 24
Discharge: HOME | End: 2024-03-05
Attending: STUDENT IN AN ORGANIZED HEALTH CARE EDUCATION/TRAINING PROGRAM
Payer: COMMERCIAL

## 2024-03-05 ENCOUNTER — OFFICE VISIT (OUTPATIENT)
Dept: ORTHOPEDICS | Facility: CLINIC | Age: 24
End: 2024-03-05
Payer: COMMERCIAL

## 2024-03-05 VITALS
BODY MASS INDEX: 24.46 KG/M2 | SYSTOLIC BLOOD PRESSURE: 120 MMHG | HEIGHT: 75 IN | HEART RATE: 73 BPM | DIASTOLIC BLOOD PRESSURE: 71 MMHG | WEIGHT: 196.75 LBS

## 2024-03-05 DIAGNOSIS — M20.012 MALLET FINGER OF LEFT HAND: ICD-10-CM

## 2024-03-05 DIAGNOSIS — S62.635A CLOSED MALLET FRACTURE OF DISTAL PHALANX OF LEFT RING FINGER: Primary | ICD-10-CM

## 2024-03-05 DIAGNOSIS — S83.232D COMPLEX TEAR OF MEDIAL MENISCUS OF LEFT KNEE AS CURRENT INJURY, SUBSEQUENT ENCOUNTER: ICD-10-CM

## 2024-03-05 DIAGNOSIS — M79.645 FINGER PAIN, LEFT: ICD-10-CM

## 2024-03-05 DIAGNOSIS — Z98.890 S/P MEDIAL MENISCUS REPAIR OF LEFT KNEE: ICD-10-CM

## 2024-03-05 DIAGNOSIS — S62.639A CLOSED AVULSION FRACTURE OF DISTAL PHALANX OF FINGER, INITIAL ENCOUNTER: Primary | ICD-10-CM

## 2024-03-05 PROCEDURE — 1160F RVW MEDS BY RX/DR IN RCRD: CPT | Mod: CPTII,S$GLB,, | Performed by: STUDENT IN AN ORGANIZED HEALTH CARE EDUCATION/TRAINING PROGRAM

## 2024-03-05 PROCEDURE — 99213 OFFICE O/P EST LOW 20 MIN: CPT | Mod: S$GLB,,, | Performed by: STUDENT IN AN ORGANIZED HEALTH CARE EDUCATION/TRAINING PROGRAM

## 2024-03-05 PROCEDURE — 73140 X-RAY EXAM OF FINGER(S): CPT | Mod: 26,,, | Performed by: RADIOLOGY

## 2024-03-05 PROCEDURE — 3078F DIAST BP <80 MM HG: CPT | Mod: CPTII,S$GLB,, | Performed by: STUDENT IN AN ORGANIZED HEALTH CARE EDUCATION/TRAINING PROGRAM

## 2024-03-05 PROCEDURE — 99999 PR PBB SHADOW E&M-EST. PATIENT-LVL III: CPT | Mod: PBBFAC,,, | Performed by: STUDENT IN AN ORGANIZED HEALTH CARE EDUCATION/TRAINING PROGRAM

## 2024-03-05 PROCEDURE — 3074F SYST BP LT 130 MM HG: CPT | Mod: CPTII,S$GLB,, | Performed by: STUDENT IN AN ORGANIZED HEALTH CARE EDUCATION/TRAINING PROGRAM

## 2024-03-05 PROCEDURE — 1159F MED LIST DOCD IN RCRD: CPT | Mod: CPTII,S$GLB,, | Performed by: STUDENT IN AN ORGANIZED HEALTH CARE EDUCATION/TRAINING PROGRAM

## 2024-03-05 PROCEDURE — 73140 X-RAY EXAM OF FINGER(S): CPT | Mod: TC

## 2024-03-05 PROCEDURE — 99999 PR PBB SHADOW E&M-EST. PATIENT-LVL III: CPT | Mod: PBBFAC,,, | Performed by: PHYSICIAN ASSISTANT

## 2024-03-05 PROCEDURE — 99214 OFFICE O/P EST MOD 30 MIN: CPT | Mod: S$GLB,,, | Performed by: PHYSICIAN ASSISTANT

## 2024-03-05 PROCEDURE — 3008F BODY MASS INDEX DOCD: CPT | Mod: CPTII,S$GLB,, | Performed by: STUDENT IN AN ORGANIZED HEALTH CARE EDUCATION/TRAINING PROGRAM
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
## 2024-03-28 DIAGNOSIS — F41.1 GENERALIZED ANXIETY DISORDER WITH PANIC ATTACKS: ICD-10-CM

## 2024-03-28 DIAGNOSIS — F41.0 GENERALIZED ANXIETY DISORDER WITH PANIC ATTACKS: ICD-10-CM

## 2024-04-01 DIAGNOSIS — L73.9 FOLLICULITIS: ICD-10-CM

## 2024-04-01 RX ORDER — BUSPIRONE HYDROCHLORIDE 10 MG/1
10 TABLET ORAL 2 TIMES DAILY
Qty: 60 TABLET | Refills: 5 | Status: SHIPPED | OUTPATIENT
Start: 2024-04-01 | End: 2024-09-28

## 2024-04-02 RX ORDER — CLINDAMYCIN PHOSPHATE 11.9 MG/ML
SOLUTION TOPICAL DAILY
Qty: 60 ML | Refills: 2 | Status: SHIPPED | OUTPATIENT
Start: 2024-04-02 | End: 2024-04-22 | Stop reason: SDUPTHER

## 2024-04-02 NOTE — PROGRESS NOTES
"Primary Care  Office Visit - In Person  4/4/2024      HPI    Patient is a 23 y.o.   Marco Vallecillo  has a past medical history of Anxiety disorder, unspecified, Depression, History of psychiatric hospitalization, psychiatric care, Mild intermittent asthma, uncomplicated, Psychiatric problem, and Therapy.    Patient presents with headache which occurs most frequently after smoking marijuana  Characterized by bandlike throbbing pain across forehead which is associated with photophobia  Symptoms do not improve with aleve  Reports adequate water intake   Eats breakfast and dinner but occasionally skips meals/snacks during the day     Patient also reports decreased appetite which only improves with exercising.   He also reports early satiety and generalized abdominal discomfort which improves with bowel movements   No hx of constipation, vomiting, or diarrhea    He would like STI screening. No symptoms       Active Medications:  Current Outpatient Medications   Medication Instructions    busPIRone (BUSPAR) 10 mg, Oral, 2 times daily    clindamycin (CLEOCIN T) 1 % external solution Topical (Top), Daily, Apply to the neck and trunk    clobetasoL (TEMOVATE) 0.05 % external solution Topical (Top), Daily, Apply to the scalp.    doxycycline (VIBRA-TABS) 100 mg, Oral, Daily    HYDROcodone-acetaminophen (NORCO) 5-325 mg per tablet 1 tablet, Oral, 4 times daily PRN       Vitals:    04/04/24 1316   BP: 114/66   BP Location: Right arm   Pulse: 71   Temp: 98.5 °F (36.9 °C)   SpO2: 98%   Weight: 84 kg (185 lb 3 oz)   Height: 6' 3" (1.905 m)       Physical Exam  Vitals reviewed.   Constitutional:       General: He is not in acute distress.  Cardiovascular:      Rate and Rhythm: Normal rate and regular rhythm.      Pulses: Normal pulses.      Heart sounds: Normal heart sounds.   Pulmonary:      Effort: Pulmonary effort is normal.      Breath sounds: Normal breath sounds.   Abdominal:      General: Abdomen is flat. Bowel sounds are " normal.      Palpations: Abdomen is soft.      Tenderness: There is abdominal tenderness. There is no guarding or rebound.   Musculoskeletal:      Right lower leg: No edema.      Left lower leg: No edema.   Neurological:      General: No focal deficit present.      Mental Status: He is oriented to person, place, and time.          Assessment and Plan     1. Encounter for preventative adult health care examination    2. Tension headache  -     CBC Without Differential; Future; Expected date: 04/04/2024    3. Routine screening for STI (sexually transmitted infection)  -     Hepatitis C Antibody; Future; Expected date: 04/04/2024  -     HIV 1/2 Ag/Ab (4th Gen); Future; Expected date: 04/04/2024  -     RPR; Future; Expected date: 04/04/2024  -     C. trachomatis/N. gonorrhoeae by AMP BHUPENDRA [place]; Urine    4. Generalized abdominal pain  -     COMPREHENSIVE METABOLIC PANEL; Future; Expected date: 04/04/2024        Previous labs, records, and notes reviewed and considered for their impact on clinical decision making today.                Upcoming Scheduled Appointments and Follow Up:    Future Appointments   Date Time Provider Department Center   4/18/2024  1:15 PM Alfredo Garcia MD [facility] [place]   4/23/2024  2:15 PM Yvan Mayorga MD [facility]       Follow Up [facility] (with who? when?): Follow up in about 6 months (around 10/4/2024).      Extended Emergency Contact Information  Primary Emergency Contact: Shara Vallecillo  Mobile Phone: [phone]  Relation: Mother  Preferred language: English   needed? No      Elkin Garza MD   Internal Medicine  4/4/2024 - 1:45 PM    I spent a total of 30 minutes on the day of the visit.This includes face to face time and non-face to face time preparing to see the patient (eg, review of tests), obtaining and/or reviewing separately obtained history, documenting clinical information in the electronic or other health record, independently  interpreting results and communicating results to the patient/family/caregiver, or care coordinator.    While patients have the right to access their medical record, it is essential to recognize that progress notes primarily serve as a means of communication among healthcare professionals.

## 2024-04-04 ENCOUNTER — LAB VISIT (OUTPATIENT)
Dept: LAB | Facility: HOSPITAL | Age: 24
End: 2024-04-04
Attending: STUDENT IN AN ORGANIZED HEALTH CARE EDUCATION/TRAINING PROGRAM
Payer: COMMERCIAL

## 2024-04-04 ENCOUNTER — OFFICE VISIT (OUTPATIENT)
Dept: PRIMARY CARE CLINIC | Facility: CLINIC | Age: 24
End: 2024-04-04
Payer: COMMERCIAL

## 2024-04-04 VITALS
DIASTOLIC BLOOD PRESSURE: 66 MMHG | HEIGHT: 75 IN | SYSTOLIC BLOOD PRESSURE: 114 MMHG | BODY MASS INDEX: 23.03 KG/M2 | OXYGEN SATURATION: 98 % | WEIGHT: 185.19 LBS | HEART RATE: 71 BPM | TEMPERATURE: 99 F

## 2024-04-04 DIAGNOSIS — Z11.3 ROUTINE SCREENING FOR STI (SEXUALLY TRANSMITTED INFECTION): ICD-10-CM

## 2024-04-04 DIAGNOSIS — Z00.00 ENCOUNTER FOR PREVENTATIVE ADULT HEALTH CARE EXAMINATION: Primary | ICD-10-CM

## 2024-04-04 DIAGNOSIS — R10.84 GENERALIZED ABDOMINAL PAIN: ICD-10-CM

## 2024-04-04 DIAGNOSIS — G44.209 TENSION HEADACHE: ICD-10-CM

## 2024-04-04 LAB
ALBUMIN SERPL BCP-MCNC: 4 G/DL (ref 3.5–5.2)
ALP SERPL-CCNC: 78 U/L (ref 55–135)
ALT SERPL W/O P-5'-P-CCNC: 26 U/L (ref 10–44)
ANION GAP SERPL CALC-SCNC: 5 MMOL/L (ref 8–16)
AST SERPL-CCNC: 20 U/L (ref 10–40)
BILIRUB SERPL-MCNC: 1.9 MG/DL (ref 0.1–1)
BUN SERPL-MCNC: 11 MG/DL (ref 6–20)
CALCIUM SERPL-MCNC: 8.8 MG/DL (ref 8.7–10.5)
CHLORIDE SERPL-SCNC: 109 MMOL/L (ref 95–110)
CO2 SERPL-SCNC: 25 MMOL/L (ref 23–29)
CREAT SERPL-MCNC: 1 MG/DL (ref 0.5–1.4)
ERYTHROCYTE [DISTWIDTH] IN BLOOD BY AUTOMATED COUNT: 11.7 % (ref 11.5–14.5)
EST. GFR  (NO RACE VARIABLE): >60 ML/MIN/1.73 M^2
GLUCOSE SERPL-MCNC: 88 MG/DL (ref 70–110)
HCT VFR BLD AUTO: 42.9 % (ref 40–54)
HCV AB SERPL QL IA: NORMAL
HGB BLD-MCNC: 13.4 G/DL (ref 14–18)
HIV 1+2 AB+HIV1 P24 AG SERPL QL IA: NORMAL
MCH RBC QN AUTO: 27.9 PG (ref 27–31)
MCHC RBC AUTO-ENTMCNC: 31.2 G/DL (ref 32–36)
MCV RBC AUTO: 89 FL (ref 82–98)
PLATELET # BLD AUTO: 202 K/UL (ref 150–450)
PMV BLD AUTO: 12.2 FL (ref 9.2–12.9)
POTASSIUM SERPL-SCNC: 4 MMOL/L (ref 3.5–5.1)
PROT SERPL-MCNC: 6.5 G/DL (ref 6–8.4)
RBC # BLD AUTO: 4.8 M/UL (ref 4.6–6.2)
SODIUM SERPL-SCNC: 139 MMOL/L (ref 136–145)
WBC # BLD AUTO: 5.05 K/UL (ref 3.9–12.7)

## 2024-04-04 PROCEDURE — 80053 COMPREHEN METABOLIC PANEL: CPT | Performed by: STUDENT IN AN ORGANIZED HEALTH CARE EDUCATION/TRAINING PROGRAM

## 2024-04-04 PROCEDURE — 86803 HEPATITIS C AB TEST: CPT | Performed by: STUDENT IN AN ORGANIZED HEALTH CARE EDUCATION/TRAINING PROGRAM

## 2024-04-04 PROCEDURE — 87591 N.GONORRHOEAE DNA AMP PROB: CPT | Performed by: STUDENT IN AN ORGANIZED HEALTH CARE EDUCATION/TRAINING PROGRAM

## 2024-04-04 PROCEDURE — 85027 COMPLETE CBC AUTOMATED: CPT | Performed by: STUDENT IN AN ORGANIZED HEALTH CARE EDUCATION/TRAINING PROGRAM

## 2024-04-04 PROCEDURE — 3008F BODY MASS INDEX DOCD: CPT | Mod: CPTII,S$GLB,, | Performed by: STUDENT IN AN ORGANIZED HEALTH CARE EDUCATION/TRAINING PROGRAM

## 2024-04-04 PROCEDURE — 99999 PR PBB SHADOW E&M-EST. PATIENT-LVL III: CPT | Mod: PBBFAC,,, | Performed by: STUDENT IN AN ORGANIZED HEALTH CARE EDUCATION/TRAINING PROGRAM

## 2024-04-04 PROCEDURE — 36415 COLL VENOUS BLD VENIPUNCTURE: CPT | Mod: PN | Performed by: STUDENT IN AN ORGANIZED HEALTH CARE EDUCATION/TRAINING PROGRAM

## 2024-04-04 PROCEDURE — 99214 OFFICE O/P EST MOD 30 MIN: CPT | Mod: S$GLB,,, | Performed by: STUDENT IN AN ORGANIZED HEALTH CARE EDUCATION/TRAINING PROGRAM

## 2024-04-04 PROCEDURE — 87389 HIV-1 AG W/HIV-1&-2 AB AG IA: CPT | Performed by: STUDENT IN AN ORGANIZED HEALTH CARE EDUCATION/TRAINING PROGRAM

## 2024-04-04 PROCEDURE — 3074F SYST BP LT 130 MM HG: CPT | Mod: CPTII,S$GLB,, | Performed by: STUDENT IN AN ORGANIZED HEALTH CARE EDUCATION/TRAINING PROGRAM

## 2024-04-04 PROCEDURE — 3078F DIAST BP <80 MM HG: CPT | Mod: CPTII,S$GLB,, | Performed by: STUDENT IN AN ORGANIZED HEALTH CARE EDUCATION/TRAINING PROGRAM

## 2024-04-04 PROCEDURE — 1159F MED LIST DOCD IN RCRD: CPT | Mod: CPTII,S$GLB,, | Performed by: STUDENT IN AN ORGANIZED HEALTH CARE EDUCATION/TRAINING PROGRAM

## 2024-04-04 PROCEDURE — 86592 SYPHILIS TEST NON-TREP QUAL: CPT | Performed by: STUDENT IN AN ORGANIZED HEALTH CARE EDUCATION/TRAINING PROGRAM

## 2024-04-04 RX ORDER — HYDROCODONE BITARTRATE AND ACETAMINOPHEN 5; 325 MG/1; MG/1
1 TABLET ORAL 4 TIMES DAILY PRN
COMMUNITY
Start: 2024-03-20 | End: 2024-04-24

## 2024-04-06 DIAGNOSIS — E80.6 HYPERBILIRUBINEMIA: Primary | ICD-10-CM

## 2024-04-15 ENCOUNTER — PATIENT MESSAGE (OUTPATIENT)
Dept: PRIMARY CARE CLINIC | Facility: CLINIC | Age: 24
End: 2024-04-15
Payer: COMMERCIAL

## 2024-04-15 ENCOUNTER — PATIENT MESSAGE (OUTPATIENT)
Dept: DERMATOLOGY | Facility: CLINIC | Age: 24
End: 2024-04-15
Payer: COMMERCIAL

## 2024-04-15 DIAGNOSIS — G44.221 CHRONIC TENSION-TYPE HEADACHE, INTRACTABLE: Primary | ICD-10-CM

## 2024-04-18 ENCOUNTER — OFFICE VISIT (OUTPATIENT)
Dept: DERMATOLOGY | Facility: CLINIC | Age: 24
End: 2024-04-18
Payer: COMMERCIAL

## 2024-04-18 DIAGNOSIS — L21.9 SEBORRHEIC DERMATITIS: Primary | ICD-10-CM

## 2024-04-18 DIAGNOSIS — L73.1 PSEUDOFOLLICULITIS BARBAE: ICD-10-CM

## 2024-04-18 PROCEDURE — 1159F MED LIST DOCD IN RCRD: CPT | Mod: CPTII,95,, | Performed by: STUDENT IN AN ORGANIZED HEALTH CARE EDUCATION/TRAINING PROGRAM

## 2024-04-18 PROCEDURE — 1160F RVW MEDS BY RX/DR IN RCRD: CPT | Mod: CPTII,95,, | Performed by: STUDENT IN AN ORGANIZED HEALTH CARE EDUCATION/TRAINING PROGRAM

## 2024-04-18 PROCEDURE — G2211 COMPLEX E/M VISIT ADD ON: HCPCS | Mod: 95,,, | Performed by: STUDENT IN AN ORGANIZED HEALTH CARE EDUCATION/TRAINING PROGRAM

## 2024-04-18 PROCEDURE — 99214 OFFICE O/P EST MOD 30 MIN: CPT | Mod: 95,,, | Performed by: STUDENT IN AN ORGANIZED HEALTH CARE EDUCATION/TRAINING PROGRAM

## 2024-04-18 RX ORDER — CLOBETASOL PROPIONATE 0.46 MG/ML
SOLUTION TOPICAL DAILY
Qty: 50 ML | Refills: 3 | Status: SHIPPED | OUTPATIENT
Start: 2024-04-18

## 2024-04-18 RX ORDER — TRETINOIN 0.25 MG/G
CREAM TOPICAL NIGHTLY
Qty: 45 G | Refills: 3 | Status: SHIPPED | OUTPATIENT
Start: 2024-04-18

## 2024-04-18 RX ORDER — CLINDAMYCIN PHOSPHATE 11.9 MG/ML
SOLUTION TOPICAL 2 TIMES DAILY
Qty: 60 ML | Refills: 3 | Status: SHIPPED | OUTPATIENT
Start: 2024-04-18

## 2024-04-18 RX ORDER — DOXYCYCLINE HYCLATE 100 MG
100 TABLET ORAL DAILY
Qty: 30 TABLET | Refills: 0 | Status: SHIPPED | OUTPATIENT
Start: 2024-04-18

## 2024-04-18 NOTE — PATIENT INSTRUCTIONS

## 2024-04-18 NOTE — PROGRESS NOTES
The patient location is: home/school  The chief complaint leading to consultation is: follow-up seb derm, pseudofolliculitis     Visit type: audiovisual    Face to Face time with patient: 10mins  10 minutes of total time spent on the encounter, which includes face to face time and non-face to face time preparing to see the patient (eg, review of tests), Obtaining and/or reviewing separately obtained history, Documenting clinical information in the electronic or other health record, Independently interpreting results (not separately reported) and communicating results to the patient/family/caregiver, or Care coordination (not separately reported).         Each patient to whom he or she provides medical services by telemedicine is:  (1) informed of the relationship between the physician and patient and the respective role of any other health care provider with respect to management of the patient; and (2) notified that he or she may decline to receive medical services by telemedicine and may withdraw from such care at any time.    Notes: Follow-up of seb derm and pseudofolliculitis  on the neck/beard, last seen on 2/8/24. Reports since starting the doxycycline, and using the clindamycin and clobetasol on the scalp has noticed much improvement in symptoms with clear scalp. He does continued to get ingrown hairs and bumps on the chin/neck, but is better. The ingrown hairs are mostly bothersome today.     ROS: Otherwise negative    PE: const/neuro - AAOx3, NAD          Skin -         A/P: 1) Pseudofolliculitis barbae/Seborrheic dermatitis - improving on the face, but still with a lot of ingrown hairs in the bear and neck area. Will add tretinoin to regimen to help with the ingrown hairs, and refill sent topical clindamycin, clobetasol and 1 additional month of doxycycline.   -     tretinoin (RETIN-A) 0.025 % cream; Apply topically every evening.  -     clobetasoL (TEMOVATE) 0.05 % external solution; Apply topically once  daily.  -     clindamycin (CLEOCIN T) 1 % external solution; Apply topically 2 (two) times daily.  -     doxycycline (VIBRA-TABS) 100 MG tablet; Take 1 tablet (100 mg total) by mouth once daily.

## 2024-04-21 NOTE — PROGRESS NOTES
"Subjective:      Patient ID: Marco Vallecillo is a 23 y.o. male.    Chief Complaint:  "headaches"    History of Present Illness  HPI 23 years old AA Male with PMHx of " Scapular Dyskinesia " and others Medical issues came with Mother for the evaluation and recommendation of "headaches"        Abortive therapies (tried and failed): None     Preventative therapies (tried and failed): None    Started: around middle school time / last month came back after many years of no having them.  Describes: Patient reports tension type headaches are a dull pain and migraine type headaches are stabbing  Timing: Patient reports migraine type headaches have a duration from 4 hours-2 days.   Frequency: Patient reports tension type headaches occur daily upon awakening.   He reports migraine type headaches occur 1 time per week.   Pain: 3/10-6/10   Location: Patient reports tension type headaches occur to bilateral frontal and temporal region and migraine type headaches   present on the left frontal-temporal region and behind the left eye  Family: Patient reports paternal grandmother, paternal aunt, and cousins with migraines.   Medications: Patient has tried Aleve, Allegra (thinking headaches may have been caused by allergies). Medications did not seem to help per patient.   Worsen: exposure to light, smoking THC, and lack of sleep. The migraine type headache is not positional or postural but worsens with movement and Valsalva.   Alleviated: Sleeping  Associated symptoms: photophobia, nausea, vomiting, sinus pressure, and decreased appetite    Patient denies phonophobia, blurry vision, double vision, scalp sensitivity, numbness/tingling to extremities,   focal weakness on one side of the body, neck pain, ringing in ears, denies headaches slowly building up in intensity during the day,   Triggers: fatigue  Prodrome symptoms: None    No history of head trauma. No history of seizures. No caffeine overuse. No vertigo. No blacking out.  " No fever, chills, or rigors.   No history of significant memory loss. No history of strokes.  The patient cannot think of food that aggravates the headache.    Paternal grandmother with history of early dementia.    Patient reports excessive grinding of his teeth at night and non-compliance with wearing mouth guard for the past month.   Patient is educated that this can possibly be a trigger of tension type headaches.     Review of Systems  Review of Systems   Constitutional:  Positive for appetite change. Negative for activity change, chills, diaphoresis, fatigue, fever and unexpected weight change.   HENT:  Negative for congestion, dental problem, ear discharge, ear pain, hearing loss, postnasal drip, rhinorrhea, sinus pressure, sinus pain, sore throat, tinnitus and trouble swallowing.    Eyes:  Positive for photophobia. Negative for discharge, redness, itching and visual disturbance.   Respiratory:  Negative for chest tightness and shortness of breath.    Cardiovascular:  Negative for chest pain and palpitations.   Gastrointestinal:  Positive for nausea and vomiting. Negative for abdominal distention, abdominal pain, constipation and diarrhea.   Endocrine: Negative for cold intolerance, polydipsia and polyphagia.   Genitourinary:  Negative for decreased urine volume, difficulty urinating, flank pain and urgency.   Musculoskeletal:  Positive for neck pain. Negative for arthralgias, back pain, gait problem and neck stiffness.        Patient reports bilateral neck stiffness and tightness not associated with headache.   Skin:  Negative for color change.   Allergic/Immunologic: Negative for environmental allergies and immunocompromised state.        Peanuts.    Neurological:  Positive for headaches. Negative for dizziness, tremors, seizures, syncope, facial asymmetry, speech difficulty, weakness, light-headedness and numbness.   Hematological:  Negative for adenopathy. Does not bruise/bleed easily.    Psychiatric/Behavioral:  Positive for sleep disturbance. Negative for agitation, confusion, hallucinations and suicidal ideas. The patient is not nervous/anxious and is not hyperactive.    All other systems reviewed and are negative.    Objective:     Neurologic Exam     Mental Status   Oriented to person, place, and time.   Registration: recalls 3 of 3 objects. Recall at 5 minutes: recalls 3 of 3 objects. Follows 3 step commands.   Attention: normal. Concentration: normal.   Speech: speech is normal   Level of consciousness: alert  Knowledge: good. Able to perform simple calculations.   Able to name object. Able to read. Able to repeat. Able to write. Normal comprehension.     Cranial Nerves     CN II   Visual fields full to confrontation.     CN III, IV, VI   Pupils are equal, round, and reactive to light.  Extraocular motions are normal.   Upgaze: normal  Downgaze: normal  Conjugate gaze: present  Vestibulo-ocular reflex: present    CN V   Facial sensation intact.     CN VII   Facial expression full, symmetric.     CN VIII   CN VIII normal.     CN IX, X   CN IX normal.   CN X normal.     CN XI   CN XI normal.     CN XII   CN XII normal.     Motor Exam   Muscle bulk: normal  Overall muscle tone: normal    Strength   Strength 5/5 throughout.   Right neck flexion: 5/5  Left neck flexion: 5/5  Right neck extension: 5/5  Left neck extension: 5/5  Right deltoid: 5/5  Left deltoid: 5/5  Right biceps: 5/5  Left biceps: 5/5  Right triceps: 5/5  Left triceps: 5/5  Right wrist flexion: 5/5  Left wrist flexion: 5/5  Right wrist extension: 5/5  Left wrist extension: 5/5  Right interossei: 5/5  Left interossei: 5/5  Right abdominals: 5/5  Left abdominals: 5/5  Right iliopsoas: 5/5  Left iliopsoas: 5/5  Right quadriceps: 5/5  Left quadriceps: 5/5  Right hamstrin/5  Left hamstrin/5  Right glutei: 5/5  Left glutei: 5/5  Right anterior tibial: 5/5  Left anterior tibial: 5/5  Right posterior tibial: 5/5  Left posterior  tibial: 5/5  Right peroneal: 5/5  Left peroneal: 5/5  Right gastroc: 5/5  Left gastroc: 5/5    Sensory Exam   Light touch normal.   Vibration normal.   Proprioception normal.   Pinprick normal.   Graphesthesia: normal  Stereognosis: normal    Gait, Coordination, and Reflexes     Gait  Gait: normal    Coordination   Romberg: negative  Finger to nose coordination: normal  Heel to shin coordination: normal  Tandem walking coordination: normal    Tremor   Resting tremor: absent  Intention tremor: absent  Action tremor: absent    Reflexes   Right brachioradialis: 2+  Left brachioradialis: 2+  Right biceps: 2+  Left biceps: 2+  Right triceps: 2+  Left triceps: 2+  Right patellar: 2+  Left patellar: 2+  Right achilles: 2+  Left achilles: 2+  Right : 2+  Left : 2+  Right plantar: normal  Left plantar: normal  Right García: absent  Left García: absent  Right ankle clonus: absent  Left ankle clonus: absent  Right pendular knee jerk: absent  Left pendular knee jerk: absent      Physical Exam  Vitals and nursing note reviewed.   Constitutional:       Appearance: Normal appearance. He is normal weight.   HENT:      Head: Normocephalic and atraumatic.      Right Ear: Tympanic membrane normal.      Left Ear: Tympanic membrane normal.      Nose: Nose normal.      Mouth/Throat:      Mouth: Mucous membranes are moist.      Pharynx: Oropharynx is clear.   Eyes:      Extraocular Movements: Extraocular movements intact and EOM normal.      Conjunctiva/sclera: Conjunctivae normal.      Pupils: Pupils are equal, round, and reactive to light.   Cardiovascular:      Rate and Rhythm: Normal rate and regular rhythm.      Pulses: Normal pulses.      Heart sounds: Normal heart sounds.   Pulmonary:      Effort: Pulmonary effort is normal.      Breath sounds: Normal breath sounds.   Abdominal:      General: Abdomen is flat. Bowel sounds are normal.      Palpations: Abdomen is soft.   Genitourinary:     Comments: Deferred.  "  Musculoskeletal:         General: Normal range of motion.      Cervical back: Normal range of motion and neck supple.   Skin:     General: Skin is warm and dry.      Capillary Refill: Capillary refill takes less than 2 seconds.   Neurological:      Mental Status: He is alert and oriented to person, place, and time. Mental status is at baseline.      Motor: Motor strength is normal.     Coordination: Finger-Nose-Finger Test, Heel to Shin Test and Romberg Test normal.      Gait: Gait is intact. Tandem walk normal.      Deep Tendon Reflexes:      Reflex Scores:       Tricep reflexes are 2+ on the right side and 2+ on the left side.       Bicep reflexes are 2+ on the right side and 2+ on the left side.       Brachioradialis reflexes are 2+ on the right side and 2+ on the left side.       Patellar reflexes are 2+ on the right side and 2+ on the left side.       Achilles reflexes are 2+ on the right side and 2+ on the left side.  Psychiatric:         Mood and Affect: Mood normal.         Speech: Speech normal.         Behavior: Behavior normal.         Thought Content: Thought content normal.         Judgment: Judgment normal.        Assessment:   23 Years old AA Male with PMHX as above came of the evaluation of "headaches".  - Chronic tension-type headache, intractable   - Chronic migraine without aura without status migrainosus, not intractable   - Nausea and vomiting, unspecified vomiting type.   - Bruxism.     Plan:   Patient Neurological Assessment is non-focal. No evidence / signs or symptoms of malignant HA's.     Patient reports excessive grinding of his teeth at night and non-compliance with wearing mouth guard for the past month.   Patient is educated that this can possibly be a trigger of tension type headaches.   He verbalizes understanding and reports he will wear mouth guard nightly.   He is also advised to follow with dentist for TMJ evaluation.     Start Inderal 60 mg po QHS for preventative migraine " therapy.    Start Imitrex 50 mg po q12h PRN Migraine onset for abortive migraine therapy.     Start Zofran-ODT 4mg po Q8H PRN Nausea and Vomiting.    Back to mouth guard.     Patient encouraged to increase protein in the diet to promote weight gain.     Patient instructed to take NSAIDs for intermittent neck pain.     MANAGEMENT     HEADACHE DIARY     DISCUSSED THE THREE-FOLD MANAGEMENT OF MIGRAINE:    LIFESTYLE CHANGES:     Good sleep hygiene  Avoid general triggers like lack of sleep/too much sleep, prolonged sun exposure,   excessive screen time and specific triggers based on you own diary   Minimize physical and emotional stress  Smoking avoidance and cessation  Limit caffeine drinks to 1-2 a day   Good hydration   Small frequent meals and avoid skipping meals   Moderate 30-minute-long aerobic exercises 3 times/week. Avoid strenuous exercise     ABORTIVE MEDICATIONS (ACUTE-RESCUE MEDICATIONS):     Should only be taken 2-3 times/week to avoid rebound and overuse headaches.    I-explained to the patient that pain meds especially triptans should NOT be taken daily to avoid Rebound Headache and Overuse Headache.    Take at the ONSET of the headache sumatriptan 100 mg PO  (or other Triptan) in combination with naproxen 500 mg PO or Ibuprofen 800 mg PO for headache without nausea or vomiting.  This regimen can be repeated only once in 24 hours after 2 hours.    Side effects of triptans were discussed and include rare cardiac and cerebral ischemia and cannot be used with migraine associate with focal neurological deficits (complicated migraine) in addition to drowsiness and potential impairment of driving ability. The patient verbalized understanding.    NSAIDs can cause peptic ulcers, renal insufficiency and may increase the risk of cardiovascular diseases.  SEs were discussed with the patient. The patient verbalized understanding.    Triptans have shown to be more effective than Gepatns with more SE/AE.    AVOID  NARCOTICS (OPIATES)    1. No randomized controlled study shows pain-free results with opioids in the treatment of migraine.     2. The physiologic consequences of opioid use are adverse, occur quickly, and can be permanent.   Decreased gray matter, release of calcitonin gene-related peptide, dynorphin, and pro-inflammatory peptides,   and activation of excitatory glutamate receptors are all associated with opioid exposure.     3. Opioids are pro-nociceptive, prevent reversal of migraine central sensitization, and interfere with triptan effectiveness.     4.Opioids precipitate bad clinical outcomes, especially transformation to daily headache.     5. They cause disease progression, comorbidity, and excessive health care consumption.      NEXT OPTIONS:    Triptans:  Rizatriptan (Maxalt).    Gepants: Nuretc (rimegepant)75 mg >Ubrelvy (ubrogepant) 100 mg    Ditans: Reyvow (lasmiditan) 100 mg (No driving due to sedation)    Fioricet without codeine with Reglan.      Prednisone with Reglan.    LAST RESORT:     DHE NS Trudhesa (Max 2 a week)     C/I: concomitant use of vasoconstrictors like Triptans, strong CY inhibitors such as HAART PIs (eg, ritonavir, nelfinavir, or indinavir)   and Macrolides (eg, erythromycin or clarithromycin), CAD, PVD, Stroke/TIA and Uncontrolled HTN.    Serious SEs include Vasospasm and Fibrosis (chronic use).     IMPENDING STATUS: Prednisone and Vistaril.    STATUS MIGRAINOSUS: ED-Infusion for Status Protocol.    PREVENTATIVE (MORE ACCURATELY MIGRAINE REDUCTION) MEDICATIONS:     Since the patient's headache is very frequent a lengthy discussion about preventative medications was carried out.The patient understands that prevention means DECREASING frequency and severity and NOT elimination.The patient was made aware that any new medication can cause serious allergic reaction.The medication is considered failure only if a therapeutic dose reached and maintained for 6-8 weeks.    HELPFUL  SUPPLEMENTS:     Helpful supplements include Co-Q 10, B2, Mg, Feverfew (Dolovent combination) and butterbur (Petadolex)    NEUROPHARMACOLOGY     NEXT OPTIONS:     Topiramate/Topamax (TPM) slow titration to 50 mg BID which can cause mental slowing, transient tingling,   kidney stones, weight loss, cleft lip and palate and rarely glaucoma and visual field defects . The patient was encouraged to drink a lot of fluids.     Zonisamide/Zonegran (ZNS) 100-400 mg QHS is a good alternative to TPM in case of SE/AE.     Amitriptyline/Elavil (TCA) slow titration to 100-Age which can cause sleepiness, dry eyes, dry mouth, urinary retention, and rarely cardiac arrhythmias    Propranolol/Inderal  (BB)slow titration to 80 mg BID which can cause low blood pressure, slow heart rate, erectile dysfunction,   depression, airway obstruction and heart failure exacerbations. Cannot be used with migraine associate with focal neurological deficits.    Lamotrigine/Lamictal  (LTG)slow titration to 100 mg BID which can cause serious skin rash and rare cardiac arrhythmias.   LTG is superior to other therapies for specifically reducing migraine aura.     ANTI-CGRP AGENTS: Qulipta (alogepant) 60 mg QD, Erenumab (Aimovig) 140 mg SQ Pen monthly (Reported cases of Constipation and BP elevation) ,   Galcanezumab (Emgality) 120 mg SQ Pen monthly after a loading dose of 240 mg  and Fremnezumab (Ajovy)   (Ligand Blocker): 225 mg SQ monthly or 675 mg every 3 months     Botox 200 units every 3 months .    LAST RESORT OPTIONS:      Namenda 10 mg BID     Valproic acid/ Depakote     NEUROMODULATION     Cefaly, Relivion, Nerivio and GammaCore (VNS)     SCHOOL AND WORK ACCOMMODATIONS    Allow the patient to wear sunglasses or a cap and switch out fluorescent bulbs.    Allow the patient to arrive 5 minutes later and leave 5 minutes earlier to avoid noisy traffic.    Allow the patient to carry a water bottle and refill as needed.    Allow the patient to snack  whenever is needed.    Allow the patient to decrease the computer brightness.    Allow the patient to take breaks as needed and extra time for assignments and deadlines.    Allow the patient to avoid strenuous activity as needed.       Labs:  CBC / CMP / Hep C / HIV / RPR / TSH  - done 04/ 2024  - non significant abnormalities.     Please do not hesitate to contact me with any updates, questions or concerns.    Reji Guthrie MD.  General Neurologist.

## 2024-04-22 DIAGNOSIS — L73.9 FOLLICULITIS: ICD-10-CM

## 2024-04-23 ENCOUNTER — OFFICE VISIT (OUTPATIENT)
Dept: SPORTS MEDICINE | Facility: CLINIC | Age: 24
End: 2024-04-23
Payer: COMMERCIAL

## 2024-04-23 VITALS
HEART RATE: 73 BPM | HEIGHT: 75 IN | WEIGHT: 187.38 LBS | DIASTOLIC BLOOD PRESSURE: 71 MMHG | SYSTOLIC BLOOD PRESSURE: 119 MMHG | BODY MASS INDEX: 23.3 KG/M2

## 2024-04-23 DIAGNOSIS — S83.232D COMPLEX TEAR OF MEDIAL MENISCUS OF LEFT KNEE AS CURRENT INJURY, SUBSEQUENT ENCOUNTER: Primary | ICD-10-CM

## 2024-04-23 PROCEDURE — 3074F SYST BP LT 130 MM HG: CPT | Mod: CPTII,S$GLB,, | Performed by: STUDENT IN AN ORGANIZED HEALTH CARE EDUCATION/TRAINING PROGRAM

## 2024-04-23 PROCEDURE — 1159F MED LIST DOCD IN RCRD: CPT | Mod: CPTII,S$GLB,, | Performed by: STUDENT IN AN ORGANIZED HEALTH CARE EDUCATION/TRAINING PROGRAM

## 2024-04-23 PROCEDURE — 1160F RVW MEDS BY RX/DR IN RCRD: CPT | Mod: CPTII,S$GLB,, | Performed by: STUDENT IN AN ORGANIZED HEALTH CARE EDUCATION/TRAINING PROGRAM

## 2024-04-23 PROCEDURE — 3078F DIAST BP <80 MM HG: CPT | Mod: CPTII,S$GLB,, | Performed by: STUDENT IN AN ORGANIZED HEALTH CARE EDUCATION/TRAINING PROGRAM

## 2024-04-23 PROCEDURE — 99999 PR PBB SHADOW E&M-EST. PATIENT-LVL III: CPT | Mod: PBBFAC,,, | Performed by: STUDENT IN AN ORGANIZED HEALTH CARE EDUCATION/TRAINING PROGRAM

## 2024-04-23 PROCEDURE — 3008F BODY MASS INDEX DOCD: CPT | Mod: CPTII,S$GLB,, | Performed by: STUDENT IN AN ORGANIZED HEALTH CARE EDUCATION/TRAINING PROGRAM

## 2024-04-23 PROCEDURE — 99213 OFFICE O/P EST LOW 20 MIN: CPT | Mod: S$GLB,,, | Performed by: STUDENT IN AN ORGANIZED HEALTH CARE EDUCATION/TRAINING PROGRAM

## 2024-04-23 RX ORDER — CLINDAMYCIN PHOSPHATE 11.9 MG/ML
SOLUTION TOPICAL DAILY
Qty: 60 ML | Refills: 2 | Status: SHIPPED | OUTPATIENT
Start: 2024-04-23

## 2024-04-23 NOTE — PROGRESS NOTES
Subjective:     Chief Complaint: Marco Vallecillo is a 23 y.o. male who had concerns including Pain of the Left Knee.    HPI    Patient is here s/p Left meniscus repair for almost 6 months post-op appointment. He reports that he is pain free.  He denies any nausea, vomiting, fever, chills, shortness of breath, or calf pain. He is attending formal physical therapy in [place], [place] 1x week as well as a directed home exercise program. He is running and jump training with no issues. He is pleased with his progress.  He has returned to basketball with no issues.      PROCEDURE PERFORMED by Yvan Mayorga MD on 11/01/23:   Left knee arthroscopic medial meniscus repair    Past Medical History:   Diagnosis Date    Anxiety disorder, unspecified     Depression     History of psychiatric hospitalization     Hx of psychiatric care     Mild intermittent asthma, uncomplicated     Psychiatric problem     Therapy        Current Outpatient Medications on File Prior to Visit   Medication Sig Dispense Refill    busPIRone (BUSPAR) 10 MG tablet Take 1 tablet (10 mg total) by mouth 2 (two) times daily. 60 tablet 5    clindamycin (CLEOCIN T) 1 % external solution Apply topically 2 (two) times daily. 60 mL 3    clindamycin (CLEOCIN T) 1 % external solution Apply topically once daily. Apply to the neck and trunk 60 mL 2    clobetasoL (TEMOVATE) 0.05 % external solution Apply topically once daily. Apply to the scalp. 50 mL 2    clobetasoL (TEMOVATE) 0.05 % external solution Apply topically once daily. 50 mL 3    doxycycline (VIBRA-TABS) 100 MG tablet Take 1 tablet (100 mg total) by mouth once daily. 90 tablet 0    doxycycline (VIBRA-TABS) 100 MG tablet Take 1 tablet (100 mg total) by mouth once daily. 30 tablet 0    tretinoin (RETIN-A) 0.025 % cream Apply topically every evening. 45 g 3    HYDROcodone-acetaminophen (NORCO) 5-325 mg per tablet Take 1 tablet by mouth 4 (four) times daily as needed. (Patient not taking:  Reported on 4/23/2024)       No current facility-administered medications on file prior to visit.       Past Surgical History:   Procedure Laterality Date    ARTHROSCOPY, SHOULDER, WITH CAPSULORRHAPHY Left 7/26/2022    Procedure: ARTHROSCOPY,SHOULDER,WITH CAPSULORRHAPHY - POLAR CARE;  Surgeon: Jaycee Mcguire MD;  Location: [facility] OR;  Service: Orthopedics;  Laterality: Left;  regional block    ARTHROSCOPY,KNEE,WITH MENISCUS REPAIR Left 11/1/2023    Procedure: ARTHROSCOPY,KNEE,WITH MENISCUS REPAIR;  Surgeon: Yvan Mayorga MD;  Location: [facility] OR;  Service: Orthopedics;  Laterality: Left;  Medial meniscus repair    REPAIR OF SUPERIOR LABRAL ANTERIOR-TO-POSTERIOR (SLAP) TEAR OF SHOULDER Left 7/26/2022    Procedure: REPAIR, SLAP LESION, SHOULDER;  Surgeon: Jaycee Mcguire MD;  Location: [facility] OR;  Service: Orthopedics;  Laterality: Left;  regional block    WISDOM TOOTH EXTRACTION         Family History   Problem Relation Name Age of Onset    Anxiety disorder Mother      Anxiety disorder Father      Pancreatic cancer Maternal Grandmother      Skin cancer Maternal Grandmother      Anxiety disorder Maternal Grandmother         Social History     Socioeconomic History    Marital status: Single    Number of children: 0   Tobacco Use    Smoking status: Never     Passive exposure: Never    Smokeless tobacco: Never   Substance and Sexual Activity    Alcohol use: Yes     Comment: rarely    Drug use: Yes     Types: Marijuana     Comment: smokes nightly    Sexual activity: Yes     Partners: Female   Social History Narrative    Lives with a roommate.  for [employer]. 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     Social Determinants of Health     Financial Resource Strain: Medium Risk (11/28/2023)    Overall Financial Resource Strain (CARDIA)     Difficulty of Paying Living Expenses: Somewhat hard   Food Insecurity: Food Insecurity Present (11/28/2023)    Hunger Vital Sign     Worried About Running Out of  Food in the Last Year: Often true     Ran Out of Food in the Last Year: Often true   Transportation Needs: No Transportation Needs (11/28/2023)    PRAPARE - Transportation     Lack of Transportation (Medical): No     Lack of Transportation (Non-Medical): No   Physical Activity: Sufficiently Active (11/28/2023)    Exercise Vital Sign     Days of Exercise per Week: 2 days     Minutes of Exercise per Session: 80 min   Stress: No Stress Concern Present (11/28/2023)    [employer] [place] [employer] - Occupational Stress Questionnaire     Feeling of Stress : Only a little   Social Connections: Unknown (11/28/2023)    Social Connection and Isolation Panel [NHANES]     Frequency of Communication with Friends and Family: More than three times a week     Frequency of Social Gatherings with Friends and Family: Twice a week     Active Member of Clubs or Organizations: No     Attends Club or Organization Meetings: Never     Marital Status: Never    Housing Stability: Low Risk  (11/28/2023)    Housing Stability Vital Sign     Unable to Pay for Housing in the Last Year: No     Number of Places Lived in the Last Year: 2     Unstable Housing in the Last Year: No        Review of Systems   Constitutional: Negative.   HENT: Negative.     Eyes: Negative.    Cardiovascular: Negative.    Respiratory: Negative.     Endocrine: Negative.    Hematologic/Lymphatic: Negative.    Skin: Negative.    Musculoskeletal:  Negative for falls, joint pain, joint swelling, muscle weakness and stiffness.   Neurological: Negative.    Psychiatric/Behavioral: Negative.     Allergic/Immunologic: Negative.        Pain Related Questions  Over the past 3 days, what was your average pain during activity? (I.e. running, jogging, walking, climbing stairs, getting dressed, ect.): 1  Over the past 3 days, what was your highest pain level?: 1  Over the past 3 days, what was your lowest pain level? : 0    Other  Was the patient's HEIGHT  measured or patient reported?: Patient Reported  Was the patient's WEIGHT measured or patient reported?: Measured    Objective:     General: Marco is well-developed, well-nourished, appears stated age, in no acute distress, alert and oriented to time, place and person.     General    Nursing note and vitals reviewed.  Constitutional: He is oriented to person, place, and time. He appears well-developed and well-nourished. No distress.   HENT:   Head: Normocephalic and atraumatic.   Nose: Nose normal.   Eyes: EOM are normal.   Cardiovascular:  Intact distal pulses.            Pulmonary/Chest: Effort normal. No respiratory distress.   Neurological: He is alert and oriented to person, place, and time.   Psychiatric: He has a normal mood and affect. His behavior is normal. Judgment and thought content normal.             Left Knee Exam     Inspection   Erythema: absent  Scars: present  Swelling: absent  Effusion: absent  Deformity: absent  Bruising: absent    Range of Motion   Extension:  -5   Flexion:  140     Other   Sensation: normal    Muscle Strength   Left Lower Extremity   Quadriceps:  5/5   Hamstrin/5     Vascular Exam       Left Pulses  Dorsalis Pedis:      2+  Posterior Tibial:      2+        Edema  Left Lower Leg: absent      Imaging:   X-rays of the left ring finger from 2024 personally viewed by me on that day these include PA, oblique, lateral.  There is a avulsion of the dorsal aspect of the proximal distal phalanx with palmar subluxation of the distal phalanx.    Assessment:   Marco Vallecillo is a 23 y.o. male status post above and doing well.    Encounter Diagnosis   Name Primary?    Complex tear of medial meniscus of left knee as current injury, subsequent encounter Yes              Plan:     He was doing well.  He can discontinue physical therapy.  Continue activities as tolerated.  Return to clinic on an as-needed basis.    All of the patient's questions were answered. Patient was  advised to call the clinic or contact me through the patient portal for any questions or concerns.         Patient questionnaires may have been collected.

## 2024-04-24 ENCOUNTER — OFFICE VISIT (OUTPATIENT)
Dept: NEUROLOGY | Facility: CLINIC | Age: 24
End: 2024-04-24
Payer: COMMERCIAL

## 2024-04-24 VITALS
SYSTOLIC BLOOD PRESSURE: 122 MMHG | BODY MASS INDEX: 23.82 KG/M2 | WEIGHT: 191.56 LBS | HEIGHT: 75 IN | DIASTOLIC BLOOD PRESSURE: 82 MMHG | HEART RATE: 66 BPM

## 2024-04-24 DIAGNOSIS — G44.221 CHRONIC TENSION-TYPE HEADACHE, INTRACTABLE: ICD-10-CM

## 2024-04-24 DIAGNOSIS — G43.709 CHRONIC MIGRAINE WITHOUT AURA WITHOUT STATUS MIGRAINOSUS, NOT INTRACTABLE: Primary | ICD-10-CM

## 2024-04-24 DIAGNOSIS — R11.2 NAUSEA AND VOMITING, UNSPECIFIED VOMITING TYPE: ICD-10-CM

## 2024-04-24 PROCEDURE — 99204 OFFICE O/P NEW MOD 45 MIN: CPT | Mod: S$GLB,,, | Performed by: PSYCHIATRY & NEUROLOGY

## 2024-04-24 PROCEDURE — 3008F BODY MASS INDEX DOCD: CPT | Mod: CPTII,S$GLB,, | Performed by: PSYCHIATRY & NEUROLOGY

## 2024-04-24 PROCEDURE — 3074F SYST BP LT 130 MM HG: CPT | Mod: CPTII,S$GLB,, | Performed by: PSYCHIATRY & NEUROLOGY

## 2024-04-24 PROCEDURE — 99999 PR PBB SHADOW E&M-EST. PATIENT-LVL IV: CPT | Mod: PBBFAC,,, | Performed by: PSYCHIATRY & NEUROLOGY

## 2024-04-24 PROCEDURE — 3079F DIAST BP 80-89 MM HG: CPT | Mod: CPTII,S$GLB,, | Performed by: PSYCHIATRY & NEUROLOGY

## 2024-04-24 PROCEDURE — 1160F RVW MEDS BY RX/DR IN RCRD: CPT | Mod: CPTII,S$GLB,, | Performed by: PSYCHIATRY & NEUROLOGY

## 2024-04-24 PROCEDURE — 1159F MED LIST DOCD IN RCRD: CPT | Mod: CPTII,S$GLB,, | Performed by: PSYCHIATRY & NEUROLOGY

## 2024-04-24 RX ORDER — SUMATRIPTAN SUCCINATE 100 MG/1
50 TABLET ORAL EVERY 12 HOURS
Qty: 9 TABLET | Refills: 0 | Status: SHIPPED | OUTPATIENT
Start: 2024-04-24 | End: 2024-05-03

## 2024-04-24 RX ORDER — ONDANSETRON 4 MG/1
4 TABLET, ORALLY DISINTEGRATING ORAL EVERY 8 HOURS PRN
Qty: 30 TABLET | Refills: 1 | Status: SHIPPED | OUTPATIENT
Start: 2024-04-24 | End: 2024-05-24

## 2024-04-24 RX ORDER — PROPRANOLOL HYDROCHLORIDE 60 MG/1
60 CAPSULE, EXTENDED RELEASE ORAL NIGHTLY
Qty: 30 CAPSULE | Refills: 0 | Status: SHIPPED | OUTPATIENT
Start: 2024-04-24 | End: 2024-05-24

## 2024-08-13 DIAGNOSIS — L21.9 SEBORRHEIC DERMATITIS: ICD-10-CM

## 2024-08-13 DIAGNOSIS — L73.1 PSEUDOFOLLICULITIS BARBAE: ICD-10-CM

## 2024-08-13 DIAGNOSIS — L73.9 FOLLICULITIS: ICD-10-CM

## 2024-08-14 RX ORDER — CLOBETASOL PROPIONATE 0.5 MG/ML
SOLUTION TOPICAL DAILY
Qty: 50 ML | Refills: 3 | Status: SHIPPED | OUTPATIENT
Start: 2024-08-14

## 2024-08-14 RX ORDER — CLINDAMYCIN PHOSPHATE 11.9 MG/ML
SOLUTION TOPICAL DAILY
Qty: 60 ML | Refills: 2 | Status: SHIPPED | OUTPATIENT
Start: 2024-08-14

## 2025-04-02 ENCOUNTER — E-VISIT (OUTPATIENT)
Dept: URGENT CARE | Facility: CLINIC | Age: 25
End: 2025-04-02
Payer: COMMERCIAL

## 2025-04-02 DIAGNOSIS — M54.9 BACK PAIN, UNSPECIFIED BACK LOCATION, UNSPECIFIED BACK PAIN LATERALITY, UNSPECIFIED CHRONICITY: Primary | ICD-10-CM

## 2025-04-02 RX ORDER — TIZANIDINE 2 MG/1
2 TABLET ORAL EVERY 8 HOURS PRN
Qty: 60 TABLET | Refills: 1 | Status: SHIPPED | OUTPATIENT
Start: 2025-04-02 | End: 2025-05-02

## 2025-04-02 RX ORDER — MELOXICAM 15 MG/1
15 TABLET ORAL DAILY PRN
Qty: 30 TABLET | Refills: 1 | Status: SHIPPED | OUTPATIENT
Start: 2025-04-02 | End: 2025-05-02

## 2025-04-02 RX ORDER — METHYLPREDNISOLONE 4 MG/1
TABLET ORAL
Qty: 21 TABLET | Refills: 0 | Status: SHIPPED | OUTPATIENT
Start: 2025-04-02

## 2025-04-02 NOTE — PROGRESS NOTES
Patient ID: Marco Vallecillo is a 24 y.o. male.    Chief Complaint: General Illness (Entered automatically based on patient selection in [employer].)          274}  The patient initiated a request through [employer] on 4/2/2025 for evaluation and management with a chief complaint of General Illness (Entered automatically based on patient selection in [employer].)     I evaluated the questionnaire submission on 04/02/2025 .    Total Time (in minutes): 13     Ohs Peq Evisit Supergroup-Muscle,Back,Joint    4/2/2025  9:17 AM CDT - Filed by Patient   What do you need help with? Back Problem   Do you agree to participate in an E-Visit? Yes   If you have any of the following symptoms, please present to your local emergency room or call 911: I acknowledge   What is the main issue you would like addressed today? Pain in lower back hip and butt going down part of thigh lasted for mutliple weeks intense pain when hinging   Where are you having pain? Lower Back   Does the pain extend into your legs? Yes, into my left leg   How bad is the pain? The pain is severe   Did you have an injury that caused the pain? Yes, the pain started after an injury   Please describe the circumstances of your injury. Pain started befor this but was agrevated doing cleans in the gym   How long has the pain been present? More than 4 weeks   Have you had back pain in the past? Yes, I have infrequently had pain similar to this before   Please list any medications or treatments you have used for back pain and indicate if it was effective or not. I went to the dr and got an xray was told to rest and recovered after a rest period   Do you have a fever? No   Do you have any of the following? Weakness   What makes the pain worse? Bending over;  Strenuous activity   What makes the pain better? None of the above   Have you ever been diagnosed with cancer? No   Have you ever been diagnosed with degenerative disc disease (arthritis of the spine)? No   Have you ever  "been diagnosed with osteoporosis or any other bone weakness? No   Have you ever had surgery on your back or spine? No   What is your usual health status? I am active and can move normally   Provide any additional information you feel is important.    Please attach any relevant images or files    Are you able to take your vital signs? No          Active Problem List with Overview Notes    Diagnosis Date Noted    Complex tear of medial meniscus of left knee as current injury 11/01/2023    Glenoid labrum tear, left, initial encounter 05/19/2022    Decreased range of motion of left shoulder 05/19/2022    Scapular dyskinesis 05/19/2022      Recent Labs Obtained:  Lab Results   Component Value Date    WBC 5.05 04/04/2024    HGB 13.4 (L) 04/04/2024    HCT 42.9 04/04/2024    MCV 89 04/04/2024     04/04/2024     04/04/2024    K 4.0 04/04/2024    GLU 88 04/04/2024    CREATININE 1.0 04/04/2024    EGFRNORACEVR >60.0 04/04/2024    TSH 0.671 06/28/2023      Review of patient's allergies indicates:   Allergen Reactions    Peanut      "Throat swells up"       Encounter Diagnosis   Name Primary?    Back pain, unspecified back location, unspecified back pain laterality, unspecified chronicity Yes        No orders of the defined types were placed in this encounter.     Medications Ordered This Encounter   Medications    meloxicam (MOBIC) 15 MG tablet     Sig: Take 1 tablet (15 mg total) by mouth daily as needed for Pain.     Dispense:  30 tablet     Refill:  1    methylPREDNISolone (MEDROL DOSEPACK) 4 mg tablet     Sig: follow package directions     Dispense:  21 tablet     Refill:  0    tiZANidine (ZANAFLEX) 2 MG tablet     Sig: Take 1 tablet (2 mg total) by mouth every 8 (eight) hours as needed (muscle spasms).     Dispense:  60 tablet     Refill:  1        E-Visit Time Tracking:    Day 1 Time (in minutes): 13    Total Time (in minutes): 13      274}          "

## 2025-06-24 ENCOUNTER — PATIENT MESSAGE (OUTPATIENT)
Dept: SPORTS MEDICINE | Facility: CLINIC | Age: 25
End: 2025-06-24
Payer: COMMERCIAL

## 2025-08-11 ENCOUNTER — CLINICAL SUPPORT (OUTPATIENT)
Facility: HOSPITAL | Age: 25
End: 2025-08-11
Payer: COMMERCIAL

## 2025-08-11 DIAGNOSIS — M53.86 DECREASED ROM OF LUMBAR SPINE: Primary | ICD-10-CM

## 2025-08-11 DIAGNOSIS — Z78.9 DECREASED ACTIVITIES OF DAILY LIVING (ADL): ICD-10-CM

## 2025-08-11 DIAGNOSIS — R29.898 WEAKNESS OF LEFT LOWER EXTREMITY: ICD-10-CM

## 2025-08-11 PROCEDURE — 97112 NEUROMUSCULAR REEDUCATION: CPT | Mod: PN

## 2025-08-11 PROCEDURE — 97161 PT EVAL LOW COMPLEX 20 MIN: CPT | Mod: PN

## 2025-08-12 PROBLEM — Z78.9 DECREASED ACTIVITIES OF DAILY LIVING (ADL): Status: ACTIVE | Noted: 2025-08-12

## 2025-08-12 PROBLEM — M53.86 DECREASED ROM OF LUMBAR SPINE: Status: ACTIVE | Noted: 2025-08-12

## 2025-08-12 PROBLEM — R29.898 WEAKNESS OF LEFT LOWER EXTREMITY: Status: ACTIVE | Noted: 2025-08-12

## 2025-08-14 ENCOUNTER — CLINICAL SUPPORT (OUTPATIENT)
Facility: HOSPITAL | Age: 25
End: 2025-08-14
Payer: COMMERCIAL

## 2025-08-14 DIAGNOSIS — M53.86 DECREASED ROM OF LUMBAR SPINE: Primary | ICD-10-CM

## 2025-08-14 DIAGNOSIS — Z78.9 DECREASED ACTIVITIES OF DAILY LIVING (ADL): ICD-10-CM

## 2025-08-14 DIAGNOSIS — R29.898 WEAKNESS OF LEFT LOWER EXTREMITY: ICD-10-CM

## 2025-08-14 PROCEDURE — 97110 THERAPEUTIC EXERCISES: CPT | Mod: PN

## 2025-08-14 PROCEDURE — 97112 NEUROMUSCULAR REEDUCATION: CPT | Mod: PN

## 2025-08-18 PROBLEM — Z78.9 DECREASED ACTIVITIES OF DAILY LIVING (ADL): Status: RESOLVED | Noted: 2025-08-12 | Resolved: 2025-08-18

## 2025-08-19 ENCOUNTER — OFFICE VISIT (OUTPATIENT)
Dept: DERMATOLOGY | Facility: CLINIC | Age: 25
End: 2025-08-19
Payer: COMMERCIAL

## 2025-08-19 ENCOUNTER — TELEPHONE (OUTPATIENT)
Dept: DERMATOLOGY | Facility: CLINIC | Age: 25
End: 2025-08-19

## 2025-08-19 DIAGNOSIS — L70.0 ACNE VULGARIS: Primary | ICD-10-CM

## 2025-08-19 PROCEDURE — G2211 COMPLEX E/M VISIT ADD ON: HCPCS | Mod: 95,,, | Performed by: STUDENT IN AN ORGANIZED HEALTH CARE EDUCATION/TRAINING PROGRAM

## 2025-08-19 PROCEDURE — 98006 SYNCH AUDIO-VIDEO EST MOD 30: CPT | Mod: 95,,, | Performed by: STUDENT IN AN ORGANIZED HEALTH CARE EDUCATION/TRAINING PROGRAM

## 2025-08-19 PROCEDURE — 1159F MED LIST DOCD IN RCRD: CPT | Mod: CPTII,95,, | Performed by: STUDENT IN AN ORGANIZED HEALTH CARE EDUCATION/TRAINING PROGRAM

## 2025-08-19 PROCEDURE — 1160F RVW MEDS BY RX/DR IN RCRD: CPT | Mod: CPTII,95,, | Performed by: STUDENT IN AN ORGANIZED HEALTH CARE EDUCATION/TRAINING PROGRAM

## 2025-08-20 ENCOUNTER — LAB VISIT (OUTPATIENT)
Dept: LAB | Facility: HOSPITAL | Age: 25
End: 2025-08-20
Attending: STUDENT IN AN ORGANIZED HEALTH CARE EDUCATION/TRAINING PROGRAM
Payer: COMMERCIAL

## 2025-08-20 ENCOUNTER — TELEPHONE (OUTPATIENT)
Dept: DERMATOLOGY | Facility: CLINIC | Age: 25
End: 2025-08-20
Payer: COMMERCIAL

## 2025-08-20 ENCOUNTER — CLINICAL SUPPORT (OUTPATIENT)
Facility: HOSPITAL | Age: 25
End: 2025-08-20
Payer: COMMERCIAL

## 2025-08-20 DIAGNOSIS — M53.86 DECREASED ROM OF LUMBAR SPINE: Primary | ICD-10-CM

## 2025-08-20 DIAGNOSIS — L70.0 ACNE VULGARIS: ICD-10-CM

## 2025-08-20 DIAGNOSIS — R29.898 WEAKNESS OF LEFT LOWER EXTREMITY: ICD-10-CM

## 2025-08-20 PROCEDURE — 36415 COLL VENOUS BLD VENIPUNCTURE: CPT

## 2025-08-20 PROCEDURE — 97110 THERAPEUTIC EXERCISES: CPT | Mod: PN

## 2025-08-20 PROCEDURE — 80061 LIPID PANEL: CPT

## 2025-08-20 PROCEDURE — 97112 NEUROMUSCULAR REEDUCATION: CPT | Mod: PN

## 2025-08-20 PROCEDURE — 80053 COMPREHEN METABOLIC PANEL: CPT

## 2025-08-21 ENCOUNTER — PATIENT MESSAGE (OUTPATIENT)
Dept: DERMATOLOGY | Facility: CLINIC | Age: 25
End: 2025-08-21
Payer: COMMERCIAL

## 2025-08-21 DIAGNOSIS — L70.0 ACNE VULGARIS: Primary | ICD-10-CM

## 2025-08-21 LAB
ALBUMIN SERPL BCP-MCNC: 4.6 G/DL (ref 3.5–5.2)
ALP SERPL-CCNC: 86 UNIT/L (ref 40–150)
ALT SERPL W/O P-5'-P-CCNC: 30 UNIT/L (ref 0–55)
ANION GAP (OHS): 8 MMOL/L (ref 8–16)
AST SERPL-CCNC: 30 UNIT/L (ref 0–50)
BILIRUB SERPL-MCNC: 1.8 MG/DL (ref 0.1–1)
BUN SERPL-MCNC: 8 MG/DL (ref 6–20)
CALCIUM SERPL-MCNC: 9.4 MG/DL (ref 8.7–10.5)
CHLORIDE SERPL-SCNC: 106 MMOL/L (ref 95–110)
CHOLEST SERPL-MCNC: 131 MG/DL (ref 120–199)
CHOLEST/HDLC SERPL: 2.6 {RATIO} (ref 2–5)
CO2 SERPL-SCNC: 26 MMOL/L (ref 23–29)
CREAT SERPL-MCNC: 1.1 MG/DL (ref 0.5–1.4)
GFR SERPLBLD CREATININE-BSD FMLA CKD-EPI: >60 ML/MIN/1.73/M2
GLUCOSE SERPL-MCNC: 87 MG/DL (ref 70–110)
HDLC SERPL-MCNC: 51 MG/DL (ref 40–75)
HDLC SERPL: 38.9 % (ref 20–50)
LDLC SERPL CALC-MCNC: 69.8 MG/DL (ref 63–159)
NONHDLC SERPL-MCNC: 80 MG/DL
POTASSIUM SERPL-SCNC: 3.9 MMOL/L (ref 3.5–5.1)
PROT SERPL-MCNC: 7.3 GM/DL (ref 6–8.4)
SODIUM SERPL-SCNC: 140 MMOL/L (ref 136–145)
TRIGL SERPL-MCNC: 51 MG/DL (ref 30–150)

## 2025-08-21 RX ORDER — ISOTRETINOIN 40 MG/1
40 CAPSULE ORAL DAILY
Qty: 30 CAPSULE | Refills: 0 | Status: SHIPPED | OUTPATIENT
Start: 2025-08-21 | End: 2025-08-21 | Stop reason: SDUPTHER

## 2025-08-21 RX ORDER — ISOTRETINOIN 40 MG/1
40 CAPSULE ORAL DAILY
Qty: 30 CAPSULE | Refills: 0 | Status: SHIPPED | OUTPATIENT
Start: 2025-08-21 | End: 2026-02-19

## 2025-08-26 ENCOUNTER — CLINICAL SUPPORT (OUTPATIENT)
Facility: HOSPITAL | Age: 25
End: 2025-08-26
Payer: COMMERCIAL

## 2025-08-26 DIAGNOSIS — M53.86 DECREASED ROM OF LUMBAR SPINE: Primary | ICD-10-CM

## 2025-08-26 DIAGNOSIS — R29.898 WEAKNESS OF LEFT LOWER EXTREMITY: ICD-10-CM

## 2025-08-26 PROCEDURE — 97110 THERAPEUTIC EXERCISES: CPT | Mod: PN

## 2025-08-26 PROCEDURE — 97530 THERAPEUTIC ACTIVITIES: CPT | Mod: PN

## 2025-08-26 PROCEDURE — 97112 NEUROMUSCULAR REEDUCATION: CPT | Mod: PN

## (undated) DEVICE — BRACE KNEE T SCOPE PREMIER

## (undated) DEVICE — COVER LIGHT HANDLE 80/CA

## (undated) DEVICE — BNDG COFLEX FOAM LF2 ST 6X5YD

## (undated) DEVICE — GLOVE PROTEXIS LIGHT BROWN 8.5

## (undated) DEVICE — UNDERGLOVES BIOGEL PI SIZE 8

## (undated) DEVICE — UNDERGLOVES BIOGEL PI SZ 7 LF

## (undated) DEVICE — PAD ABD 8X10 STERILE

## (undated) DEVICE — NDL 18GA X1 1/2 REG BEVEL

## (undated) DEVICE — SUT ETHILON 3-0 PS2 18 BLK

## (undated) DEVICE — CANNULA ARTHRO 8.25MM X 7CM

## (undated) DEVICE — PAD SHOULDER CARE POLAR

## (undated) DEVICE — Device

## (undated) DEVICE — GLOVE BIOGEL SKINSENSE PI 7.0

## (undated) DEVICE — BLADE GREAT WHITE 4.2 6/BX

## (undated) DEVICE — COVER CAMERA OPERATING ROOM

## (undated) DEVICE — GLOVE PROTEXIS LTX MICRO 8

## (undated) DEVICE — TUBE SET INFLOW/OUTFLOW

## (undated) DEVICE — TAPE SURG MEDIPORE 6X72IN

## (undated) DEVICE — TOURNIQUET SB QC DP 34X4IN

## (undated) DEVICE — SOL NACL IRR 3000ML

## (undated) DEVICE — MAT SURGICAL ECOSUCTIONER

## (undated) DEVICE — SYR 30CC LUER LOCK

## (undated) DEVICE — DRAPE STERI-DRAPE 1000 17X11IN

## (undated) DEVICE — SUT MONOCRYL 3-0 PS-2 UND

## (undated) DEVICE — SOL 9P NACL IRR PIC IL

## (undated) DEVICE — MARKER SKIN RULER STERILE

## (undated) DEVICE — GAUZE SPONGE 4X4 12PLY

## (undated) DEVICE — APPLICATOR CHLORAPREP ORN 26ML

## (undated) DEVICE — DRAPE INCISE IOBAN 2 23X17IN

## (undated) DEVICE — TOWEL OR DISP STRL BLUE 4/PK

## (undated) DEVICE — ADHESIVE DERMABOND ADVANCED

## (undated) DEVICE — GLOVE BIOGEL SKINSENSE PI 7.5

## (undated) DEVICE — SUT VICRYL CTD 2-0 GI 27 SH

## (undated) DEVICE — DRAPE T EXTRM SURG 121X128X90

## (undated) DEVICE — PAD ELECTRODE STER 1.5X3

## (undated) DEVICE — GOWN ECLIPSE REINF L4 XLNG XXL

## (undated) DEVICE — PAD ABDOMINAL STERILE 8X10IN

## (undated) DEVICE — ELECTRODE REM PLYHSV RETURN 9

## (undated) DEVICE — KIT TRACTION SHLDR ST DISP LF

## (undated) DEVICE — GOWN POLY REINF BRTH SLV XL

## (undated) DEVICE — CONNECTOR TUBING STR 5 IN 1

## (undated) DEVICE — HOOK SUTURE SPECTRUM II DISP

## (undated) DEVICE — SET EXTENSION STERILE 30IN

## (undated) DEVICE — CANNULA TRIPLEDAM 6MM X 7CM

## (undated) DEVICE — BRACE ARC 2.0 SHOULDER UNIV

## (undated) DEVICE — SYR IRRIGATION BULB STER 60ML

## (undated) DEVICE — DRAPE TOP 53X102IN

## (undated) DEVICE — BURR OVAL CUTTING 6 MM

## (undated) DEVICE — SUT 1 36IN PDS II VIO MONO

## (undated) DEVICE — DRESSING N ADH OIL EMUL 3X8

## (undated) DEVICE — SHAVER ULTRAFFR 4.2MM

## (undated) DEVICE — DRAPE THREE-QTR REINF 53X77IN

## (undated) DEVICE — TUBING SUC UNIV W/CONN 12FT

## (undated) DEVICE — DRAPE STERI U-SHAPED 47X51IN

## (undated) DEVICE — GEMINI ARTHREX SR8

## (undated) DEVICE — PAD DERMAPROX THCK 11X15X1IN

## (undated) DEVICE — ELECTRODE COOLPULSE 90 W/HAND

## (undated) DEVICE — SOL IRR NACL .9% 3000ML

## (undated) DEVICE — SYR 50CC LL

## (undated) DEVICE — DRAPE STERI INSTRUMENT 1018

## (undated) DEVICE — DRESSING AQUACEL AG SILVER 4X4

## (undated) DEVICE — KIT FIBERTAK CURVED SPEAR 1.8M

## (undated) DEVICE — GOWN POLY REINF X-LONG XL

## (undated) DEVICE — NDL SPINAL 18GX3.5 SPINOCAN

## (undated) DEVICE — SUT MCRYL PLUS 4-0 PS2 27IN

## (undated) DEVICE — BLADE SHAVER LANZA 4.2X13CM

## (undated) DEVICE — GOWN ECLIPSE REINF LV4 XLNG XL

## (undated) DEVICE — UNDERGLOVES BIOGEL PI SIZE 8.5